# Patient Record
Sex: FEMALE | NOT HISPANIC OR LATINO | Employment: FULL TIME | URBAN - METROPOLITAN AREA
[De-identification: names, ages, dates, MRNs, and addresses within clinical notes are randomized per-mention and may not be internally consistent; named-entity substitution may affect disease eponyms.]

---

## 2017-02-13 ENCOUNTER — HOSPITAL ENCOUNTER (EMERGENCY)
Facility: CLINIC | Age: 27
Discharge: HOME | End: 2017-02-13
Attending: EMERGENCY MEDICINE | Admitting: EMERGENCY MEDICINE
Payer: COMMERCIAL

## 2017-02-13 VITALS
TEMPERATURE: 98.2 F | DIASTOLIC BLOOD PRESSURE: 73 MMHG | HEART RATE: 82 BPM | OXYGEN SATURATION: 99 % | RESPIRATION RATE: 18 BRPM | SYSTOLIC BLOOD PRESSURE: 108 MMHG

## 2017-02-13 DIAGNOSIS — R10.13 EPIGASTRIC PAIN: ICD-10-CM

## 2017-02-13 DIAGNOSIS — R11.2 NON-INTRACTABLE VOMITING WITH NAUSEA, UNSPECIFIED VOMITING TYPE: ICD-10-CM

## 2017-02-13 LAB
ALBUMIN SERPL-MCNC: 3.6 G/DL (ref 3.4–5)
ALBUMIN UR-MCNC: NEGATIVE MG/DL
ALP SERPL-CCNC: 52 U/L (ref 40–150)
ALT SERPL W P-5'-P-CCNC: 26 U/L (ref 0–50)
ANION GAP SERPL CALCULATED.3IONS-SCNC: 10 MMOL/L (ref 3–14)
APPEARANCE UR: CLEAR
AST SERPL W P-5'-P-CCNC: 19 U/L (ref 0–45)
BASOPHILS # BLD AUTO: 0 10E9/L (ref 0–0.2)
BASOPHILS NFR BLD AUTO: 0.3 %
BILIRUB SERPL-MCNC: 0.4 MG/DL (ref 0.2–1.3)
BILIRUB UR QL STRIP: NEGATIVE
BUN SERPL-MCNC: 7 MG/DL (ref 7–30)
CALCIUM SERPL-MCNC: 8.5 MG/DL (ref 8.5–10.1)
CHLORIDE SERPL-SCNC: 107 MMOL/L (ref 94–109)
CO2 SERPL-SCNC: 25 MMOL/L (ref 20–32)
COLOR UR AUTO: ABNORMAL
CREAT SERPL-MCNC: 0.73 MG/DL (ref 0.52–1.04)
DIFFERENTIAL METHOD BLD: ABNORMAL
EOSINOPHIL # BLD AUTO: 0.1 10E9/L (ref 0–0.7)
EOSINOPHIL NFR BLD AUTO: 1.9 %
ERYTHROCYTE [DISTWIDTH] IN BLOOD BY AUTOMATED COUNT: 15.5 % (ref 10–15)
GFR SERPL CREATININE-BSD FRML MDRD: ABNORMAL ML/MIN/1.7M2
GLUCOSE SERPL-MCNC: 101 MG/DL (ref 70–99)
GLUCOSE UR STRIP-MCNC: NEGATIVE MG/DL
HCG UR QL: NEGATIVE
HCT VFR BLD AUTO: 39.8 % (ref 35–47)
HGB BLD-MCNC: 12.8 G/DL (ref 11.7–15.7)
HGB UR QL STRIP: NEGATIVE
IMM GRANULOCYTES # BLD: 0 10E9/L (ref 0–0.4)
IMM GRANULOCYTES NFR BLD: 0.2 %
KETONES UR STRIP-MCNC: NEGATIVE MG/DL
LEUKOCYTE ESTERASE UR QL STRIP: NEGATIVE
LIPASE SERPL-CCNC: 92 U/L (ref 73–393)
LYMPHOCYTES # BLD AUTO: 2.1 10E9/L (ref 0.8–5.3)
LYMPHOCYTES NFR BLD AUTO: 36.2 %
MCH RBC QN AUTO: 26.9 PG (ref 26.5–33)
MCHC RBC AUTO-ENTMCNC: 32.2 G/DL (ref 31.5–36.5)
MCV RBC AUTO: 84 FL (ref 78–100)
MONOCYTES # BLD AUTO: 0.4 10E9/L (ref 0–1.3)
MONOCYTES NFR BLD AUTO: 6.8 %
NEUTROPHILS # BLD AUTO: 3.2 10E9/L (ref 1.6–8.3)
NEUTROPHILS NFR BLD AUTO: 54.6 %
NITRATE UR QL: NEGATIVE
NRBC # BLD AUTO: 0 10*3/UL
NRBC BLD AUTO-RTO: 0 /100
PH UR STRIP: 7 PH (ref 5–7)
PLATELET # BLD AUTO: 296 10E9/L (ref 150–450)
POTASSIUM SERPL-SCNC: 3.3 MMOL/L (ref 3.4–5.3)
PROT SERPL-MCNC: 7.8 G/DL (ref 6.8–8.8)
RBC # BLD AUTO: 4.75 10E12/L (ref 3.8–5.2)
RBC #/AREA URNS AUTO: <1 /HPF (ref 0–2)
SODIUM SERPL-SCNC: 142 MMOL/L (ref 133–144)
SP GR UR STRIP: 1.01 (ref 1–1.03)
SQUAMOUS #/AREA URNS AUTO: 3 /HPF (ref 0–1)
URN SPEC COLLECT METH UR: ABNORMAL
UROBILINOGEN UR STRIP-MCNC: NORMAL MG/DL (ref 0–2)
WBC # BLD AUTO: 5.9 10E9/L (ref 4–11)
WBC #/AREA URNS AUTO: 1 /HPF (ref 0–2)

## 2017-02-13 PROCEDURE — 85025 COMPLETE CBC W/AUTO DIFF WBC: CPT | Performed by: EMERGENCY MEDICINE

## 2017-02-13 PROCEDURE — 25000132 ZZH RX MED GY IP 250 OP 250 PS 637: Performed by: EMERGENCY MEDICINE

## 2017-02-13 PROCEDURE — 25000128 H RX IP 250 OP 636: Performed by: EMERGENCY MEDICINE

## 2017-02-13 PROCEDURE — 96361 HYDRATE IV INFUSION ADD-ON: CPT

## 2017-02-13 PROCEDURE — 80053 COMPREHEN METABOLIC PANEL: CPT | Performed by: EMERGENCY MEDICINE

## 2017-02-13 PROCEDURE — 83690 ASSAY OF LIPASE: CPT | Performed by: EMERGENCY MEDICINE

## 2017-02-13 PROCEDURE — 81001 URINALYSIS AUTO W/SCOPE: CPT | Performed by: EMERGENCY MEDICINE

## 2017-02-13 PROCEDURE — 96374 THER/PROPH/DIAG INJ IV PUSH: CPT

## 2017-02-13 PROCEDURE — 81025 URINE PREGNANCY TEST: CPT | Performed by: EMERGENCY MEDICINE

## 2017-02-13 PROCEDURE — 99284 EMERGENCY DEPT VISIT MOD MDM: CPT | Mod: 25

## 2017-02-13 PROCEDURE — 25000125 ZZHC RX 250: Performed by: EMERGENCY MEDICINE

## 2017-02-13 RX ORDER — ONDANSETRON 2 MG/ML
4 INJECTION INTRAMUSCULAR; INTRAVENOUS ONCE
Status: COMPLETED | OUTPATIENT
Start: 2017-02-13 | End: 2017-02-13

## 2017-02-13 RX ORDER — ONDANSETRON 4 MG/1
4-8 TABLET, ORALLY DISINTEGRATING ORAL EVERY 8 HOURS PRN
Qty: 12 TABLET | Refills: 0 | Status: SHIPPED | OUTPATIENT
Start: 2017-02-13 | End: 2017-02-16

## 2017-02-13 RX ADMIN — ONDANSETRON 4 MG: 2 INJECTION INTRAMUSCULAR; INTRAVENOUS at 21:10

## 2017-02-13 RX ADMIN — LIDOCAINE HYDROCHLORIDE 30 ML: 20 SOLUTION ORAL; TOPICAL at 21:42

## 2017-02-13 RX ADMIN — SODIUM CHLORIDE 1000 ML: 9 INJECTION, SOLUTION INTRAVENOUS at 21:10

## 2017-02-13 ASSESSMENT — ENCOUNTER SYMPTOMS
ABDOMINAL PAIN: 1
DIARRHEA: 0
FEVER: 0
ROS GI COMMENTS: NEGATIVE FOR HEMATEMESIS.
NAUSEA: 1
VOMITING: 1

## 2017-02-13 NOTE — ED AVS SNAPSHOT
[facility] Emergency Department    [address]    [facility] [address]    Phone:  [phone]    Fax:  [phone]                                       Juana Madrigal   MRN: [id number]    Department:  [facility] Emergency Department   Date of Visit:  2/13/2017           After Visit Summary Signature Page     I have received my discharge instructions, and my questions have been answered. I have discussed any challenges I see with this plan with the nurse or doctor.    ..........................................................................................................................................  Patient/Patient Representative Signature      ..........................................................................................................................................  Patient Representative Print Name and Relationship to Patient    ..................................................               ................................................  Date                                            Time    ..........................................................................................................................................  Reviewed by Signature/Title    ...................................................              ..............................................  Date                                                            Time

## 2017-02-13 NOTE — ED AVS SNAPSHOT
[facility] Emergency Department    [address] [facility] [address]    Phone:  [phone]    Fax:  [phone]                                       Juana Madrigal   MRN: [id number]    Department:  [facility] Emergency Department   Date of Visit:  2/13/2017           Patient Information     Date Of Birth          1990        Your diagnoses for this visit were:     Non-intractable vomiting with nausea, unspecified vomiting type     Epigastric pain        You were seen by Archana Layton MD.      Follow-up Information     Follow up with [facility] Emergency Department.    Specialty:  EMERGENCY MEDICINE    Why:  If symptoms worsen    Contact information:    [address] [facility] [address] [phone]        Follow up with [facility].    Specialty:  Family Medicine    Why:  For follow-up    Contact information:    [address] [facility] [address] [phone]        Discharge Instructions         Discharge Instructions  Vomiting    You have been seen today for vomiting. This is usually caused by a virus, but some bacteria, parasites, medicines or other medical conditions can cause similar symptoms. At this time your doctor does not find that your vomiting is a sign of anything dangerous or life-threatening. However, sometimes the signs of serious illness do not show up right away. If you have new or worse symptoms, you may need to be seen again in the emergency department or by your primary doctor. Remember that serious problems like appendicitis can start as vomiting.     Return to the Emergency Department if:    You keep throwing up and you are not able to keep liquids down.     You feel you are getting dehydrated, such as being very thirsty, not urinating at least every 8-12 hours, or feeling faint or lightheaded.     You develop a new fever, or your fever continues for more  than 2 days.     You have belly pain that seems worse than cramps, is in one spot, or is getting worse over time.     You have blood in your vomit or stools.     You feel very weak.    You are not starting to improve within 24 hours of your visit here.     What can I do to help myself?    The most important thing to do is to drink clear liquids. If you have been vomiting a lot, it is best to have only small, frequent sips of liquids. Drinking too much at once may cause more vomiting. If you are vomiting often, you must replace minerals, sodium and potassium lost with your illness. Pedialyte  and sports drinks can help you replace these minerals. You can also drink clear liquids such as water, weak tea, apple juice, and 7-Up . Avoid acid liquids (orange), caffeine (coffee) or alcohol. Do not drink milk until you no longer have diarrhea.     After liquids are staying down, you may start eating mild foods. Soda crackers, toast, plain noodles, gelatin, applesauce and bananas are good first choices. Avoid foods that have acid, are spicy, fatty or have a lot of fiber (such as meats, coarse grains, vegetables). You may start eating these foods again in about 3 days when you are better.     Sometimes treatment includes prescription medicine to prevent nausea and vomiting. If your doctor prescribes these for you, take them as directed.     Don t take ibuprofen, or other nonsteroidal anti-inflammatory medicines without checking with your healthcare provider.   If you were given a prescription for medicine here today, be sure to read all of the information (including the package insert) that comes with your prescription.  This will include important information about the medicine, its side effects, and any warnings that you need to know about.  The pharmacist who fills the prescription can provide more information and answer questions you may have about the medicine.  If you have questions or concerns that the pharmacist  cannot address, please call or return to the Emergency Department.       Remember that you can always come back to the Emergency Department if you are not able to see your regular doctor in the amount of time listed above, if you get any new symptoms, or if there is anything that worries you.          24 Hour Appointment Hotline       To make an appointment at any [facility], call [phone] ([phone]). If you don't have a family doctor or clinic, we will help you find one. [facility] are conveniently located to serve the needs of you and your family.             Review of your medicines      START taking        Dose / Directions Last dose taken    omeprazole 20 MG CR capsule   Commonly known as:  priLOSEC   Dose:  20 mg   Quantity:  30 capsule        Take 1 capsule (20 mg) by mouth daily   Refills:  0        ondansetron 4 MG ODT tab   Commonly known as:  ZOFRAN ODT   Dose:  4-8 mg   Quantity:  12 tablet        Take 1-2 tablets (4-8 mg) by mouth every 8 hours as needed for nausea   Refills:  0                Prescriptions were sent or printed at these locations (2 Prescriptions)                   Other Prescriptions                Printed at Department/Unit printer (2 of 2)         ondansetron (ZOFRAN ODT) 4 MG ODT tab               omeprazole (PRILOSEC) 20 MG CR capsule                Procedures and tests performed during your visit     CBC with platelets differential    Comprehensive metabolic panel    HCG qualitative urine    Lipase    UA with Microscopic      Orders Needing Specimen Collection     None      Pending Results     No orders found from 2/11/2017 to 2/14/2017.            Pending Culture Results     No orders found from 2/11/2017 to 2/14/2017.             Test Results from your hospital stay     2/13/2017  9:09 PM - Interface, [place] Results      Component Results     Component Value Ref Range & Units Status    WBC 5.9 4.0 - 11.0 10e9/L Final    RBC Count 4.75 3.8 - 5.2 10e12/L  Final    Hemoglobin 12.8 11.7 - 15.7 g/dL Final    Hematocrit 39.8 35.0 - 47.0 % Final    MCV 84 78 - 100 fl Final    MCH 26.9 26.5 - 33.0 pg Final    MCHC 32.2 31.5 - 36.5 g/dL Final    RDW 15.5 (H) 10.0 - 15.0 % Final    Platelet Count 296 150 - 450 10e9/L Final    Diff Method Automated Method  Final    % Neutrophils 54.6 % Final    % Lymphocytes 36.2 % Final    % Monocytes 6.8 % Final    % Eosinophils 1.9 % Final    % Basophils 0.3 % Final    % Immature Granulocytes 0.2 % Final    Nucleated RBCs 0 0 /100 Final    Absolute Neutrophil 3.2 1.6 - 8.3 10e9/L Final    Absolute Lymphocytes 2.1 0.8 - 5.3 10e9/L Final    Absolute Monocytes 0.4 0.0 - 1.3 10e9/L Final    Absolute Eosinophils 0.1 0.0 - 0.7 10e9/L Final    Absolute Basophils 0.0 0.0 - 0.2 10e9/L Final    Abs Immature Granulocytes 0.0 0 - 0.4 10e9/L Final    Absolute Nucleated RBC 0.0  Final         2/13/2017  9:24 PM - Interface, [facility] Results      Component Results     Component Value Ref Range & Units Status    Sodium 142 133 - 144 mmol/L Final    Potassium 3.3 (L) 3.4 - 5.3 mmol/L Final    Chloride 107 94 - 109 mmol/L Final    Carbon Dioxide 25 20 - 32 mmol/L Final    Anion Gap 10 3 - 14 mmol/L Final    Glucose 101 (H) 70 - 99 mg/dL Final    Urea Nitrogen 7 7 - 30 mg/dL Final    Creatinine 0.73 0.52 - 1.04 mg/dL Final    GFR Estimate >90  Non  GFR Calc   >60 mL/min/1.7m2 Final    GFR Estimate If Black >90   GFR Calc   >60 mL/min/1.7m2 Final    Calcium 8.5 8.5 - 10.1 mg/dL Final    Bilirubin Total 0.4 0.2 - 1.3 mg/dL Final    Albumin 3.6 3.4 - 5.0 g/dL Final    Protein Total 7.8 6.8 - 8.8 g/dL Final    Alkaline Phosphatase 52 40 - 150 U/L Final    ALT 26 0 - 50 U/L Final    AST 19 0 - 45 U/L Final         2/13/2017  9:24 PM - Interface, [facility] Results      Component Results     Component Value Ref Range & Units Status    Lipase 92 73 - 393 U/L Final         2/13/2017  9:29 PM - Interface, [facility] Results      Component  Results     Component Value Ref Range & Units Status    Color Urine Light Yellow  Final    Appearance Urine Clear  Final    Glucose Urine Negative NEG mg/dL Final    Bilirubin Urine Negative NEG Final    Ketones Urine Negative NEG mg/dL Final    Specific Gravity Urine 1.007 1.003 - 1.035 Final    Blood Urine Negative NEG Final    pH Urine 7.0 5.0 - 7.0 pH Final    Protein Albumin Urine Negative NEG mg/dL Final    Urobilinogen mg/dL Normal 0.0 - 2.0 mg/dL Final    Nitrite Urine Negative NEG Final    Leukocyte Esterase Urine Negative NEG Final    Source Midstream Urine  Final    WBC Urine 1 0 - 2 /HPF Final    RBC Urine <1 0 - 2 /HPF Final    Squamous Epithelial /HPF Urine 3 (H) 0 - 1 /HPF Final         2/13/2017  9:27 PM - Interface, [facility] Results      Component Results     Component Value Ref Range & Units Status    HCG Qual Urine Negative NEG Final                Clinical Quality Measure: Blood Pressure Screening     Your blood pressure was checked while you were in the emergency department today. The last reading we obtained was  BP: 108/73 . Please read the guidelines below about what these numbers mean and what you should do about them.  If your systolic blood pressure (the top number) is less than 120 and your diastolic blood pressure (the bottom number) is less than 80, then your blood pressure is normal. There is nothing more that you need to do about it.  If your systolic blood pressure (the top number) is 120-139 or your diastolic blood pressure (the bottom number) is 80-89, your blood pressure may be higher than it should be. You should have your blood pressure rechecked within a year by a primary care provider.  If your systolic blood pressure (the top number) is 140 or greater or your diastolic blood pressure (the bottom number) is 90 or greater, you may have high blood pressure. High blood pressure is treatable, but if left untreated over time it can put you at risk for heart attack, stroke, or  "kidney failure. You should have your blood pressure rechecked by a primary care provider within the next 4 weeks.  If your provider in the emergency department today gave you specific instructions to follow-up with your doctor or provider even sooner than that, you should follow that instruction and not wait for up to 4 weeks for your follow-up visit.        Thank you for choosing [facility]       Thank you for choosing [facility] for your care. Our goal is always to provide you with excellent care. Hearing back from our patients is one way we can continue to improve our services. Please take a few minutes to complete the written survey that you may receive in the mail after you visit with us. Thank you!        [facility] Information     [email or web address] lets you send messages to your doctor, view your test results, renew your prescriptions, schedule appointments and more. To sign up, go to [email or web address] . Click on \"Log in\" on the left side of the screen, which will take you to the Welcome page. Then click on \"Sign up Now\" on the right side of the page.     You will be asked to enter the access code listed below, as well as some personal information. Please follow the directions to create your username and password.     Your access code is: [email or web address]  Expires: 2017 10:21 PM     Your access code will  in 90 days. If you need help or a new code, please call your [facility] clinic or [phone].        Care EveryWhere ID     This is your Care EveryWhere ID. This could be used by other organizations to access your [facility] medical records  [id number]        After Visit Summary       This is your record. Keep this with you and show to your community pharmacist(s) and doctor(s) at your next visit.                  "

## 2017-02-14 NOTE — ED NOTES
Patient presents to ER for abdominal pain on left lower side with nausea and vomited x 1. ABC's intact.

## 2017-02-14 NOTE — ED PROVIDER NOTES
History     Chief Complaint:  Abdominal Pain    HPI   Juana Madrigal is a 26 year old female who presents with moderate cramping lower abdominal pain, left greater than right. This pain began this morning along with fatigue, nausea, and non-bloody emesis. She has not had any fevers or diarrhea. The patient notes that she took Plan B last night and wonders if this could be the cause of her symptoms. She additionally reports that she has had worsening epigastric pain recently secondary to not taking her Omeprazole for the last 5 days due to running out. The patient has not taken any medications for her symptoms today. She has not been exposed to any sick contacts. No prior abdominal surgeries.     Allergies:  The patient has no known drug allergies.    Medications:    Omeprazole    Past Medical History:    Reflux    Past Surgical History:    History reviewed.  No significant past surgical history.     Family History:    History reviewed.  No significant family history.     Social History:  Relationship status: Single  The patient presents with her boyfriend.     Review of Systems   Constitutional: Negative for fever.   Gastrointestinal: Positive for abdominal pain, nausea and vomiting. Negative for diarrhea.        Negative for hematemesis.    All other systems reviewed and are negative.    Physical Exam   First Vitals:  BP: 108/73  Temp: 98.2  F (36.8  C)  Temp src: Oral  Pulse: 82  Resp: 18  SpO2: 99 % (02/13 2037-02/13 2234)   Physical Exam  Eyes:  Sclera white; Pupils are equal and round  ENT:    External ears and nares normal  CV:  Regular rate and rhythm, No murmur   Resp:  Breath sounds clear and equal bilaterally    Non-labored, no retractions or accessory muscle use  GI:  Abdomen is soft, non-tender, non-distended    No rebound tenderness or peritoneal features  MS:  Moves all extremities  Skin:  Warm and dry  Neuro: Speech is normal and fluent. No apparent deficit.        Emergency Department  Course   Laboratory:  CBC: WNL (WBC 5.9, HGB 12.8, )  CMP: Potassium 3.3 (L), Glucose 101 (H), o/w WNL (Creatinine 0.73)  Lipase: 92  HCG Qualitative urine: Negative  UA: Clear, light yellow urine: 3 Squamous Epithelial (A), o/w WNL    Interventions:  2110: Normal Saline, 1000 mL, IV  2110: Zofran, 4 mg, IV injection  2142: GI cocktail, 30 mL, PO     Emergency Department Course:  Nursing notes and vitals reviewed.  I performed an exam of the patient as documented above.  The above workup was undertaken.  2210: I rechecked the patient and discussed results.   Findings and plan explained to the Patient. Patient discharged home, status improved, with instructions regarding supportive care, medications, and reasons to return as well as the importance of close follow-up was reviewed.    Impression & Plan    Medical Decision Making:  Juana Madrigal is a 26 year old female here for evaluation of nausea and upper abdominal pain after stopping her omeprazole. Differential includes morning sickness, gastritis, peptic ulcer disease, hepatitis, pancreatitis, and biliary colic. She does not have any tenderness to suggest appendicitis or diverticulitis. After medications here, she was feeling much better. She is not pregnant. I considered whether nausea could be secondary to her use of Plan B which was discussed with her as well. Whether it is this or early gastroenteritis, they should both be self-limited conditions. Symptomatic medications are being prescribed and they can follow up in clinic or return if worsening.    Diagnosis:    ICD-10-CM   1. Non-intractable vomiting with nausea, unspecified vomiting type R11.2   2. Epigastric pain R10.13     Disposition:  Discharge to home with primary care follow up.     Discharge Medications:   Details   ondansetron (ZOFRAN ODT) 4 MG ODT tab Take 1-2 tablets (4-8 mg) by mouth every 8 hours as needed for nausea, Disp-12 tablet, R-0, Local Print      omeprazole  (PRILOSEC) 20 MG CR capsule Take 1 capsule (20 mg) by mouth daily, Disp-30 capsule, R-0, Local Print          I, Liliana Cueva, am serving as a scribe on 2/13/2017 at 8:19 PM to personally document services performed by Archana Layton MD, based on my observations and the provider's statements to me.    [facility] EMERGENCY DEPARTMENT     Archana Layton MD  02/14/17 9044

## 2017-02-14 NOTE — DISCHARGE INSTRUCTIONS
Discharge Instructions  Vomiting    You have been seen today for vomiting. This is usually caused by a virus, but some bacteria, parasites, medicines or other medical conditions can cause similar symptoms. At this time your doctor does not find that your vomiting is a sign of anything dangerous or life-threatening. However, sometimes the signs of serious illness do not show up right away. If you have new or worse symptoms, you may need to be seen again in the emergency department or by your primary doctor. Remember that serious problems like appendicitis can start as vomiting.     Return to the Emergency Department if:    You keep throwing up and you are not able to keep liquids down.     You feel you are getting dehydrated, such as being very thirsty, not urinating at least every 8-12 hours, or feeling faint or lightheaded.     You develop a new fever, or your fever continues for more than 2 days.     You have belly pain that seems worse than cramps, is in one spot, or is getting worse over time.     You have blood in your vomit or stools.     You feel very weak.    You are not starting to improve within 24 hours of your visit here.     What can I do to help myself?    The most important thing to do is to drink clear liquids. If you have been vomiting a lot, it is best to have only small, frequent sips of liquids. Drinking too much at once may cause more vomiting. If you are vomiting often, you must replace minerals, sodium and potassium lost with your illness. Pedialyte  and sports drinks can help you replace these minerals. You can also drink clear liquids such as water, weak tea, apple juice, and 7-Up . Avoid acid liquids (orange), caffeine (coffee) or alcohol. Do not drink milk until you no longer have diarrhea.     After liquids are staying down, you may start eating mild foods. Soda crackers, toast, plain noodles, gelatin, applesauce and bananas are good first choices. Avoid foods that have acid, are spicy,  fatty or have a lot of fiber (such as meats, coarse grains, vegetables). You may start eating these foods again in about 3 days when you are better.     Sometimes treatment includes prescription medicine to prevent nausea and vomiting. If your doctor prescribes these for you, take them as directed.     Don t take ibuprofen, or other nonsteroidal anti-inflammatory medicines without checking with your healthcare provider.   If you were given a prescription for medicine here today, be sure to read all of the information (including the package insert) that comes with your prescription.  This will include important information about the medicine, its side effects, and any warnings that you need to know about.  The pharmacist who fills the prescription can provide more information and answer questions you may have about the medicine.  If you have questions or concerns that the pharmacist cannot address, please call or return to the Emergency Department.       Remember that you can always come back to the Emergency Department if you are not able to see your regular doctor in the amount of time listed above, if you get any new symptoms, or if there is anything that worries you.

## 2017-02-23 ENCOUNTER — OFFICE VISIT (OUTPATIENT)
Dept: URGENT CARE | Facility: URGENT CARE | Age: 27
End: 2017-02-23
Payer: COMMERCIAL

## 2017-02-23 VITALS
SYSTOLIC BLOOD PRESSURE: 94 MMHG | WEIGHT: 205 LBS | DIASTOLIC BLOOD PRESSURE: 64 MMHG | HEART RATE: 76 BPM | TEMPERATURE: 97.7 F

## 2017-02-23 DIAGNOSIS — R30.0 DYSURIA: Primary | ICD-10-CM

## 2017-02-23 LAB
ALBUMIN UR-MCNC: NEGATIVE MG/DL
APPEARANCE UR: CLEAR
BACTERIA #/AREA URNS HPF: ABNORMAL /HPF
BETA HCG QUAL IFA URINE: NEGATIVE
BILIRUB UR QL STRIP: ABNORMAL
COLOR UR AUTO: YELLOW
GLUCOSE UR STRIP-MCNC: NEGATIVE MG/DL
HGB UR QL STRIP: ABNORMAL
KETONES UR STRIP-MCNC: NEGATIVE MG/DL
LEUKOCYTE ESTERASE UR QL STRIP: NEGATIVE
NITRATE UR QL: NEGATIVE
NON-SQ EPI CELLS #/AREA URNS LPF: ABNORMAL /LPF
PH UR STRIP: 5.5 PH (ref 5–7)
RBC #/AREA URNS AUTO: ABNORMAL /HPF (ref 0–2)
SP GR UR STRIP: >1.03 (ref 1–1.03)
URN SPEC COLLECT METH UR: ABNORMAL
UROBILINOGEN UR STRIP-ACNC: 0.2 EU/DL (ref 0.2–1)
WBC #/AREA URNS AUTO: ABNORMAL /HPF (ref 0–2)

## 2017-02-23 PROCEDURE — 99213 OFFICE O/P EST LOW 20 MIN: CPT | Performed by: NURSE PRACTITIONER

## 2017-02-23 PROCEDURE — 81001 URINALYSIS AUTO W/SCOPE: CPT | Performed by: NURSE PRACTITIONER

## 2017-02-23 PROCEDURE — 84703 CHORIONIC GONADOTROPIN ASSAY: CPT | Performed by: NURSE PRACTITIONER

## 2017-02-23 RX ORDER — NITROFURANTOIN 25; 75 MG/1; MG/1
100 CAPSULE ORAL 2 TIMES DAILY
Qty: 14 CAPSULE | Refills: 0 | Status: SHIPPED | OUTPATIENT
Start: 2017-02-23 | End: 2017-03-03

## 2017-02-23 RX ORDER — PHENAZOPYRIDINE HYDROCHLORIDE 100 MG/1
100 TABLET, FILM COATED ORAL 3 TIMES DAILY PRN
Qty: 6 TABLET | Refills: 0 | Status: SHIPPED | OUTPATIENT
Start: 2017-02-23 | End: 2017-03-03

## 2017-02-23 NOTE — MR AVS SNAPSHOT
After Visit Summary   2/23/2017    Juana Madrigal    MRN: [id number]           Patient Information     Date Of Birth          1990        Visit Information        Provider Department      2/23/2017 6:00 PM John Barros APRN CNP [facility]        Today's Diagnoses     Dysuria    -  1      Care Instructions      Dysuria with Uncertain Cause (Adult)    The urethra is the tube that allows urine to pass out of the body. In a woman, the urethra is the opening above the vagina. In men, the urethra is the opening on the tip of the penis. Dysuria is the feeling of pain or burning in the urethra when passing urine.  Dysuria can be caused by anything that irritates or inflames the urethra. This can be caused by an infection or chemical irritation. The most common cause of dysuria in adults is a bladder infection. This is diagnosed with a urine test. It requires treatment with an antibiotic.  Soaps, lotions, colognes, feminine hygiene products, and birth control jellies, creams, and foams can cause chemical irritation and dysuria. It will go away in 1 to 3 days after the last time you use these irritants.  Sexually transmitted disease (STD) from chlamydia or gonorrhea can cause dysuria. If your doctor suspects this, he or she may take a culture specimen. It will take about 3 days to get the results. Antibiotic treatment may be started before the culture test returns.  In women who have gone through menopause, dysuria can be a result of dryness in the lining of the urethra. This can be treated with hormones. Dysuria becomes long-term (chronic) when it lasts for weeks or months. You may need to see a specialist (urologist) to diagnose and treat chronic dysuria.  Home care  The following home care measures may help:    Avoid any chemicals or products that you suspect may be causing your symptoms.    If you were given a prescription medicine, take as directed and take  the entire amount.    If a culture was taken, do not have sex until you have been told that it is negative (no infection). Then follow your healthcare provider's advice to treat your condition.  If a culture was done and it is positive:    Both you and your sexual partner need to be treated, even if your partner has no symptoms.    Contact your healthcare provider or go to an urgent care clinic or the public health department to be examined and treated.    Do not have sex until both you and your partner have completed all antibiotic medicine and are told that you are no longer contagious.    Learn about  safe sex  practices and use these in the future. The safest sex is with a partner who has tested negative and only has sex with you. Condoms offer protection from spreading some STDs, including gonorrhea, chlamydia and HIV, but are not a guarantee.  Follow-up care  Follow up with your healthcare provider as advised by our staff. If a culture was taken, call as directed the result. If diagnosed with an STD, follow up with your provider or the public health department for complete STD screening, including HIV testing. For more information, contact the National STD Hotline at 841-937-8372.  When to seek medical advice  Call your healthcare provider right away if any of these occur:    No improvement after three days of treatment    Fever of 100.4 F (38 C) or higher, or as directed    Increasing back or abdominal pain    Inability to urinate because of pain    A new discharge from the urethra, vagina or penis    Painful sores on the penis    Rash or joint pain    Enlarged painful lymph nodes (lumps) in the groin    Testicle pain or swelling of the scrotum    4975-8805 [employer]. [address] [place] [address], [place] [address]. All rights reserved. This information is not intended as a substitute for professional medical care. Always follow your healthcare professional's instructions.              Follow-ups  "after your visit        Who to contact     If you have questions or need follow up information about today's clinic visit or your schedule please contact [facility] directly at [phone].  Normal or non-critical lab and imaging results will be communicated to you by MyChart, letter or phone within 4 business days after the clinic has received the results. If you do not hear from us within 7 days, please contact the clinic through MyChart or phone. If you have a critical or abnormal lab result, we will notify you by phone as soon as possible.  Submit refill requests through [employer] or call your pharmacy and they will forward the refill request to us. Please allow 3 business days for your refill to be completed.          Additional Information About Your Visit        [employer] Information     [employer] lets you send messages to your doctor, view your test results, renew your prescriptions, schedule appointments and more. To sign up, go to [email or web address] . Click on \"Log in\" on the left side of the screen, which will take you to the Welcome page. Then click on \"Sign up Now\" on the right side of the page.     You will be asked to enter the access code listed below, as well as some personal information. Please follow the directions to create your username and password.     Your access code is: [email or web address]  Expires: 2017 10:21 PM     Your access code will  in 90 days. If you need help or a new code, please call your [place] clinic or [phone].        Care EveryWhere ID     This is your Care EveryWhere ID. This could be used by other organizations to access your [place] medical records  [id number]        Your Vitals Were     Pulse Temperature Last Period             76 97.7  F (36.5  C) (Oral) 01/15/2017          Blood Pressure from Last 3 Encounters:   17 94/64   17 108/73    Weight from Last 3 Encounters:   17 205 lb (93 kg)              We " Performed the Following     Beta HCG qual IFA urine     UA with Microscopic reflex to Culture          Today's Medication Changes          These changes are accurate as of: 2/23/17  8:57 PM.  If you have any questions, ask your nurse or doctor.               Start taking these medicines.        Dose/Directions    nitrofurantoin (macrocrystal-monohydrate) 100 MG capsule   Commonly known as:  MACROBID   Used for:  Dysuria   Started by:  John Barros APRN CNP        Dose:  100 mg   Take 1 capsule (100 mg) by mouth 2 times daily for 7 days   Quantity:  14 capsule   Refills:  0       phenazopyridine 100 MG tablet   Commonly known as:  PYRIDIUM   Used for:  Dysuria   Started by:  John Barros APRN CNP        Dose:  100 mg   Take 1 tablet (100 mg) by mouth 3 times daily as needed for urinary tract discomfort   Quantity:  6 tablet   Refills:  0            Where to get your medicines      These medications were sent to [facility] [address] [place] [address] [address] [facility]  [address] [facility] [address]    Hours:  24-hours Phone:  [phone]     nitrofurantoin (macrocrystal-monohydrate) 100 MG capsule    phenazopyridine 100 MG tablet                Primary Care Provider    Fvl None       No address on file        Thank you!     Thank you for choosing [facility]  for your care. Our goal is always to provide you with excellent care. Hearing back from our patients is one way we can continue to improve our services. Please take a few minutes to complete the written survey that you may receive in the mail after your visit with us. Thank you!             Your Updated Medication List - Protect others around you: Learn how to safely use, store and throw away your medicines at www.disposemymeds.org.          This list is accurate as of: 2/23/17  8:57 PM.  Always use your most recent med list.                   Brand Name Dispense Instructions  for use    nitrofurantoin (macrocrystal-monohydrate) 100 MG capsule    MACROBID    14 capsule    Take 1 capsule (100 mg) by mouth 2 times daily for 7 days       omeprazole 20 MG CR capsule    priLOSEC    30 capsule    Take 1 capsule (20 mg) by mouth daily       phenazopyridine 100 MG tablet    PYRIDIUM    6 tablet    Take 1 tablet (100 mg) by mouth 3 times daily as needed for urinary tract discomfort

## 2017-02-24 NOTE — PATIENT INSTRUCTIONS
Dysuria with Uncertain Cause (Adult)    The urethra is the tube that allows urine to pass out of the body. In a woman, the urethra is the opening above the vagina. In men, the urethra is the opening on the tip of the penis. Dysuria is the feeling of pain or burning in the urethra when passing urine.  Dysuria can be caused by anything that irritates or inflames the urethra. This can be caused by an infection or chemical irritation. The most common cause of dysuria in adults is a bladder infection. This is diagnosed with a urine test. It requires treatment with an antibiotic.  Soaps, lotions, colognes, feminine hygiene products, and birth control jellies, creams, and foams can cause chemical irritation and dysuria. It will go away in 1 to 3 days after the last time you use these irritants.  Sexually transmitted disease (STD) from chlamydia or gonorrhea can cause dysuria. If your doctor suspects this, he or she may take a culture specimen. It will take about 3 days to get the results. Antibiotic treatment may be started before the culture test returns.  In women who have gone through menopause, dysuria can be a result of dryness in the lining of the urethra. This can be treated with hormones. Dysuria becomes long-term (chronic) when it lasts for weeks or months. You may need to see a specialist (urologist) to diagnose and treat chronic dysuria.  Home care  The following home care measures may help:    Avoid any chemicals or products that you suspect may be causing your symptoms.    If you were given a prescription medicine, take as directed and take the entire amount.    If a culture was taken, do not have sex until you have been told that it is negative (no infection). Then follow your healthcare provider's advice to treat your condition.  If a culture was done and it is positive:    Both you and your sexual partner need to be treated, even if your partner has no symptoms.    Contact your healthcare provider or go  to an urgent care clinic or the public health department to be examined and treated.    Do not have sex until both you and your partner have completed all antibiotic medicine and are told that you are no longer contagious.    Learn about  safe sex  practices and use these in the future. The safest sex is with a partner who has tested negative and only has sex with you. Condoms offer protection from spreading some STDs, including gonorrhea, chlamydia and HIV, but are not a guarantee.  Follow-up care  Follow up with your healthcare provider as advised by our staff. If a culture was taken, call as directed the result. If diagnosed with an STD, follow up with your provider or the public health department for complete STD screening, including HIV testing. For more information, contact the National STD Hotline at 139-994-3960.  When to seek medical advice  Call your healthcare provider right away if any of these occur:    No improvement after three days of treatment    Fever of 100.4 F (38 C) or higher, or as directed    Increasing back or abdominal pain    Inability to urinate because of pain    A new discharge from the urethra, vagina or penis    Painful sores on the penis    Rash or joint pain    Enlarged painful lymph nodes (lumps) in the groin    Testicle pain or swelling of the scrotum    2120-0827 [employer]. [address] [place] [address], [place] [address]. All rights reserved. This information is not intended as a substitute for professional medical care. Always follow your healthcare professional's instructions.

## 2017-02-24 NOTE — NURSING NOTE
Chief Complaint   Patient presents with     Generalized Body Aches     back pain and bodyache for almost one week.      Amenorrhea     missed menses - request pregnancy test.        Initial BP 94/64 (BP Location: Left arm, Patient Position: Chair, Cuff Size: Adult Regular)  Pulse 76  Temp 97.7  F (36.5  C) (Oral)  Wt 205 lb (93 kg)  LMP 01/15/2017 There is no height or weight on file to calculate BMI.  Medication Reconciliation: complete

## 2017-02-24 NOTE — PROGRESS NOTES
SUBJECTIVE: Juana Madrigal is a 26 year old female here with her  for eval of urinary frequency, urgency and dysuria x 2 days, without flank pain, fever, chills, or abnormal vaginal discharge or bleeding. Also had a cough that started today.     OBJECTIVE: Appears well, in no apparent distress.  Vital signs are normal.BP 94/64 (BP Location: Left arm, Patient Position: Chair, Cuff Size: Adult Regular)  Pulse 76  Temp 97.7  F (36.5  C) (Oral)  Wt 205 lb (93 kg)  LMP 01/15/2017 Heart RRR no murmurs Lungs are clear  The abdomen is soft without tenderness, guarding, mass, rebound or organomegaly. No CVA tenderness or inguinal adenopathy noted.       Color Urine Yellow    Final 02/23/2017  7:01 PM 65   Appearance Urine Clear    Final 02/23/2017  7:01 PM 65   Glucose Urine Negative  NEG mg/dL Final 02/23/2017  7:01 PM 65   Bilirubin Urine  (A) NEG  Final 02/23/2017  7:01 PM 65   Small   This is an unconfirmed screening test result. A positive result may be false.      Ketones Urine Negative  NEG mg/dL Final 02/23/2017  7:01 PM 65   Specific Gravity Urine >1.030  1.003 - 1.035  Final 02/23/2017  7:01 PM 65   pH Urine 5.5  5.0 - 7.0 pH Final 02/23/2017  7:01 PM 65   Protein Albumin Urine Negative  NEG mg/dL Final 02/23/2017  7:01 PM 65   Urobilinogen Urine 0.2  0.2 - 1.0 EU/dL Final 02/23/2017  7:01 PM 65   Nitrite Urine Negative  NEG  Final 02/23/2017  7:01 PM 65   Blood Urine Moderate (A) NEG  Final 02/23/2017  7:01 PM 65   Leukocyte Esterase Urine Negative  NEG  Final 02/23/2017  7:01 PM 65   Source Midstream Urine    Final 02/23/2017  7:01 PM 65   WBC Urine O - 2  0 - 2 /HPF Final 02/23/2017  7:01 PM 65   RBC Urine 2-5 (A) 0 - 2 /HPF Final 02/23/2017  7:01 PM 65   Squamous Epithelial /LPF Urine Few  FEW /LPF Final 02/23/2017  7:01 PM 65   Bacteria Urine Few (A) NEG /HPF Final 02/23/2017       Pregnancy test is negative     ASSESSMENT: Cough and evolving UTI       PLAN: Rx Pyridium Macrobid fluids.   UA  discussed with patient VSS afebrile benign abdominal exam lungs are clear.  Return  if these symptoms worsen or fail to improve as anticipated.    FYI:  became loud screaming demanding to be seen because he had been waiting too long. Asked nursing staff to advised patient if they needed to leave to go home and we would call with results.    Nursing staff advised provider patient very angry about the wait. Asked available nursing to assist with this visit.  Patient continued with same verbal accusations of not being given the proper care and that  nursing told him to leave the clinic. Nursing staff and this provider used very non confrontational communication and tried to redirect but patient continued with behavior. Appeared angry and very intimidating to the staff.     He was not happy about seeing a NP asked if he was going to see a doctor. He continued with belligerent loud speech thru out the exam despite this provider asking that he lower his voice. Nursing has given patient a number to call for his complaints.

## 2017-03-03 ENCOUNTER — HOSPITAL ENCOUNTER (EMERGENCY)
Facility: CLINIC | Age: 27
Discharge: HOME | End: 2017-03-03
Attending: NURSE PRACTITIONER | Admitting: NURSE PRACTITIONER
Payer: COMMERCIAL

## 2017-03-03 VITALS
OXYGEN SATURATION: 99 % | TEMPERATURE: 97.5 F | RESPIRATION RATE: 12 BRPM | BODY MASS INDEX: 26.41 KG/M2 | HEIGHT: 72 IN | SYSTOLIC BLOOD PRESSURE: 102 MMHG | HEART RATE: 79 BPM | WEIGHT: 195 LBS | DIASTOLIC BLOOD PRESSURE: 70 MMHG

## 2017-03-03 DIAGNOSIS — R10.13 EPIGASTRIC PAIN: ICD-10-CM

## 2017-03-03 LAB
ALBUMIN SERPL-MCNC: 3.6 G/DL (ref 3.4–5)
ALBUMIN UR-MCNC: NEGATIVE MG/DL
ALP SERPL-CCNC: 60 U/L (ref 40–150)
ALT SERPL W P-5'-P-CCNC: 31 U/L (ref 0–50)
ANION GAP SERPL CALCULATED.3IONS-SCNC: 9 MMOL/L (ref 3–14)
APPEARANCE UR: CLEAR
AST SERPL W P-5'-P-CCNC: 25 U/L (ref 0–45)
BASOPHILS # BLD AUTO: 0 10E9/L (ref 0–0.2)
BASOPHILS NFR BLD AUTO: 0.2 %
BILIRUB SERPL-MCNC: 0.5 MG/DL (ref 0.2–1.3)
BILIRUB UR QL STRIP: NEGATIVE
BUN SERPL-MCNC: 8 MG/DL (ref 7–30)
CALCIUM SERPL-MCNC: 8.4 MG/DL (ref 8.5–10.1)
CHLORIDE SERPL-SCNC: 105 MMOL/L (ref 94–109)
CO2 SERPL-SCNC: 27 MMOL/L (ref 20–32)
COLOR UR AUTO: YELLOW
CREAT SERPL-MCNC: 0.75 MG/DL (ref 0.52–1.04)
DIFFERENTIAL METHOD BLD: NORMAL
EOSINOPHIL # BLD AUTO: 0 10E9/L (ref 0–0.7)
EOSINOPHIL NFR BLD AUTO: 0.4 %
ERYTHROCYTE [DISTWIDTH] IN BLOOD BY AUTOMATED COUNT: 14.8 % (ref 10–15)
GFR SERPL CREATININE-BSD FRML MDRD: ABNORMAL ML/MIN/1.7M2
GLUCOSE SERPL-MCNC: 87 MG/DL (ref 70–99)
GLUCOSE UR STRIP-MCNC: NEGATIVE MG/DL
HCG UR QL: NEGATIVE
HCT VFR BLD AUTO: 37.1 % (ref 35–47)
HGB BLD-MCNC: 11.8 G/DL (ref 11.7–15.7)
HGB UR QL STRIP: NEGATIVE
IMM GRANULOCYTES # BLD: 0 10E9/L (ref 0–0.4)
IMM GRANULOCYTES NFR BLD: 0.2 %
KETONES UR STRIP-MCNC: NEGATIVE MG/DL
LEUKOCYTE ESTERASE UR QL STRIP: NEGATIVE
LIPASE SERPL-CCNC: 74 U/L (ref 73–393)
LYMPHOCYTES # BLD AUTO: 1.9 10E9/L (ref 0.8–5.3)
LYMPHOCYTES NFR BLD AUTO: 21.2 %
MCH RBC QN AUTO: 26.5 PG (ref 26.5–33)
MCHC RBC AUTO-ENTMCNC: 31.8 G/DL (ref 31.5–36.5)
MCV RBC AUTO: 83 FL (ref 78–100)
MONOCYTES # BLD AUTO: 0.5 10E9/L (ref 0–1.3)
MONOCYTES NFR BLD AUTO: 5.9 %
NEUTROPHILS # BLD AUTO: 6.4 10E9/L (ref 1.6–8.3)
NEUTROPHILS NFR BLD AUTO: 72.1 %
NITRATE UR QL: NEGATIVE
NRBC # BLD AUTO: 0 10*3/UL
NRBC BLD AUTO-RTO: 0 /100
PH UR STRIP: 7 PH (ref 5–7)
PLATELET # BLD AUTO: 289 10E9/L (ref 150–450)
POTASSIUM SERPL-SCNC: 3.5 MMOL/L (ref 3.4–5.3)
PROT SERPL-MCNC: 7.3 G/DL (ref 6.8–8.8)
RBC # BLD AUTO: 4.46 10E12/L (ref 3.8–5.2)
RBC #/AREA URNS AUTO: <1 /HPF (ref 0–2)
SODIUM SERPL-SCNC: 141 MMOL/L (ref 133–144)
SP GR UR STRIP: 1.01 (ref 1–1.03)
URN SPEC COLLECT METH UR: NORMAL
UROBILINOGEN UR STRIP-MCNC: 0 MG/DL (ref 0–2)
WBC # BLD AUTO: 9 10E9/L (ref 4–11)
WBC #/AREA URNS AUTO: 1 /HPF (ref 0–2)

## 2017-03-03 PROCEDURE — 96374 THER/PROPH/DIAG INJ IV PUSH: CPT

## 2017-03-03 PROCEDURE — 25000128 H RX IP 250 OP 636: Performed by: NURSE PRACTITIONER

## 2017-03-03 PROCEDURE — 81001 URINALYSIS AUTO W/SCOPE: CPT | Performed by: NURSE PRACTITIONER

## 2017-03-03 PROCEDURE — 85025 COMPLETE CBC W/AUTO DIFF WBC: CPT | Performed by: NURSE PRACTITIONER

## 2017-03-03 PROCEDURE — 99284 EMERGENCY DEPT VISIT MOD MDM: CPT | Mod: 25

## 2017-03-03 PROCEDURE — 96361 HYDRATE IV INFUSION ADD-ON: CPT

## 2017-03-03 PROCEDURE — 25000125 ZZHC RX 250: Performed by: NURSE PRACTITIONER

## 2017-03-03 PROCEDURE — 25000132 ZZH RX MED GY IP 250 OP 250 PS 637: Performed by: NURSE PRACTITIONER

## 2017-03-03 PROCEDURE — 80053 COMPREHEN METABOLIC PANEL: CPT | Performed by: NURSE PRACTITIONER

## 2017-03-03 PROCEDURE — 81025 URINE PREGNANCY TEST: CPT | Performed by: NURSE PRACTITIONER

## 2017-03-03 PROCEDURE — 83690 ASSAY OF LIPASE: CPT | Performed by: NURSE PRACTITIONER

## 2017-03-03 RX ORDER — SUCRALFATE ORAL 1 G/10ML
1 SUSPENSION ORAL 4 TIMES DAILY
Qty: 200 ML | Refills: 0 | Status: SHIPPED | OUTPATIENT
Start: 2017-03-03 | End: 2017-03-08

## 2017-03-03 RX ORDER — METOCLOPRAMIDE HYDROCHLORIDE 5 MG/ML
10 INJECTION INTRAMUSCULAR; INTRAVENOUS ONCE
Status: COMPLETED | OUTPATIENT
Start: 2017-03-03 | End: 2017-03-03

## 2017-03-03 RX ADMIN — SODIUM CHLORIDE 1000 ML: 9 INJECTION, SOLUTION INTRAVENOUS at 18:20

## 2017-03-03 RX ADMIN — METOCLOPRAMIDE 10 MG: 5 INJECTION, SOLUTION INTRAMUSCULAR; INTRAVENOUS at 18:20

## 2017-03-03 RX ADMIN — LIDOCAINE HYDROCHLORIDE 30 ML: 20 SOLUTION ORAL; TOPICAL at 18:52

## 2017-03-03 ASSESSMENT — ENCOUNTER SYMPTOMS
VOMITING: 1
DYSURIA: 0
DIARRHEA: 0
ABDOMINAL PAIN: 1

## 2017-03-03 NOTE — ED AVS SNAPSHOT
[facility] Emergency Department    [address]    [facility] [address]    Phone:  [phone]    Fax:  [phone]                                       Juana Madrigal   MRN: [id number]    Department:  [facility] Emergency Department   Date of Visit:  3/3/2017           After Visit Summary Signature Page     I have received my discharge instructions, and my questions have been answered. I have discussed any challenges I see with this plan with the nurse or doctor.    ..........................................................................................................................................  Patient/Patient Representative Signature      ..........................................................................................................................................  Patient Representative Print Name and Relationship to Patient    ..................................................               ................................................  Date                                            Time    ..........................................................................................................................................  Reviewed by Signature/Title    ...................................................              ..............................................  Date                                                            Time

## 2017-03-03 NOTE — ED AVS SNAPSHOT
[facility]    [address]    [facility] [address]    Phone:  [phone]    Fax:  [phone]                                       Juana Madrigal   MRN: [id number]    Department:  [facility]   Date of Visit:  3/3/2017           Patient Information     Date Of Birth          1990        Your diagnoses for this visit were:     Epigastric pain        You were seen by Davin Waldron APRN CNP.      Follow-up Information     Call [facility].    Why:  to schedule appointment for follow up        Follow up with [facility].    Specialty:  EMERGENCY MEDICINE    Why:  If symptoms worsen    Contact information:    [address] [facility] [address] [phone]        Discharge Instructions       Tylenol for discomfort use as directed.        Discharge References/Attachments     GASTRITIS (ADULT) (ENGLISH)    EPIGASTRIC PAIN (UNCERTAIN CAUSE) (ENGLISH)      Future Appointments        Provider Department Dept Phone Center    3/9/2017 2:20 PM GABE Godinez CNP [facility] [phone] [place]      24 Hour Appointment Hotline       To make an appointment at any [facility], call [phone]). If you don't have a family doctor or clinic, we will help you find one. [facility] are conveniently located to serve the needs of you and your family.             Review of your medicines      START taking        Dose / Directions Last dose taken    sucralfate 1 GM/10ML suspension   Commonly known as:  CARAFATE   Dose:  1 g   Quantity:  200 mL        Take 10 mLs (1 g) by mouth 4 times daily for 5 days   Refills:  0          CONTINUE these medicines which may have CHANGED, or have new prescriptions. If we are uncertain of the size of tablets/capsules you have at home, strength may be listed as something that might  have changed.        Dose / Directions Last dose taken    * omeprazole 20 MG CR capsule   Commonly known as:  priLOSEC   Dose:  20 mg   What changed:  Another medication with the same name was added. Make sure you understand how and when to take each.   Quantity:  30 capsule        Take 1 capsule (20 mg) by mouth daily   Refills:  0        * omeprazole 20 MG CR capsule   Commonly known as:  priLOSEC   Dose:  20 mg   What changed:  You were already taking a medication with the same name, and this prescription was added. Make sure you understand how and when to take each.   Quantity:  30 capsule        Take 1 capsule (20 mg) by mouth daily   Refills:  0        * Notice:  This list has 2 medication(s) that are the same as other medications prescribed for you. Read the directions carefully, and ask your doctor or other care provider to review them with you.            Prescriptions were sent or printed at these locations (2 Prescriptions)                   Other Prescriptions                Printed at Department/Unit printer (2 of 2)         sucralfate (CARAFATE) 1 GM/10ML suspension               omeprazole (PRILOSEC) 20 MG CR capsule                Procedures and tests performed during your visit     CBC with platelets differential    Comprehensive metabolic panel    HCG qualitative urine    Lipase    UA reflex to Microscopic      Orders Needing Specimen Collection     None      Pending Results     No orders found from 3/1/2017 to 3/4/2017.            Pending Culture Results     No orders found from 3/1/2017 to 3/4/2017.             Test Results from your hospital stay     3/3/2017  6:53 PM - Interface, Infochimps Results      Component Results     Component Value Ref Range & Units Status    Sodium 141 133 - 144 mmol/L Final    Potassium 3.5 3.4 - 5.3 mmol/L Final    Chloride 105 94 - 109 mmol/L Final    Carbon Dioxide 27 20 - 32 mmol/L Final    Anion Gap 9 3 - 14 mmol/L Final    Glucose 87 70 - 99 mg/dL Final    Urea  Nitrogen 8 7 - 30 mg/dL Final    Creatinine 0.75 0.52 - 1.04 mg/dL Final    GFR Estimate >90  Non  GFR Calc   >60 mL/min/1.7m2 Final    GFR Estimate If Black >90   GFR Calc   >60 mL/min/1.7m2 Final    Calcium 8.4 (L) 8.5 - 10.1 mg/dL Final    Bilirubin Total 0.5 0.2 - 1.3 mg/dL Final    Albumin 3.6 3.4 - 5.0 g/dL Final    Protein Total 7.3 6.8 - 8.8 g/dL Final    Alkaline Phosphatase 60 40 - 150 U/L Final    ALT 31 0 - 50 U/L Final    AST 25 0 - 45 U/L Final         3/3/2017  6:53 PM - Interface, Flexilab Results      Component Results     Component Value Ref Range & Units Status    Lipase 74 73 - 393 U/L Final         3/3/2017  6:40 PM - Interface, Flexilab Results      Component Results     Component Value Ref Range & Units Status    HCG Qual Urine Negative NEG Final         3/3/2017  6:36 PM - Interface, Flexilab Results      Component Results     Component Value Ref Range & Units Status    WBC 9.0 4.0 - 11.0 10e9/L Final    RBC Count 4.46 3.8 - 5.2 10e12/L Final    Hemoglobin 11.8 11.7 - 15.7 g/dL Final    Hematocrit 37.1 35.0 - 47.0 % Final    MCV 83 78 - 100 fl Final    MCH 26.5 26.5 - 33.0 pg Final    MCHC 31.8 31.5 - 36.5 g/dL Final    RDW 14.8 10.0 - 15.0 % Final    Platelet Count 289 150 - 450 10e9/L Final    Diff Method Automated Method  Final    % Neutrophils 72.1 % Final    % Lymphocytes 21.2 % Final    % Monocytes 5.9 % Final    % Eosinophils 0.4 % Final    % Basophils 0.2 % Final    % Immature Granulocytes 0.2 % Final    Nucleated RBCs 0 0 /100 Final    Absolute Neutrophil 6.4 1.6 - 8.3 10e9/L Final    Absolute Lymphocytes 1.9 0.8 - 5.3 10e9/L Final    Absolute Monocytes 0.5 0.0 - 1.3 10e9/L Final    Absolute Eosinophils 0.0 0.0 - 0.7 10e9/L Final    Absolute Basophils 0.0 0.0 - 0.2 10e9/L Final    Abs Immature Granulocytes 0.0 0 - 0.4 10e9/L Final    Absolute Nucleated RBC 0.0  Final         3/3/2017  6:43 PM - Interface, Flexilab Results      Component Results      Component Value Ref Range & Units Status    Color Urine Yellow  Final    Appearance Urine Clear  Final    Glucose Urine Negative NEG mg/dL Final    Bilirubin Urine Negative NEG Final    Ketones Urine Negative NEG mg/dL Final    Specific Gravity Urine 1.009 1.003 - 1.035 Final    Blood Urine Negative NEG Final    pH Urine 7.0 5.0 - 7.0 pH Final    Protein Albumin Urine Negative NEG mg/dL Final    Urobilinogen mg/dL 0.0 0.0 - 2.0 mg/dL Final    Nitrite Urine Negative NEG Final    Leukocyte Esterase Urine Negative NEG Final    Source Midstream Urine  Final    RBC Urine <1 0 - 2 /HPF Final    WBC Urine 1 0 - 2 /HPF Final                Clinical Quality Measure: Blood Pressure Screening     Your blood pressure was checked while you were in the emergency department today. The last reading we obtained was  BP: (!) 145/102 . Please read the guidelines below about what these numbers mean and what you should do about them.  If your systolic blood pressure (the top number) is less than 120 and your diastolic blood pressure (the bottom number) is less than 80, then your blood pressure is normal. There is nothing more that you need to do about it.  If your systolic blood pressure (the top number) is 120-139 or your diastolic blood pressure (the bottom number) is 80-89, your blood pressure may be higher than it should be. You should have your blood pressure rechecked within a year by a primary care provider.  If your systolic blood pressure (the top number) is 140 or greater or your diastolic blood pressure (the bottom number) is 90 or greater, you may have high blood pressure. High blood pressure is treatable, but if left untreated over time it can put you at risk for heart attack, stroke, or kidney failure. You should have your blood pressure rechecked by a primary care provider within the next 4 weeks.  If your provider in the emergency department today gave you specific instructions to follow-up with your doctor or provider  "even sooner than that, you should follow that instruction and not wait for up to 4 weeks for your follow-up visit.        Thank you for choosing [facility]       Thank you for choosing [facility] for your care. Our goal is always to provide you with excellent care. Hearing back from our patients is one way we can continue to improve our services. Please take a few minutes to complete the written survey that you may receive in the mail after you visit with us. Thank you!        [employer] Information     [employer] lets you send messages to your doctor, view your test results, renew your prescriptions, schedule appointments and more. To sign up, go to [email or web address] . Click on \"Log in\" on the left side of the screen, which will take you to the Welcome page. Then click on \"Sign up Now\" on the right side of the page.     You will be asked to enter the access code listed below, as well as some personal information. Please follow the directions to create your username and password.     Your access code is: [email or web address]  Expires: 2017 10:21 PM     Your access code will  in 90 days. If you need help or a new code, please call your [facility] clinic or [phone].        Care EveryWhere ID     This is your Care EveryWhere ID. This could be used by other organizations to access your [facility] medical records  [id number]        After Visit Summary       This is your record. Keep this with you and show to your community pharmacist(s) and doctor(s) at your next visit.                  "

## 2017-03-04 NOTE — ED PROVIDER NOTES
History     Chief Complaint:  Vomiting and abdominal pain     HPI   Juana Madrigal is a 26 year old female who presents with abdominal pain and vomiting. The patient reports for the past week she has had ongoing generalized abdominal pain. The patient had new onset of vomiting this morning, which she describes as a yellow mucous, and has continued to have persistent pain. She was concerned for her symptoms which prompts her visit. The patient denies dysuria or other urinary symptoms. The patient denies diarrhea. The patient does have concerns she may be pregnant as one month ago she had taken Plan B and noticed her last menses was one day before resolving.    Allergies:  The patient has no known allergies to medications.      Medications:    Omeprazole    Past Medical History:    GERD    Past Surgical History:    The patient has no pertinent surgical history.     Family History:    The patient has no pertinent family history.     Social History:  Single.    Review of Systems   Gastrointestinal: Positive for abdominal pain and vomiting. Negative for diarrhea.   Genitourinary: Negative for dysuria.   All other systems reviewed and are negative.      Physical Exam   First Vitals:  BP: 117/71  Temp: 97.5  F (36.4  C)  Resp: 16  Height: 182.9 cm (6')  Weight: 88.5 kg (195 lb)    Physical Exam  General: Alert, No obvious discomfort, well kept  Eyes: PERRL, conjunctivae pink no scleral icterus or conjunctival injection  ENT:   Moist mucus membranes, posterior oropharynx clear without erythema or exudates, No lymphadenopathy, Normal voice  Resp:  Lungs clear to auscultation bilaterally, no crackles/rubs/wheezes. Good air movement  CV:  Normal rate and rhythm, no murmurs/rubs/gallops  GI:  Abdomen soft and non-distended.  Normoactive BS.  No tenderness, guarding or rebound, No masses  Skin:  Warm, dry.  No rashes or petechiae  Musculoskeletal: No peripheral edema or calf tenderness, Normal gross ROM   Neuro:  Alert and oriented to person/place/time, normal sensation  Psychiatric: Normal affect, cooperative, good eye contact    Emergency Department Course   Laboratory:  CMP: Calc 8.4 (L), WNL (Creat 0.75)   Lipase: 74  HCG qual urine: Negative   CBC: WNL (WBC 9.0, HGB 11.8, )   UA with Micro: WNL    Interventions:  0.9% sodium chloride infusion 1000 mL  Reglan 10 mg IV  GI cocktail 30 mL    Emergency Department Course:  Nursing notes and vitals reviewed.  I performed an exam of the patient as documented above.     Blood drawn and urine collected. This was sent to the lab for further testing, results above.     The patient received the intervention(s) above.       Impression & Plan    Medical Decision Making:  Juana Madrigal is a 26 year old female who presents with acute nausea and an episode of vomiting. She has a benign abdominal exam without focal RLQ or LLQ tenderness to suggest diverticulitis or appendicitis, respectively, or other acute abdominal process. I did discuss the sometimes vague initial constellation of symptoms early in the course of acute abdominal processes, and the need for immediate return should pain or other concerning symptoms develop.  Symptoms are improved after IV fluids and symptomatic treatment.  The patient is not pregnant.  There is no evidence of urinary tract infection. There has been no travel or antibiotic exposure to suggest more concerning cause of diarrhea, and there has been no blood in vomit or stool.  Vital signs have remained normal and stable throughout the ED course, and the abdominal re-exam remains benign. Tolerating PO intake.  I believe she is safe for discharge in improved condition at this time with strict return precautions for recurrent vomiting, pain, fever or any other concerning symptoms.      Diagnosis:    ICD-10-CM    1. Epigastric pain R10.13        Disposition:  Discharged.    Discharge Medications:  New Prescriptions    OMEPRAZOLE (PRILOSEC) 20  MG CR CAPSULE    Take 1 capsule (20 mg) by mouth daily    SUCRALFATE (CARAFATE) 1 GM/10ML SUSPENSION    Take 10 mLs (1 g) by mouth 4 times daily for 5 days         Dewey MCLEOD, am serving as a scribe at 7:58 PM on 3/3/2017 to document services personally performed by Davin Waldron N.P., based on my observations and the provider's statements to me.    [facility] EMERGENCY DEPARTMENT       Davin Waldron, GABE CNP  03/03/17 2609

## 2017-04-17 ENCOUNTER — HOSPITAL ENCOUNTER (EMERGENCY)
Facility: CLINIC | Age: 27
Discharge: HOME | End: 2017-04-17
Attending: EMERGENCY MEDICINE | Admitting: EMERGENCY MEDICINE
Payer: COMMERCIAL

## 2017-04-17 ENCOUNTER — APPOINTMENT (OUTPATIENT)
Dept: ULTRASOUND IMAGING | Facility: CLINIC | Age: 27
End: 2017-04-17
Attending: EMERGENCY MEDICINE
Payer: COMMERCIAL

## 2017-04-17 ENCOUNTER — APPOINTMENT (OUTPATIENT)
Dept: GENERAL RADIOLOGY | Facility: CLINIC | Age: 27
End: 2017-04-17
Attending: EMERGENCY MEDICINE
Payer: COMMERCIAL

## 2017-04-17 VITALS
TEMPERATURE: 97.9 F | DIASTOLIC BLOOD PRESSURE: 62 MMHG | RESPIRATION RATE: 16 BRPM | SYSTOLIC BLOOD PRESSURE: 105 MMHG | HEART RATE: 85 BPM | OXYGEN SATURATION: 100 %

## 2017-04-17 DIAGNOSIS — M25.562 ACUTE PAIN OF LEFT KNEE: ICD-10-CM

## 2017-04-17 DIAGNOSIS — R19.7 DIARRHEA, UNSPECIFIED TYPE: ICD-10-CM

## 2017-04-17 DIAGNOSIS — R10.9 ABDOMINAL PAIN, UNSPECIFIED LOCATION: ICD-10-CM

## 2017-04-17 LAB
ALBUMIN UR-MCNC: NEGATIVE MG/DL
APPEARANCE UR: ABNORMAL
BACTERIA #/AREA URNS HPF: ABNORMAL /HPF
BILIRUB UR QL STRIP: NEGATIVE
COLOR UR AUTO: YELLOW
D DIMER PPP FEU-MCNC: 0.6 UG/ML FEU (ref 0–0.5)
GLUCOSE UR STRIP-MCNC: NEGATIVE MG/DL
HCG SERPL QL: NEGATIVE
HGB UR QL STRIP: NEGATIVE
KETONES UR STRIP-MCNC: NEGATIVE MG/DL
LEUKOCYTE ESTERASE UR QL STRIP: ABNORMAL
MUCOUS THREADS #/AREA URNS LPF: PRESENT /LPF
NITRATE UR QL: NEGATIVE
PH UR STRIP: 6 PH (ref 5–7)
RBC #/AREA URNS AUTO: 4 /HPF (ref 0–2)
SP GR UR STRIP: 1.01 (ref 1–1.03)
SQUAMOUS #/AREA URNS AUTO: 13 /HPF (ref 0–1)
URN SPEC COLLECT METH UR: ABNORMAL
UROBILINOGEN UR STRIP-MCNC: 0 MG/DL (ref 0–2)
WBC #/AREA URNS AUTO: 3 /HPF (ref 0–2)

## 2017-04-17 PROCEDURE — 93971 EXTREMITY STUDY: CPT | Mod: LT

## 2017-04-17 PROCEDURE — 73560 X-RAY EXAM OF KNEE 1 OR 2: CPT | Mod: LT

## 2017-04-17 PROCEDURE — 85379 FIBRIN DEGRADATION QUANT: CPT | Performed by: EMERGENCY MEDICINE

## 2017-04-17 PROCEDURE — 84703 CHORIONIC GONADOTROPIN ASSAY: CPT | Performed by: EMERGENCY MEDICINE

## 2017-04-17 PROCEDURE — 99285 EMERGENCY DEPT VISIT HI MDM: CPT | Mod: 25

## 2017-04-17 PROCEDURE — 81001 URINALYSIS AUTO W/SCOPE: CPT | Performed by: EMERGENCY MEDICINE

## 2017-04-17 PROCEDURE — 25000132 ZZH RX MED GY IP 250 OP 250 PS 637: Performed by: EMERGENCY MEDICINE

## 2017-04-17 RX ORDER — IBUPROFEN 600 MG/1
TABLET, FILM COATED ORAL
Status: DISCONTINUED
Start: 2017-04-17 | End: 2017-04-18 | Stop reason: HOSPADM

## 2017-04-17 RX ORDER — ACETAMINOPHEN 500 MG
1000 TABLET ORAL ONCE
Status: COMPLETED | OUTPATIENT
Start: 2017-04-17 | End: 2017-04-17

## 2017-04-17 RX ADMIN — ACETAMINOPHEN 1000 MG: 500 TABLET, FILM COATED ORAL at 19:31

## 2017-04-17 ASSESSMENT — ENCOUNTER SYMPTOMS
ROS GI COMMENTS: NEGATIVE FOR HEMATEMESIS.
VOMITING: 1
ABDOMINAL PAIN: 1
NAUSEA: 1
DIARRHEA: 1
FEVER: 0

## 2017-04-18 NOTE — ED PROVIDER NOTES
History     Chief Complaint:  Knee Pain and Abdominal Pain    HPI   Juana Madrigal is a 26 year old female who presents with knee pain and abdominal pain. The patient states that she was walking last week when she had spontaneous onset of a left knee pain. There was no obvious mechanism causing her injury and the pain initially lasted two days. Her knee pain was predominately localized to the back of her knee at that time. Then last night Juana had recurrent left knee pain which was in the front of her knee as well as the back. Persistence of this knee pain ultimately prompted Juana to come to the ER for evaluation. Here in the ER the patient denies hip pain. Of note, she stands ten hours at a time for her job and she states that she needs to be able to stand and kneel to work.     Juana Madrigal is a 26 year old female who also presents to the ER complaining of abdominal pain. She reports frequency, diarrhea, nausea and vomiting; she has had two non bloody emetic episodes today. The patient denies fevers. Of note, Juana's boyfriend says that the patient may be pregnant.     Allergies:  The patient has no known drug allergies.    Medications:    The patient is currently on no regular medications.       Past Medical History:    History reviewed.  No significant past medical history.     Past Surgical History:    History reviewed.  No significant past surgical history.     Family History:    History reviewed.  No significant family history.     Social History:  Relationship status: single  Tobacco use: neg  Alcohol use: neg  The patient presents with her boyfriend.     Review of Systems   Constitutional: Negative for fever.   Gastrointestinal: Positive for abdominal pain, diarrhea, nausea and vomiting.        Negative for hematemesis.    Musculoskeletal:        Positive for knee pain.   Negative for hip pain.    All other systems reviewed and are negative.    Physical Exam   First Vitals:  BP:  105/62  Pulse: 85  Temp: 97.9  F (36.6  C)  Resp: 16  SpO2: 100 %      Physical Exam  General: Patient is alert and interactive when I enter the room   Head:  The scalp, face, and head appear normal  Eyes:  The pupils are equal, round, and reactive to light    Conjunctivae and sclerae are normal  ENT:    External acoustic canals are normal    The oropharynx is normal without erythema.     Uvula is in the midline  Neck:  Normal range of motion  CV:  Regular rate. S1/S2. No murmurs.   Resp:  Lungs are clear without wheezes or rales. No distress  GI:  Abdomen is soft, no rigidity, guarding, or rebound    No distension. No tenderness to palpation in any quadrant.     MS:  Normal tone. Joints grossly normal without effusions.     No asymmetric leg swelling, calf or thigh tenderness.  Tender left patella.     Normal motor assessment of all extremities.  Skin:  No rash or lesions noted. Normal capillary refill noted. Knee left is not swollen or red. Calf and thigh not tender to palpation bilaterally.   Neuro: Speech is normal and fluent. Face is symmetric.     Moving all extremities well.   Psych:  Awake. Alert.  Normal affect.  Appropriate interactions.  Lymph: No anterior cervical lymphadenopathy noted    Emergency Department Course     Imaging:  Radiographic findings were communicated with the patient who voiced understanding of the findings.  Left Knee XR, per radiology:   Negative exam.    Left Lower Extremity US, Venous Duplex, per radiology:   No evidence of deep venous thrombosis.  No Baker's cyst.    Laboratory:  UA: Slightly cloudy, yellow urine: leukocyte esterase small (A), bacteria few (A), squamous epithelial 13 (H), WBC 3 (H), RBC 4 (H), mucous present (A), o/w WNL  Urine Culture: Pending  D dimer: 0.6 (H)  HCG Qualitative 2014: Negative    Interventions:  1931: Tylenol, 1000 mg, PO    Emergency Department Course:  Nursing notes and vitals reviewed.  I performed an exam of the patient as documented  above.  The above workup was undertaken.  : I rechecked the patient and discussed results.  Findings and plan explained to the Patient and other:boyfriend. Patient discharged home with instructions regarding supportive care, medications, and reasons to return. The importance of close follow-up was reviewed. The patient was prescribed antiinflammatories.     Impression & Plan      Medical Decision Making:  Juana Madrigal is a 26 year old female who presents for evaluation of left knee pain.  She has a history of this in the past.  The evaluation shows normal knee ligament testing, slightly limited by patients pain. Palpation of the medial and lateral compartments and rotation of the knee does elicit tenderness.  Pain is reproduced with palpation of the patella, especially with lateral glide motions.  This coupled with history makes patellofemoral syndrome the most likely etiology. I doubt septic arthritis, cellulitis, acute ligamentous rupture, crystal joint disease, or other worrisome etiology.  She is stable for outpatient workup, scheduled ibuprofen, ice, and orthopedics clinic consultation. She has abd pain, some diarrhea. Not pregnant.  Soft benign abd.  Ultrasouind to be repeated in 1 week as positive d-dimer      Diagnosis:  Patellofemoral syndrome L  Abdominal pain  Diarrhea    Disposition:  Discharge to home with primary care follow up and orthopedics f/u.    Discharge Medications:  New Prescriptions    No medications on file     I, Quan Galarza, am serving as a scribe on 4/17/2017 at 7:27 PM to personally document services performed by Willy Witt MD, based on my observations and the provider's statements to me.    [facility] EMERGENCY DEPARTMENT       Willy Witt MD  04/18/17 [id number]

## 2017-04-18 NOTE — ED NOTES
Patient presents with left knee pain that started last week that went away then came back again last night, denies injury.  Patient also complains of abdominal pain with diarrhea this morning.  ABC's intact.

## 2017-06-05 ENCOUNTER — HOSPITAL ENCOUNTER (EMERGENCY)
Facility: CLINIC | Age: 27
Discharge: HOME | End: 2017-06-05
Attending: EMERGENCY MEDICINE | Admitting: EMERGENCY MEDICINE
Payer: COMMERCIAL

## 2017-06-05 VITALS
DIASTOLIC BLOOD PRESSURE: 73 MMHG | RESPIRATION RATE: 18 BRPM | OXYGEN SATURATION: 100 % | HEART RATE: 68 BPM | SYSTOLIC BLOOD PRESSURE: 103 MMHG | TEMPERATURE: 96.9 F

## 2017-06-05 DIAGNOSIS — L08.9 SKIN PUSTULE: ICD-10-CM

## 2017-06-05 DIAGNOSIS — R10.9 ABDOMINAL CRAMPING: ICD-10-CM

## 2017-06-05 LAB
ALBUMIN UR-MCNC: NEGATIVE MG/DL
APPEARANCE UR: ABNORMAL
BACTERIA #/AREA URNS HPF: ABNORMAL /HPF
BILIRUB UR QL STRIP: NEGATIVE
COLOR UR AUTO: YELLOW
GLUCOSE UR STRIP-MCNC: NEGATIVE MG/DL
HCG UR QL: NEGATIVE
HGB UR QL STRIP: NEGATIVE
KETONES UR STRIP-MCNC: NEGATIVE MG/DL
LEUKOCYTE ESTERASE UR QL STRIP: ABNORMAL
MUCOUS THREADS #/AREA URNS LPF: PRESENT /LPF
NITRATE UR QL: NEGATIVE
PH UR STRIP: 7 PH (ref 5–7)
RBC #/AREA URNS AUTO: 1 /HPF (ref 0–2)
SP GR UR STRIP: 1.02 (ref 1–1.03)
SQUAMOUS #/AREA URNS AUTO: 4 /HPF (ref 0–1)
URN SPEC COLLECT METH UR: ABNORMAL
UROBILINOGEN UR STRIP-MCNC: 0 MG/DL (ref 0–2)
WBC #/AREA URNS AUTO: 2 /HPF (ref 0–2)

## 2017-06-05 PROCEDURE — 81001 URINALYSIS AUTO W/SCOPE: CPT | Performed by: EMERGENCY MEDICINE

## 2017-06-05 PROCEDURE — 99283 EMERGENCY DEPT VISIT LOW MDM: CPT

## 2017-06-05 PROCEDURE — 81025 URINE PREGNANCY TEST: CPT | Performed by: EMERGENCY MEDICINE

## 2017-06-05 PROCEDURE — 25000132 ZZH RX MED GY IP 250 OP 250 PS 637: Performed by: EMERGENCY MEDICINE

## 2017-06-05 RX ORDER — OXYCODONE HYDROCHLORIDE 5 MG/1
10 TABLET ORAL ONCE
Status: COMPLETED | OUTPATIENT
Start: 2017-06-05 | End: 2017-06-05

## 2017-06-05 RX ORDER — OXYCODONE HYDROCHLORIDE 5 MG/1
5-10 TABLET ORAL EVERY 6 HOURS PRN
Qty: 10 TABLET | Refills: 0 | Status: SHIPPED | OUTPATIENT
Start: 2017-06-05 | End: 2017-06-30

## 2017-06-05 RX ADMIN — OXYCODONE HYDROCHLORIDE 10 MG: 5 TABLET ORAL at 05:07

## 2017-06-05 ASSESSMENT — ENCOUNTER SYMPTOMS
ABDOMINAL PAIN: 1
FEVER: 0
VOMITING: 0

## 2017-06-05 NOTE — ED AVS SNAPSHOT
[facility] Emergency Department    [address]    [facility] [address]    Phone:  [phone]    Fax:  [phone]                                       Juana Madrigal   MRN: [id number]    Department:  [facility] Emergency Department   Date of Visit:  6/5/2017           After Visit Summary Signature Page     I have received my discharge instructions, and my questions have been answered. I have discussed any challenges I see with this plan with the nurse or doctor.    ..........................................................................................................................................  Patient/Patient Representative Signature      ..........................................................................................................................................  Patient Representative Print Name and Relationship to Patient    ..................................................               ................................................  Date                                            Time    ..........................................................................................................................................  Reviewed by Signature/Title    ...................................................              ..............................................  Date                                                            Time

## 2017-06-05 NOTE — ED PROVIDER NOTES
History     Chief Complaint:  Abdominal pain    HPI   Juana Madrigal is a 27 year old female with a history of rheumatoid arthritis who presents to the emergency department today for evaluation of abdominal pain. The patient states that she took a plan B without a prescription from [employer] yesterday, with cramping abdominal pain throughout her whole abdomen. The first day of her last period was about two weeks ago, and she has been sexually active in the past month. She denies any fever or vomiting. She also reports a smal sore spot on her chest.    Allergies:  No Known Drug Allergies    Medications:    Methotrexate    Past Medical History:    Arthritis, rheumatoid  GERD    Past Surgical History:    History reviewed. No pertinent past surgical history.    Family History:    History reviewed. No pertinent family history.     Social History:  The patient was accompanied to the ED by her .  Smoking Status: Never Smoker  Smokeless Tobacco: Never used  Alcohol Use: No    Review of Systems   Constitutional: Negative for fever.   Gastrointestinal: Positive for abdominal pain. Negative for vomiting.   All other systems reviewed and are negative.    Physical Exam   Vitals:  Patient Vitals for the past 24 hrs:   BP Temp Temp src Pulse Resp SpO2   06/05/17 0545 103/73 - - 68 18 100 %   06/05/17 0530 108/78 - - - - -   06/05/17 0518 - - - - - 100 %   06/05/17 0517 - - - - - 100 %   06/05/17 0516 - - - - - 100 %   06/05/17 0515 99/73 - - - - -   06/05/17 0443 113/73 96.9  F (36.1  C) Temporal 66 16 100 %      Physical Exam  Nursing note and vitals reviewed.  Constitutional: Cooperative.   HENT:   Mouth/Throat: Mucous membranes are normal.   Cardiovascular: Normal rate, regular rhythm and normal heart sounds.  No murmur.  Pulmonary/Chest: Effort normal and breath sounds normal. No respiratory distress. No wheezes. No rales.   Abdominal: Soft. Normal appearance and bowel sounds are normal. No distension. There  is no tenderness to palpation. There is no rigidity and no guarding.   Neurological: Alert.   Skin: Skin is warm and dry. No rash noted.  Blackhead overlying a small pustular lesion over her sternum.  Psychiatric: Normal mood and affect.     Emergency Department Course     Laboratory:  Laboratory findings were communicated with the patient who voiced understanding of the findings.    HCG Qualitative Urine: Negative    UA with micro: Leukocyte Esterase Urine Trace (A), Bacteria Few (A), Squamous Epithelial/HPF Urine 4 (H), Mucous Urine Present (A) o/w negative    Interventions:  0507 oxycodone 10 mg oral     Emergency Department Course:  Nursing notes and vitals reviewed.  I performed an exam of the patient as documented above.   The patient provided a urine sample here in the emergency department. This was sent for laboratory testing, findings above.  At 0545 the patient was rechecked and was updated on the results of her laboratory studies.   I discussed the treatment plan with the patient. They expressed understanding of this plan and consented to discharge. They will be discharged home with instructions for care and follow up. In addition, the patient will return to the emergency department if their symptoms persist, worsen, if new symptoms arise or if there is any concern.  All questions were answered.  I personally reviewed the laboratory results with the Patient and answered all related questions prior to discharge.    Impression & Plan      Medical Decision Making:  Rohan is a 27 year old female who presents with two concerns. In regard to the lesion on her chest, this is a simple pustule with an overlying blackhead. No signs of cellulitis or other concerns. Indication will be watchful waiting. In regards to her abdominal cramping, this is likely due to her recent administration of plan B. I did perform a perform a pregnancy which was negative to confirm this was not an ectopic or other pregnancy related  complication. Urine analysis was otherwise unremarkable. She does have non focal abdominal exam with no guarding or reproducible tenderness. No indication for other laboratory evaluation or labs at this time. Plan of care will be supportive with pain medication as needed as I believe she's have simple cramping due to the administratio of Plan B. If this is worsening or changing in any way, she should return for repeat evaluation.    Diagnosis:    ICD-10-CM    1. Abdominal cramping R10.9     s/p Plan B administration   2. Skin pustule L08.9     chest     Disposition:   Home    Discharge Medications:  Discharge Medication List as of 6/5/2017  5:44 AM      START taking these medications    Details   oxyCODONE (ROXICODONE) 5 MG IR tablet Take 1-2 tablets (5-10 mg) by mouth every 6 hours as needed for pain, Disp-10 tablet, R-0, Local Print           Scribe Disclosure:  Norma MCLEOD, am serving as a scribe at 5:02 AM on 6/5/2017 to document services personally performed by Shashi Hogan MD, based on my observations and the provider's statements to me.    6/5/2017   [facility] EMERGENCY DEPARTMENT       Shashi Hogan MD  06/05/17 0905

## 2017-06-05 NOTE — ED AVS SNAPSHOT
[facility] Emergency Department    [address] [place] [address]    Phone:  [phone]    Fax:  [phone]                                       Juana Madrigal   MRN: [id number]    Department:  [facility] Emergency Department   Date of Visit:  6/5/2017           Patient Information     Date Of Birth          1990        Your diagnoses for this visit were:     Abdominal cramping s/p Plan B administration    Skin pustule chest       You were seen by Shashi Hogan MD.      Follow-up Information     Follow up with PCP as needed.      24 Hour Appointment Hotline       To make an appointment at any [facility] clinic, call [phone] ([phone]). If you don't have a family doctor or clinic, we will help you find one. [facility] clinics are conveniently located to serve the needs of you and your family.             Review of your medicines      START taking        Dose / Directions Last dose taken    oxyCODONE 5 MG IR tablet   Commonly known as:  ROXICODONE   Dose:  5-10 mg   Quantity:  10 tablet        Take 1-2 tablets (5-10 mg) by mouth every 6 hours as needed for pain   Refills:  0          Our records show that you are taking the medicines listed below. If these are incorrect, please call your family doctor or clinic.        Dose / Directions Last dose taken    METHOTREXATE PO        Refills:  0                Prescriptions were sent or printed at these locations (1 Prescription)                   Other Prescriptions                Printed at Department/Unit printer (1 of 1)         oxyCODONE (ROXICODONE) 5 MG IR tablet                Procedures and tests performed during your visit     HCG qualitative urine    UA with Microscopic      Orders Needing Specimen Collection     None      Pending Results     No orders found from 6/3/2017 to 6/6/2017.            Pending Culture Results     No orders found from 6/3/2017 to 6/6/2017.            Pending  Results Instructions     If you had any lab results that were not finalized at the time of your Discharge, you can call the ED Lab Result RN at [phone]. You will be contacted by this team for any positive Lab results or changes in treatment. The nurses are available 7 days a week from 10A to 6:30P.  You can leave a message 24 hours per day and they will return your call.        Test Results From Your Hospital Stay        6/5/2017  5:27 AM      Component Results     Component Value Ref Range & Units Status    HCG Qual Urine Negative NEG Final         6/5/2017  5:37 AM      Component Results     Component Value Ref Range & Units Status    Color Urine Yellow  Final    Appearance Urine Slightly Cloudy  Final    Glucose Urine Negative NEG mg/dL Final    Bilirubin Urine Negative NEG Final    Ketones Urine Negative NEG mg/dL Final    Specific Gravity Urine 1.018 1.003 - 1.035 Final    Blood Urine Negative NEG Final    pH Urine 7.0 5.0 - 7.0 pH Final    Protein Albumin Urine Negative NEG mg/dL Final    Urobilinogen mg/dL 0.0 0.0 - 2.0 mg/dL Final    Nitrite Urine Negative NEG Final    Leukocyte Esterase Urine Trace (A) NEG Final    Source Midstream Urine  Final    WBC Urine 2 0 - 2 /HPF Final    RBC Urine 1 0 - 2 /HPF Final    Bacteria Urine Few (A) NEG /HPF Final    Squamous Epithelial /HPF Urine 4 (H) 0 - 1 /HPF Final    Mucous Urine Present (A) NEG /LPF Final                Clinical Quality Measure: Blood Pressure Screening     Your blood pressure was checked while you were in the emergency department today. The last reading we obtained was  BP: 99/73 . Please read the guidelines below about what these numbers mean and what you should do about them.  If your systolic blood pressure (the top number) is less than 120 and your diastolic blood pressure (the bottom number) is less than 80, then your blood pressure is normal. There is nothing more that you need to do about it.  If your systolic blood pressure (the top  "number) is 120-139 or your diastolic blood pressure (the bottom number) is 80-89, your blood pressure may be higher than it should be. You should have your blood pressure rechecked within a year by a primary care provider.  If your systolic blood pressure (the top number) is 140 or greater or your diastolic blood pressure (the bottom number) is 90 or greater, you may have high blood pressure. High blood pressure is treatable, but if left untreated over time it can put you at risk for heart attack, stroke, or kidney failure. You should have your blood pressure rechecked by a primary care provider within the next 4 weeks.  If your provider in the emergency department today gave you specific instructions to follow-up with your doctor or provider even sooner than that, you should follow that instruction and not wait for up to 4 weeks for your follow-up visit.        Thank you for choosing [facility]       Thank you for choosing [facility] for your care. Our goal is always to provide you with excellent care. Hearing back from our patients is one way we can continue to improve our services. Please take a few minutes to complete the written survey that you may receive in the mail after you visit with us. Thank you!        [facility] Information     [employer] lets you send messages to your doctor, view your test results, renew your prescriptions, schedule appointments and more. To sign up, go to [email or web address] . Click on \"Log in\" on the left side of the screen, which will take you to the Welcome page. Then click on \"Sign up Now\" on the right side of the page.     You will be asked to enter the access code listed below, as well as some personal information. Please follow the directions to create your username and password.     Your access code is: [email or web address]  Expires: 9/3/2017  5:44 AM     Your access code will  in 90 days. If you need help or a new code, please call your [facility] clinic or [phone].        Care " EveryWhere ID     This is your Care EveryWhere ID. This could be used by other organizations to access your [facility] medical records  [id number]        After Visit Summary       This is your record. Keep this with you and show to your community pharmacist(s) and doctor(s) at your next visit.

## 2017-06-30 ENCOUNTER — APPOINTMENT (OUTPATIENT)
Dept: GENERAL RADIOLOGY | Facility: CLINIC | Age: 27
End: 2017-06-30
Attending: EMERGENCY MEDICINE
Payer: OTHER MISCELLANEOUS

## 2017-06-30 ENCOUNTER — HOSPITAL ENCOUNTER (EMERGENCY)
Facility: CLINIC | Age: 27
Discharge: HOME | End: 2017-06-30
Attending: EMERGENCY MEDICINE | Admitting: EMERGENCY MEDICINE
Payer: OTHER MISCELLANEOUS

## 2017-06-30 VITALS
HEART RATE: 63 BPM | DIASTOLIC BLOOD PRESSURE: 64 MMHG | RESPIRATION RATE: 18 BRPM | OXYGEN SATURATION: 99 % | SYSTOLIC BLOOD PRESSURE: 94 MMHG | TEMPERATURE: 98.4 F

## 2017-06-30 DIAGNOSIS — M25.562 ACUTE PAIN OF LEFT KNEE: ICD-10-CM

## 2017-06-30 DIAGNOSIS — M25.531 PAIN IN BOTH WRISTS: ICD-10-CM

## 2017-06-30 DIAGNOSIS — M25.551 HIP PAIN, RIGHT: ICD-10-CM

## 2017-06-30 DIAGNOSIS — M25.532 PAIN IN BOTH WRISTS: ICD-10-CM

## 2017-06-30 LAB
BASOPHILS # BLD AUTO: 0 10E9/L (ref 0–0.2)
BASOPHILS NFR BLD AUTO: 0.3 %
CRP SERPL-MCNC: 6.7 MG/L (ref 0–8)
DIFFERENTIAL METHOD BLD: ABNORMAL
EOSINOPHIL # BLD AUTO: 0 10E9/L (ref 0–0.7)
EOSINOPHIL NFR BLD AUTO: 0.5 %
ERYTHROCYTE [DISTWIDTH] IN BLOOD BY AUTOMATED COUNT: 15 % (ref 10–15)
ERYTHROCYTE [SEDIMENTATION RATE] IN BLOOD BY WESTERGREN METHOD: 30 MM/H (ref 0–20)
HCT VFR BLD AUTO: 35.1 % (ref 35–47)
HGB BLD-MCNC: 11.3 G/DL (ref 11.7–15.7)
IMM GRANULOCYTES # BLD: 0 10E9/L (ref 0–0.4)
IMM GRANULOCYTES NFR BLD: 0.3 %
LYMPHOCYTES # BLD AUTO: 1.1 10E9/L (ref 0.8–5.3)
LYMPHOCYTES NFR BLD AUTO: 17.1 %
MCH RBC QN AUTO: 26.5 PG (ref 26.5–33)
MCHC RBC AUTO-ENTMCNC: 32.2 G/DL (ref 31.5–36.5)
MCV RBC AUTO: 82 FL (ref 78–100)
MONOCYTES # BLD AUTO: 0.2 10E9/L (ref 0–1.3)
MONOCYTES NFR BLD AUTO: 3.7 %
NEUTROPHILS # BLD AUTO: 5.1 10E9/L (ref 1.6–8.3)
NEUTROPHILS NFR BLD AUTO: 78.1 %
NRBC # BLD AUTO: 0 10*3/UL
NRBC BLD AUTO-RTO: 0 /100
PLATELET # BLD AUTO: 290 10E9/L (ref 150–450)
RBC # BLD AUTO: 4.27 10E12/L (ref 3.8–5.2)
WBC # BLD AUTO: 6.5 10E9/L (ref 4–11)

## 2017-06-30 PROCEDURE — 36415 COLL VENOUS BLD VENIPUNCTURE: CPT | Performed by: EMERGENCY MEDICINE

## 2017-06-30 PROCEDURE — 99285 EMERGENCY DEPT VISIT HI MDM: CPT

## 2017-06-30 PROCEDURE — 86140 C-REACTIVE PROTEIN: CPT | Performed by: EMERGENCY MEDICINE

## 2017-06-30 PROCEDURE — 73562 X-RAY EXAM OF KNEE 3: CPT | Mod: LT

## 2017-06-30 PROCEDURE — 85025 COMPLETE CBC W/AUTO DIFF WBC: CPT | Performed by: EMERGENCY MEDICINE

## 2017-06-30 PROCEDURE — 85652 RBC SED RATE AUTOMATED: CPT | Performed by: EMERGENCY MEDICINE

## 2017-06-30 PROCEDURE — 73502 X-RAY EXAM HIP UNI 2-3 VIEWS: CPT

## 2017-06-30 PROCEDURE — 25000132 ZZH RX MED GY IP 250 OP 250 PS 637: Performed by: EMERGENCY MEDICINE

## 2017-06-30 RX ORDER — ACETAMINOPHEN 325 MG/1
650 TABLET ORAL ONCE
Status: COMPLETED | OUTPATIENT
Start: 2017-06-30 | End: 2017-06-30

## 2017-06-30 RX ORDER — IBUPROFEN 600 MG/1
600 TABLET, FILM COATED ORAL ONCE
Status: COMPLETED | OUTPATIENT
Start: 2017-06-30 | End: 2017-06-30

## 2017-06-30 RX ADMIN — ACETAMINOPHEN 650 MG: 325 TABLET, FILM COATED ORAL at 18:44

## 2017-06-30 RX ADMIN — IBUPROFEN 600 MG: 600 TABLET ORAL at 18:44

## 2017-06-30 ASSESSMENT — ENCOUNTER SYMPTOMS: ARTHRALGIAS: 1

## 2017-06-30 NOTE — ED AVS SNAPSHOT
[facility] Emergency Department    [address] [facility] [address]    Phone:  [phone]    Fax:  [phone]                                       Juana Madrigal   MRN: [id number]    Department:  [facility] Emergency Department   Date of Visit:  6/30/2017           Patient Information     Date Of Birth          1990        Your diagnoses for this visit were:     Hip pain, right     Acute pain of left knee     Pain in both wrists        You were seen by Fly Neff MD.      Follow-up Information     Follow up with [facility] Occupational Therapy.    Specialty:  Occupational Therapy    Contact information:    [address] [place] [address] [phone]    Additional information:    [facility] is located at [address] [place]        Discharge Instructions       Please make an appointment to follow up with [facility] [phone] OR Occupational Medicine in 2-3 days .          Hip Contusion    A contusion is another word for a bruise. It happens when small blood vessels break open and leak blood into the nearby area. A hip contusion can result from a bump, hit, or fall. Symptoms of a contusion often include changes in skin color (bruising), swelling, and pain. It may take several hours for a deep bruise to show up. If the injury is severe, you may need an x-ray to check for broken bones. Swelling should decrease in a few days. Bruising and pain may take several weeks to go away.  Home care    Unless another medicine was prescribed, you may take acetaminophen, ibuprofen, or naproxen to help relieve pain and swelling. If needed, stronger pain medicines may be prescribed. Take all medicines exactly as directed.    Ice the bruised area to help reduce pain and swelling. Wrap a cold source (ice pack or ice cubes in a plastic bag) in a thin towel. Apply the cold source to the  bruised area for 20 minutes every 1 to 2 hours the first day. Continue this 3 to 4 times a day until the pain and swelling goes away.    If walking causes pain, use crutches or a walker until you can walk without pain. These items can be rented at most pharmacies and orthopedic supply stores.    If your injury is keeping you from moving around or caring for yourself properly, you may qualify for services such as home health care. Check with your insurance company to see if this type of care is covered.  Follow-up  Follow up with your healthcare provider, or as advised.  When to seek medical advice   Call your healthcare provider right away if any of these occur:    Increased pain, bruising, or swelling near the injured area    Decreased ability to bear weight on the injured side    Pain or swelling develops below the knee    Chest pain or shortness of breath  Date Last Reviewed: 5/7/2015 2000-2017 [employer]. [address] [place] [address]. All rights reserved. This information is not intended as a substitute for professional medical care. Always follow your healthcare professional's instructions.          Arthralgia    Arthralgia is the term for pain in or around the joint. It is a symptom, not a disease. This pain may involve one or more joints. In some cases, the pain moves from joint to joint.  There are many causes for joint pain. These include:    Injury    Osteoarthritis (wearing out of the joint surface)    Gout (inflammation of the joint due to crystals in the joint fluid)    Infection inside the joint      Bursitis (inflammation of the fluid-filled sacs around the joint)    Autoimmune disorders such as rheumatoid arthritis or lupus    Tendonitis (inflamation of chords that attach muscle to bone)  Home care    Rest the involved joint(s) until your symptoms improve.     You may be prescribed pain medication. If none is prescribed, you may use acetaminophen or ibuprofen to control  pain and inflammation.  Follow up  Follow up with your healthcare provider or our staff as advised.  When to seek medical care  Contact your healthcare provider right away if any of the following occurs:    Pain, swelling, or redness of joint increases    Pain worsens or recurs after a period of improvement    Pain moves to other joints    You cannot bear weight on the affected joint     You cannot move the affected joint    Joint appears deformed    New rash appears    Fever of 101 F (38.8 C) or higher, or as directed by your healthcare provider  Date Last Reviewed: 4/26/2015 2000-2017 [employer]. [address] [place] [address]. All rights reserved. This information is not intended as a substitute for professional medical care. Always follow your healthcare professional's instructions.          24 Hour Appointment Hotline       To make an appointment at any [facility], call [phone]). If you don't have a family doctor or clinic, we will help you find one. [facility] clinics are conveniently located to serve the needs of you and your family.             Review of your medicines      Our records show that you are taking the medicines listed below. If these are incorrect, please call your family doctor or clinic.        Dose / Directions Last dose taken    METHOTREXATE PO        Take by mouth once a week   Refills:  0                Procedures and tests performed during your visit     CBC with platelets differential    CRP inflammation    Erythrocyte sedimentation rate auto    Knee XR, 3 views, left    XR Pelvis and Hip Right 2 Views      Orders Needing Specimen Collection     None      Pending Results     Date and Time Order Name Status Description    6/30/2017 1836 Knee XR, 3 views, left Preliminary     6/30/2017 1836 XR Pelvis and Hip Right 2 Views Preliminary             Pending Culture Results     No orders found from 6/28/2017 to 7/1/2017.            Pending  Results Instructions     If you had any lab results that were not finalized at the time of your Discharge, you can call the ED Lab Result RN at [phone]. You will be contacted by this team for any positive Lab results or changes in treatment. The nurses are available 7 days a week from 10A to 6:30P.  You can leave a message 24 hours per day and they will return your call.        Test Results From Your Hospital Stay        6/30/2017  7:36 PM      Component Results     Component Value Ref Range & Units Status    CRP Inflammation 6.7 0.0 - 8.0 mg/L Final         6/30/2017  7:33 PM      Component Results     Component Value Ref Range & Units Status    Sed Rate 30 (H) 0 - 20 mm/h Final         6/30/2017  7:19 PM      Component Results     Component Value Ref Range & Units Status    WBC 6.5 4.0 - 11.0 10e9/L Final    RBC Count 4.27 3.8 - 5.2 10e12/L Final    Hemoglobin 11.3 (L) 11.7 - 15.7 g/dL Final    Hematocrit 35.1 35.0 - 47.0 % Final    MCV 82 78 - 100 fl Final    MCH 26.5 26.5 - 33.0 pg Final    MCHC 32.2 31.5 - 36.5 g/dL Final    RDW 15.0 10.0 - 15.0 % Final    Platelet Count 290 150 - 450 10e9/L Final    Diff Method Automated Method  Final    % Neutrophils 78.1 % Final    % Lymphocytes 17.1 % Final    % Monocytes 3.7 % Final    % Eosinophils 0.5 % Final    % Basophils 0.3 % Final    % Immature Granulocytes 0.3 % Final    Nucleated RBCs 0 0 /100 Final    Absolute Neutrophil 5.1 1.6 - 8.3 10e9/L Final    Absolute Lymphocytes 1.1 0.8 - 5.3 10e9/L Final    Absolute Monocytes 0.2 0.0 - 1.3 10e9/L Final    Absolute Eosinophils 0.0 0.0 - 0.7 10e9/L Final    Absolute Basophils 0.0 0.0 - 0.2 10e9/L Final    Abs Immature Granulocytes 0.0 0 - 0.4 10e9/L Final    Absolute Nucleated RBC 0.0  Final         6/30/2017  7:22 PM      Narrative     XR PELVIS AND HIP RIGHT 2 VIEWS    6/30/2017 7:17 PM      HISTORY: pain    COMPARISON: None.    FINDINGS:  There is normal osseous alignment.  No fractures are  identified.  No  significant degenerative changes seen in either hip  joint. Oval density projected over the mid pelvis. This probably  represents an enteric tablets.        Impression     IMPRESSION: Osseous structures appear intact. No fractures are  identified.         6/30/2017  7:21 PM      Narrative     XR KNEE LT 3 VW    6/30/2017 7:17 PM      HISTORY: pain    COMPARISON: Film dated 4/17/2017    FINDINGS:  There is normal osseous alignment.  No fractures are  identified.        Impression     IMPRESSION: Osseous structures appear intact. No change.                Clinical Quality Measure: Blood Pressure Screening     Your blood pressure was checked while you were in the emergency department today. The last reading we obtained was  BP: 94/64 . Please read the guidelines below about what these numbers mean and what you should do about them.  If your systolic blood pressure (the top number) is less than 120 and your diastolic blood pressure (the bottom number) is less than 80, then your blood pressure is normal. There is nothing more that you need to do about it.  If your systolic blood pressure (the top number) is 120-139 or your diastolic blood pressure (the bottom number) is 80-89, your blood pressure may be higher than it should be. You should have your blood pressure rechecked within a year by a primary care provider.  If your systolic blood pressure (the top number) is 140 or greater or your diastolic blood pressure (the bottom number) is 90 or greater, you may have high blood pressure. High blood pressure is treatable, but if left untreated over time it can put you at risk for heart attack, stroke, or kidney failure. You should have your blood pressure rechecked by a primary care provider within the next 4 weeks.  If your provider in the emergency department today gave you specific instructions to follow-up with your doctor or provider even sooner than that, you should follow that instruction and not wait for up to 4  "weeks for your follow-up visit.        Thank you for choosing [facility]       Thank you for choosing [facility] for your care. Our goal is always to provide you with excellent care. Hearing back from our patients is one way we can continue to improve our services. Please take a few minutes to complete the written survey that you may receive in the mail after you visit with us. Thank you!        [email or web address] Information     [facility] lets you send messages to your doctor, view your test results, renew your prescriptions, schedule appointments and more. To sign up, go to [email or web address] . Click on \"Log in\" on the left side of the screen, which will take you to the Welcome page. Then click on \"Sign up Now\" on the right side of the page.     You will be asked to enter the access code listed below, as well as some personal information. Please follow the directions to create your username and password.     Your access code is: [email or web address]  Expires: 9/3/2017  5:44 AM     Your access code will  in 90 days. If you need help or a new code, please call your [facility] clinic or [phone].        Care EveryWhere ID     This is your Care EveryWhere ID. This could be used by other organizations to access your [facility] medical records  [id number]        Equal Access to Services     ENDY MARCIAL : Vern kyle Somorgan, waaxda luqadaha, qaybta kaalmada adeegyada, homero parra. So [facility] [phone].    ATENCIÓN: Si habla español, tiene a lawrence disposición servicios gratuitos de asistencia lingüística. Llame al [phone].    We comply with applicable federal civil rights laws and [place] laws. We do not discriminate on the basis of race, color, national origin, age, disability sex, sexual orientation or gender identity.            After Visit Summary       This is your record. Keep this with you and show to your community pharmacist(s) and doctor(s) at your next visit.                  "

## 2017-06-30 NOTE — ED NOTES
Right hip pain started in the middle of the week while bending over at work. No other known injury. Also c.o left ankle pain and left wrist pain that started this morning when pt woke up.

## 2017-06-30 NOTE — ED AVS SNAPSHOT
[facility]    [address]    [facility] [address]    Phone:  [phone]    Fax:  [phone]                                       Juana Madrigal   MRN: [id number]    Department:  [facility]   Date of Visit:  6/30/2017           After Visit Summary Signature Page     I have received my discharge instructions, and my questions have been answered. I have discussed any challenges I see with this plan with the nurse or doctor.    ..........................................................................................................................................  Patient/Patient Representative Signature      ..........................................................................................................................................  Patient Representative Print Name and Relationship to Patient    ..................................................               ................................................  Date                                            Time    ..........................................................................................................................................  Reviewed by Signature/Title    ...................................................              ..............................................  Date                                                            Time

## 2017-06-30 NOTE — ED PROVIDER NOTES
History     Chief Complaint:  Hip and Wrist Pain    HPI   Juana Madrigal is a 27 year old female who presents with right hip and wrist pain. The patient states that she had reached down to grab something approximately a week ago and felt a sharp pain in her right hip joint and has felt continuous pain along with intermittent right wrist pain and left knee pain, which prompted her ED visit. She states she has had no previous joint problems and states that her other joints have no pain. She states she had fever this morning, but has since resolved.     Allergies:  NKDA     Medications:    Methotrexate    Past Medical History:    GERD  rheumatoid arthritis     Past Surgical History:    The patient does not have any pertinent past surgical history.     Family History:    No past pertinent family history.     Social History:  Negative for alcohol use.  Negative for tobacco use.   Marital Status:  Single [1]     Review of Systems   Musculoskeletal: Positive for arthralgias.   All other systems reviewed and are negative.      Physical Exam   First Vitals:  BP: 105/71  Heart Rate: 72  Temp: 98.4  F (36.9  C)  SpO2: 99 %    Physical Exam   HENT:   Right Ear: External ear normal.   Left Ear: External ear normal.   Nose: Nose normal.   Eyes: Conjunctivae and lids are normal.   Neck: Neck supple. No tracheal deviation present.   Cardiovascular: Regular rhythm and intact distal pulses.    Pulmonary/Chest: No respiratory distress.   Abdominal: Soft. There is no tenderness. There is no rebound and no guarding.   Musculoskeletal:   + ttp bilateral wrist joints, no effusion, ROM intact, no palpable deformity, remainder BUE normal, distal cms intact    + pain R hip with int/ext rotation, distally cms intact and no ttp    + ttp anterior L knee and medial joint line, no edema, no effusion, hip and ankle/foot are normal       Neurological:   MAEE, no gross focal motor or sensory deficit   Skin: Skin is warm and dry. She is  not diaphoretic.   Psychiatric: She has a normal mood and affect.   Nursing note and vitals reviewed.        Emergency Department Course     Imaging:  Radiographic findings were communicated with the patient who voiced understanding of the findings.  XR Pelvis and Hip, Right 2 views:   Osseous structures appear intact. No fractures are  identified. As per radiology.     XR Knee, Left:   Osseous structures appear intact. No change. As per radiology.     Laboratory:  CBC: WBC: 6.5, HGB: 11.3(L), PLT: 290    Erythrocyte Sedimentation:  30(H)  CRP inflammation: 6.7    Interventions:   1844 Advil/Motrin, 600 mg, PO  1844 Tylenol, 650 mg, PO    Emergency Department Course:  Nursing notes and vitals reviewed. I performed an exam of the patient as documented above.     Medicine administered as documented above. Blood drawn. This was sent to the lab for further testing, results above.    The patient was sent for a Knee, Pelvis, and Hip XR while in the emergency department, findings above.     1937 I rechecked the patient and discussed the results of their workup thus far.     Findings and plan explained to the Patient. Patient discharged home with instructions regarding supportive care, medications, and reasons to return. The importance of close follow-up was reviewed.     I personally reviewed the laboratory results with the Patient and answered all related questions prior to discharge.       Impression & Plan      Medical Decision Making:  Juana Madrigal is a 27 year old female here with complaints of right hip pain, left knee pain, bilateral wrist pain. The hip is her primary complaint which she injured a week ago when bending over. She has pain with internal and external rotation on exam here and adduction. XR was negative with a low a suspicion for an alternative etiology other than a musculoskeletal etiology. The XR for left knee is also unremarkable. Basic blood test are unremarkable other than a mildly  elevated sed rate. She can follow up with a primary care physician, if she continues to have these polyarthralgias. The wrist pain is due to carpal tunnel syndrome and give her removable splints to wear at home during the night time.       Diagnosis:    ICD-10-CM    1. Hip pain, right M25.551 Erythrocyte sedimentation rate auto   2. Acute pain of left knee M25.562    3. Pain in both wrists M25.531     M25.532        Disposition:  discharged to home    IJovanna, am serving as a scribe on 6/30/2017 at 6:30 PM to personally document services performed by Fly Neff MD based on my observations and the provider's statements to me.      Jovanna Ponce  6/30/2017   [facility] EMERGENCY DEPARTMENT       Fly Neff MD  07/01/17 0118

## 2017-06-30 NOTE — LETTER
[facility] EMERGENCY DEPARTMENT  [address] [place] [address]  [phone]    2017    Juana Madrigal  [address]  [facility] [address] [phone] (home) NONE (work)    : 1990      To Whom it may concern:    Juana Madrigal was seen in our Emergency Department today, 2017.  I expect her condition to improve over the next 3 days.  She may return to work/school when improved.    Sincerely,        Fly Neff

## 2017-07-01 NOTE — DISCHARGE INSTRUCTIONS
Please make an appointment to follow up with [facility] [phone] OR Occupational Medicine in 2-3 days .          Hip Contusion    A contusion is another word for a bruise. It happens when small blood vessels break open and leak blood into the nearby area. A hip contusion can result from a bump, hit, or fall. Symptoms of a contusion often include changes in skin color (bruising), swelling, and pain. It may take several hours for a deep bruise to show up. If the injury is severe, you may need an x-ray to check for broken bones. Swelling should decrease in a few days. Bruising and pain may take several weeks to go away.  Home care    Unless another medicine was prescribed, you may take acetaminophen, ibuprofen, or naproxen to help relieve pain and swelling. If needed, stronger pain medicines may be prescribed. Take all medicines exactly as directed.    Ice the bruised area to help reduce pain and swelling. Wrap a cold source (ice pack or ice cubes in a plastic bag) in a thin towel. Apply the cold source to the bruised area for 20 minutes every 1 to 2 hours the first day. Continue this 3 to 4 times a day until the pain and swelling goes away.    If walking causes pain, use crutches or a walker until you can walk without pain. These items can be rented at most pharmacies and orthopedic supply stores.    If your injury is keeping you from moving around or caring for yourself properly, you may qualify for services such as home health care. Check with your insurance company to see if this type of care is covered.  Follow-up  Follow up with your healthcare provider, or as advised.  When to seek medical advice   Call your healthcare provider right away if any of these occur:    Increased pain, bruising, or swelling near the injured area    Decreased ability to bear weight on the injured side    Pain or swelling develops below the knee    Chest pain or shortness of breath  Date Last Reviewed: 5/7/2015     3490-0064 [employer]. [address] [place] [address]. All rights reserved. This information is not intended as a substitute for professional medical care. Always follow your healthcare professional's instructions.          Arthralgia    Arthralgia is the term for pain in or around the joint. It is a symptom, not a disease. This pain may involve one or more joints. In some cases, the pain moves from joint to joint.  There are many causes for joint pain. These include:    Injury    Osteoarthritis (wearing out of the joint surface)    Gout (inflammation of the joint due to crystals in the joint fluid)    Infection inside the joint      Bursitis (inflammation of the fluid-filled sacs around the joint)    Autoimmune disorders such as rheumatoid arthritis or lupus    Tendonitis (inflamation of chords that attach muscle to bone)  Home care    Rest the involved joint(s) until your symptoms improve.     You may be prescribed pain medication. If none is prescribed, you may use acetaminophen or ibuprofen to control pain and inflammation.  Follow up  Follow up with your healthcare provider or our staff as advised.  When to seek medical care  Contact your healthcare provider right away if any of the following occurs:    Pain, swelling, or redness of joint increases    Pain worsens or recurs after a period of improvement    Pain moves to other joints    You cannot bear weight on the affected joint     You cannot move the affected joint    Joint appears deformed    New rash appears    Fever of 101 F (38.8 C) or higher, or as directed by your healthcare provider  Date Last Reviewed: 4/26/2015 2000-2017 [employer]. [address] [place] [address]. All rights reserved. This information is not intended as a substitute for professional medical care. Always follow your healthcare professional's instructions.

## 2017-07-16 ENCOUNTER — HOSPITAL ENCOUNTER (EMERGENCY)
Facility: CLINIC | Age: 27
Discharge: HOME | End: 2017-07-16
Attending: EMERGENCY MEDICINE | Admitting: EMERGENCY MEDICINE
Payer: COMMERCIAL

## 2017-07-16 VITALS
SYSTOLIC BLOOD PRESSURE: 104 MMHG | HEART RATE: 78 BPM | TEMPERATURE: 99.6 F | RESPIRATION RATE: 18 BRPM | OXYGEN SATURATION: 99 % | DIASTOLIC BLOOD PRESSURE: 63 MMHG

## 2017-07-16 DIAGNOSIS — J20.8 VIRAL BRONCHITIS: ICD-10-CM

## 2017-07-16 PROCEDURE — 99283 EMERGENCY DEPT VISIT LOW MDM: CPT

## 2017-07-16 RX ORDER — BENZONATATE 200 MG/1
200 CAPSULE ORAL 3 TIMES DAILY PRN
Qty: 21 CAPSULE | Refills: 0 | Status: SHIPPED | OUTPATIENT
Start: 2017-07-16 | End: 2017-09-19

## 2017-07-16 RX ORDER — ALBUTEROL SULFATE 90 UG/1
2 AEROSOL, METERED RESPIRATORY (INHALATION) EVERY 4 HOURS PRN
Qty: 1 INHALER | Refills: 1 | Status: SHIPPED | OUTPATIENT
Start: 2017-07-16

## 2017-07-16 ASSESSMENT — ENCOUNTER SYMPTOMS
SORE THROAT: 0
SLEEP DISTURBANCE: 1
RHINORRHEA: 0
FEVER: 0
COUGH: 1

## 2017-07-16 NOTE — ED AVS SNAPSHOT
[facility] Emergency Department    [address]    [facility] [address]    Phone:  [phone]    Fax:  [phone]                                       Juana Madrigal   MRN: [id number]    Department:  [facility] Emergency Department   Date of Visit:  7/16/2017           After Visit Summary Signature Page     I have received my discharge instructions, and my questions have been answered. I have discussed any challenges I see with this plan with the nurse or doctor.    ..........................................................................................................................................  Patient/Patient Representative Signature      ..........................................................................................................................................  Patient Representative Print Name and Relationship to Patient    ..................................................               ................................................  Date                                            Time    ..........................................................................................................................................  Reviewed by Signature/Title    ...................................................              ..............................................  Date                                                            Time

## 2017-07-16 NOTE — ED PROVIDER NOTES
History     Chief Complaint:  Cough    HPI   Juana Chago Madrigal is a 27 year old female, with a history of asthma, who presents to the emergency department today for evaluation of a cough. She reports she began to have difficulty breathing and a dry cough yesterday. She has had difficulty sleeping secondary to the cough. She has not taken anything for her symptoms.  There have been no aggravating factors. She denies any associated chest pain. Due to persistent symptoms, she presents to the ED for further evaluation. She denies having a fever, runny nose, and sore throat.      Allergies:  No Known Drug Allergies     Medications:    Methotrexate    Past Medical History:    GERD  Rheumatoid Arthritis    Past Surgical History:    History reviewed. No pertinent past surgical history.    Family History:      History reviewed. No pertinent family history.     Social History:  The patient was accompanied to the ED by family.  Smoking Status: never  Smokeless Tobacco: never  Alcohol Use: no  Marital Status:  Single      Review of Systems   Constitutional: Negative for fever.   HENT: Negative for rhinorrhea and sore throat.    Respiratory: Positive for cough.         Difficulty breathing   Psychiatric/Behavioral: Positive for sleep disturbance.   All other systems reviewed and are negative.      Physical Exam   First Vitals:  /63  Pulse 78  Temp 99.6  F (37.6  C) (Temporal)  Resp 18  LMP 06/19/2017  SpO2 99%       Physical Exam    GEN:    Pleasant, age appropriate.     Resting comfortably in the bed.  HEENT:    Tympanic membranes are clear bilateral.      Oropharynx is moist.       No tonsillar erythema, exudate or asymmetric edema.     No deviation of the uvula.     No pooling of secretions, trismus or sublingual edema.  Eyes:    Conjunctiva normal, PERRL  Neck:    Supple, no meningismus.       No pain with manipulation of the hyoid.   CV:     Regular rate and rhythm.     No murmurs, rubs or gallops.       No lower extremity edema.   PULM:    Clear to auscultation bilateral.       Intermittent non-productive cough     No respiratory distress.       No stridor, rales or wheezing.  ABD:    Soft, non-tender, non-distended.      No rebound or guarding.     No splenomegaly.  MSK:     No gross deformity to all four extremities.   LYMPH:   No cervical lymphadenopathy.  NEURO:   Alert.  Normal muscular tone, no atrophy.  Skin:    Warm, dry and intact.    PSYCH:    Mood is good and affect is appropriate.      Emergency Department Course     Emergency Department Course:  Nursing notes and vitals reviewed.  0445: I performed an exam of the patient as documented above.   I discussed the treatment plan with the patient. They expressed understanding of this plan and consented to discharge. They will be discharged home with instructions for care and follow up. In addition, the patient will return to the emergency department if their symptoms persist, worsen, if new symptoms arise or if there is any concern.  All questions were answered.    Impression & Plan      Medical Decision Making:  Juana Madrigal is a 27 year old female with a history of asthma, who presents to the emergency department with cough and mild associated difficulty breathing. Upon examination, she appears well. She has no evidence of acute bronchospasm. No focal findings drawing concern for pneumonia. Findings are consistent with a viral bronchitis. Use albuterol inhaler as needed and tessalon pearles to assist with the cough. Follow up with PCP in 5 days if symptoms are not improved.       Diagnosis:    ICD-10-CM    1. Viral bronchitis J20.8      Disposition:   Discharged to home with the below prescription     Discharge Medications:  New Prescriptions    ALBUTEROL (PROAIR HFA/PROVENTIL HFA/VENTOLIN HFA) 108 (90 BASE) MCG/ACT INHALER    Inhale 2 puffs into the lungs every 4 hours as needed for shortness of breath / dyspnea or wheezing     BENZONATATE (TESSALON) 200 MG CAPSULE    Take 1 capsule (200 mg) by mouth 3 times daily as needed for cough     Scribe Disclosure:  I, Zohra Fall, am serving as a scribe at 4:37 AM on 7/16/2017 to document services personally performed by Romulo Augustine MD, based on my observations and the provider's statements to me.  7/16/2017   [facility] EMERGENCY DEPARTMENT       Romulo Augustine MD  07/17/17 0042

## 2017-07-16 NOTE — ED NOTES
Pt here for non-productive cough and sob started since yesterday. ABCs intact, respiration even and unlabored, AA&Ox4, GCS 15.

## 2017-07-16 NOTE — ED AVS SNAPSHOT
[facility] Emergency Department    [address] [place] [address]    Phone:  [phone]    Fax:  [phone]                                       Juana Madrigal   MRN: [id number]    Department:  [facility] Emergency Department   Date of Visit:  7/16/2017           Patient Information     Date Of Birth          1990        Your diagnoses for this visit were:     Viral bronchitis        You were seen by Romulo Augustine MD.        Discharge Instructions       Please make an appointment to follow up with [facility] [phone] in 5-7 days if not improving.      Discharge Instructions  Bronchitis, Pneumonia, Bronchospasm    You were seen today for a chest infection or inflammation. If your doctor decided this was due to a bacterial infection, you may need an antibiotic. Sometimes these are caused by a virus, and then an antibiotic will not help.     Return to the Emergency Department if:    Your breathing gets much worse.    You are very weak, or feel much more ill.    You develop new symptoms, such as chest pain.    You cough up blood.    You are vomiting enough that you can t keep fluids or your medicine down.    What can I do to help myself?    Fill any prescriptions the doctor gave you and take them right away--especially antibiotics. Be sure to finish the whole antibiotic prescription.    You may be given a prescription for an inhaler, which can help loosen tight air passages.  Use this as needed, but not more often than directed. Inhalers work much better when used with a spacer.     You may be given a prescription for a steroid to reduce inflammation. Used long-term, these can have many serious side effects, but for short courses these do not happen. You may notice restlessness or increased appetite.        You may use non-prescription cough or cold medicines. Cough medicines may help, but don t make the cough go away  "completely.     Avoid smoke, because this can make your symptoms worse. If you smoke, this may be a good time to quit! Consider using nicotine lozenges, gum, or patches to reduce cravings.     If you have a fever, Tylenol  (acetaminophen), Motrin  (ibuprofen), or Advil  (ibuprofen) may help bring fever down and may help you feel more comfortable. Be sure to read and follow the package directions, and ask your doctor if you have questions.    Be sure to get your flu shot each year.  The pneumonia shot can help prevent pneumonia.  Probiotics: If you have been given an antibiotic, you may want to also take a probiotic pill or eat yogurt with live cultures. Probiotics have \"good bacteria\" to help your intestines stay healthy. Studies have shown that probiotics help prevent diarrhea and other intestine problems (including C. diff infection) when you take antibiotics. You can buy these without a prescription in the pharmacy section of the store.     If your doctor has told you to follow-up at your clinic, be sure to call right away and go to your appointment.  If there is any problem with keeping your appointment, call your doctor or return to the Emergency Department.    If you were given a prescription for medicine here today, be sure to read all of the information (including the package insert) that comes with your prescription.  This will include important information about the medicine, its side effects, and any warnings that you need to know about.  The pharmacist who fills the prescription can provide more information and answer questions you may have about the medicine.  If you have questions or concerns that the pharmacist cannot address, please call or return to the Emergency Department.         Future Appointments        Provider Department Dept Phone Center    7/17/2017 3:40 PM Alexandria Galarza NP [facility] [phone] [place]      24 Hour Appointment Hotline       To make an appointment at " any [facility] clinic, call [phone] ([phone]). If you don't have a family doctor or clinic, we will help you find one. [facility] are conveniently located to serve the needs of you and your family.             Review of your medicines      START taking        Dose / Directions Last dose taken    albuterol 108 (90 BASE) MCG/ACT Inhaler   Commonly known as:  PROAIR HFA/PROVENTIL HFA/VENTOLIN HFA   Dose:  2 puff   Quantity:  1 Inhaler        Inhale 2 puffs into the lungs every 4 hours as needed for shortness of breath / dyspnea or wheezing   Refills:  1        benzonatate 200 MG capsule   Commonly known as:  TESSALON   Dose:  200 mg   Quantity:  21 capsule        Take 1 capsule (200 mg) by mouth 3 times daily as needed for cough   Refills:  0          Our records show that you are taking the medicines listed below. If these are incorrect, please call your family doctor or clinic.        Dose / Directions Last dose taken    METHOTREXATE PO        Take by mouth once a week   Refills:  0                Prescriptions were sent or printed at these locations (2 Prescriptions)                   Other Prescriptions                Printed at Department/Unit printer (2 of 2)         albuterol (PROAIR HFA/PROVENTIL HFA/VENTOLIN HFA) 108 (90 BASE) MCG/ACT Inhaler               benzonatate (TESSALON) 200 MG capsule                Orders Needing Specimen Collection     None      Pending Results     No orders found from 7/14/2017 to 7/17/2017.            Pending Culture Results     No orders found from 7/14/2017 to 7/17/2017.            Pending Results Instructions     If you had any lab results that were not finalized at the time of your Discharge, you can call the ED Lab Result RN at [phone]. You will be contacted by this team for any positive Lab results or changes in treatment. The nurses are available 7 days a week from 10A to 6:30P.  You can leave a message 24 hours per day and they will return your  call.        Test Results From Your Hospital Stay               Clinical Quality Measure: Blood Pressure Screening     Your blood pressure was checked while you were in the emergency department today. The last reading we obtained was  BP: 103/74 . Please read the guidelines below about what these numbers mean and what you should do about them.  If your systolic blood pressure (the top number) is less than 120 and your diastolic blood pressure (the bottom number) is less than 80, then your blood pressure is normal. There is nothing more that you need to do about it.  If your systolic blood pressure (the top number) is 120-139 or your diastolic blood pressure (the bottom number) is 80-89, your blood pressure may be higher than it should be. You should have your blood pressure rechecked within a year by a primary care provider.  If your systolic blood pressure (the top number) is 140 or greater or your diastolic blood pressure (the bottom number) is 90 or greater, you may have high blood pressure. High blood pressure is treatable, but if left untreated over time it can put you at risk for heart attack, stroke, or kidney failure. You should have your blood pressure rechecked by a primary care provider within the next 4 weeks.  If your provider in the emergency department today gave you specific instructions to follow-up with your doctor or provider even sooner than that, you should follow that instruction and not wait for up to 4 weeks for your follow-up visit.        Thank you for choosing [facility]       Thank you for choosing [facility] for your care. Our goal is always to provide you with excellent care. Hearing back from our patients is one way we can continue to improve our services. Please take a few minutes to complete the written survey that you may receive in the mail after you visit with us. Thank you!        [employer] Information     [employer] lets you send messages to your doctor, view your test results, renew  "your prescriptions, schedule appointments and more. To sign up, go to [email or web address] . Click on \"Log in\" on the left side of the screen, which will take you to the Welcome page. Then click on \"Sign up Now\" on the right side of the page.     You will be asked to enter the access code listed below, as well as some personal information. Please follow the directions to create your username and password.     Your access code is: [email or web address]  Expires: 9/3/2017  5:44 AM     Your access code will  in 90 days. If you need help or a new code, please call your [facility] clinic or [phone].        Care EveryWhere ID     This is your Care EveryWhere ID. This could be used by other organizations to access your [facility] medical records  [id number]        Equal Access to Services     ENDY MARCIAL : Vern Casillas, mikayla kirkpatrick, aric vergara, homero cevallos . So [facility] [phone].    ATENCIÓN: Si habla español, tiene a lawrence disposición servicios gratuitos de asistencia lingüística. Llame al [phone].    We comply with applicable federal civil rights laws and [place] laws. We do not discriminate on the basis of race, color, national origin, age, disability sex, sexual orientation or gender identity.            After Visit Summary       This is your record. Keep this with you and show to your community pharmacist(s) and doctor(s) at your next visit.                  "

## 2017-07-16 NOTE — DISCHARGE INSTRUCTIONS
Please make an appointment to follow up with [facility] [phone] in 5-7 days if not improving.      Discharge Instructions  Bronchitis, Pneumonia, Bronchospasm    You were seen today for a chest infection or inflammation. If your doctor decided this was due to a bacterial infection, you may need an antibiotic. Sometimes these are caused by a virus, and then an antibiotic will not help.     Return to the Emergency Department if:    Your breathing gets much worse.    You are very weak, or feel much more ill.    You develop new symptoms, such as chest pain.    You cough up blood.    You are vomiting enough that you can t keep fluids or your medicine down.    What can I do to help myself?    Fill any prescriptions the doctor gave you and take them right away--especially antibiotics. Be sure to finish the whole antibiotic prescription.    You may be given a prescription for an inhaler, which can help loosen tight air passages.  Use this as needed, but not more often than directed. Inhalers work much better when used with a spacer.     You may be given a prescription for a steroid to reduce inflammation. Used long-term, these can have many serious side effects, but for short courses these do not happen. You may notice restlessness or increased appetite.        You may use non-prescription cough or cold medicines. Cough medicines may help, but don t make the cough go away completely.     Avoid smoke, because this can make your symptoms worse. If you smoke, this may be a good time to quit! Consider using nicotine lozenges, gum, or patches to reduce cravings.     If you have a fever, Tylenol  (acetaminophen), Motrin  (ibuprofen), or Advil  (ibuprofen) may help bring fever down and may help you feel more comfortable. Be sure to read and follow the package directions, and ask your doctor if you have questions.    Be sure to get your flu shot each year.  The pneumonia shot can help prevent  "pneumonia.  Probiotics: If you have been given an antibiotic, you may want to also take a probiotic pill or eat yogurt with live cultures. Probiotics have \"good bacteria\" to help your intestines stay healthy. Studies have shown that probiotics help prevent diarrhea and other intestine problems (including C. diff infection) when you take antibiotics. You can buy these without a prescription in the pharmacy section of the store.     If your doctor has told you to follow-up at your clinic, be sure to call right away and go to your appointment.  If there is any problem with keeping your appointment, call your doctor or return to the Emergency Department.    If you were given a prescription for medicine here today, be sure to read all of the information (including the package insert) that comes with your prescription.  This will include important information about the medicine, its side effects, and any warnings that you need to know about.  The pharmacist who fills the prescription can provide more information and answer questions you may have about the medicine.  If you have questions or concerns that the pharmacist cannot address, please call or return to the Emergency Department.       "

## 2017-07-27 ENCOUNTER — HOSPITAL ENCOUNTER (EMERGENCY)
Facility: CLINIC | Age: 27
Discharge: HOME | End: 2017-07-27
Attending: EMERGENCY MEDICINE | Admitting: EMERGENCY MEDICINE
Payer: COMMERCIAL

## 2017-07-27 VITALS
DIASTOLIC BLOOD PRESSURE: 72 MMHG | WEIGHT: 185 LBS | RESPIRATION RATE: 18 BRPM | BODY MASS INDEX: 25.09 KG/M2 | HEART RATE: 87 BPM | TEMPERATURE: 98.2 F | OXYGEN SATURATION: 98 % | SYSTOLIC BLOOD PRESSURE: 115 MMHG

## 2017-07-27 DIAGNOSIS — J45.909 UNCOMPLICATED ASTHMA, UNSPECIFIED ASTHMA SEVERITY: ICD-10-CM

## 2017-07-27 PROCEDURE — 99282 EMERGENCY DEPT VISIT SF MDM: CPT

## 2017-07-27 RX ORDER — FLUTICASONE PROPIONATE 110 UG/1
2 AEROSOL, METERED RESPIRATORY (INHALATION) 2 TIMES DAILY
Qty: 1 INHALER | Refills: 0 | Status: SHIPPED | OUTPATIENT
Start: 2017-07-27

## 2017-07-27 RX ORDER — ALBUTEROL SULFATE 90 UG/1
2 AEROSOL, METERED RESPIRATORY (INHALATION) EVERY 4 HOURS PRN
Qty: 1 INHALER | Refills: 0 | Status: SHIPPED | OUTPATIENT
Start: 2017-07-27

## 2017-07-27 RX ORDER — PREDNISONE 20 MG/1
TABLET ORAL
Qty: 10 TABLET | Refills: 0 | Status: SHIPPED | OUTPATIENT
Start: 2017-07-27 | End: 2017-09-19

## 2017-07-27 ASSESSMENT — ENCOUNTER SYMPTOMS
COUGH: 1
SHORTNESS OF BREATH: 1

## 2017-07-27 NOTE — ED AVS SNAPSHOT
[facility] Emergency Department    [address]    [facility] [address]    Phone:  [phone]    Fax:  [phone]                                       Juana Madrigal   MRN: [id number]    Department:  [facility] Emergency Department   Date of Visit:  7/27/2017           Patient Information     Date Of Birth          1990        Your diagnoses for this visit were:     Uncomplicated asthma, unspecified asthma severity        You were seen by Winnie Landa MD and Melissa Rivera MD.      Follow-up Information     Follow up with Clinic, [facility] In 3 days.    Specialty:  Internal Medicine    Contact information:    [address]  [facility] [address]  [phone]        24 Hour Appointment Hotline       To make an appointment at any [facility], call [phone] ([phone]). If you don't have a family doctor or clinic, we will help you find one. [facility] clinics are conveniently located to serve the needs of you and your family.             Review of your medicines      START taking        Dose / Directions Last dose taken    fluticasone 110 MCG/ACT Inhaler   Commonly known as:  FLOVENT HFA   Dose:  2 puff   Quantity:  1 Inhaler        Inhale 2 puffs into the lungs 2 times daily   Refills:  0        Phenylephrine-APAP-Guaifenesin -400 MG/20ML Liqd   Commonly known as:  MUCINEX FAST-MAX COLD & SINUS   Dose:  5 mL   Quantity:  177 mL        Take 5 mLs by mouth 2 times daily as needed Use according to package directions   Refills:  0        predniSONE 20 MG tablet   Commonly known as:  DELTASONE   Quantity:  10 tablet        Take two tablets (= 40mg) each day for 5 (five) days   Refills:  0          Our records show that you are taking the medicines listed below. If these are incorrect, please call your family doctor or clinic.        Dose / Directions Last dose taken    benzonatate 200 MG capsule   Commonly known as:   TESSALON   Dose:  200 mg   Quantity:  21 capsule        Take 1 capsule (200 mg) by mouth 3 times daily as needed for cough   Refills:  0        METHOTREXATE PO        Take by mouth once a week   Refills:  0          ASK your doctor about these medications        Dose / Directions Last dose taken    * albuterol 108 (90 BASE) MCG/ACT Inhaler   Commonly known as:  PROAIR HFA/PROVENTIL HFA/VENTOLIN HFA   Dose:  2 puff   What changed:  Another medication with the same name was added. Make sure you understand how and when to take each.   Quantity:  1 Inhaler   Ask about: Which instructions should I use?        Inhale 2 puffs into the lungs every 4 hours as needed for shortness of breath / dyspnea or wheezing   Refills:  1        * albuterol 108 (90 BASE) MCG/ACT Inhaler   Commonly known as:  albuterol   Dose:  2 puff   What changed:  You were already taking a medication with the same name, and this prescription was added. Make sure you understand how and when to take each.   Quantity:  1 Inhaler   Ask about: Which instructions should I use?        Inhale 2 puffs into the lungs every 4 hours as needed for shortness of breath / dyspnea   Refills:  0        * Notice:  This list has 2 medication(s) that are the same as other medications prescribed for you. Read the directions carefully, and ask your doctor or other care provider to review them with you.            Prescriptions were sent or printed at these locations (4 Prescriptions)                   Other Prescriptions                Printed at Department/Unit printer (4 of 4)         predniSONE (DELTASONE) 20 MG tablet               fluticasone (FLOVENT HFA) 110 MCG/ACT Inhaler               albuterol (ALBUTEROL) 108 (90 BASE) MCG/ACT Inhaler               Phenylephrine-APAP-Guaifenesin (MUCINEX FAST-MAX COLD & SINUS) -400 MG/20ML LIQD                Orders Needing Specimen Collection     None      Pending Results     No orders found from 7/25/2017 to 7/28/2017.             Pending Culture Results     No orders found from 7/25/2017 to 7/28/2017.            Pending Results Instructions     If you had any lab results that were not finalized at the time of your Discharge, you can call the ED Lab Result RN at [phone]. You will be contacted by this team for any positive Lab results or changes in treatment. The nurses are available 7 days a week from 10A to 6:30P.  You can leave a message 24 hours per day and they will return your call.        Test Results From Your Hospital Stay               Clinical Quality Measure: Blood Pressure Screening     Your blood pressure was checked while you were in the emergency department today. The last reading we obtained was  BP: 115/72 . Please read the guidelines below about what these numbers mean and what you should do about them.  If your systolic blood pressure (the top number) is less than 120 and your diastolic blood pressure (the bottom number) is less than 80, then your blood pressure is normal. There is nothing more that you need to do about it.  If your systolic blood pressure (the top number) is 120-139 or your diastolic blood pressure (the bottom number) is 80-89, your blood pressure may be higher than it should be. You should have your blood pressure rechecked within a year by a primary care provider.  If your systolic blood pressure (the top number) is 140 or greater or your diastolic blood pressure (the bottom number) is 90 or greater, you may have high blood pressure. High blood pressure is treatable, but if left untreated over time it can put you at risk for heart attack, stroke, or kidney failure. You should have your blood pressure rechecked by a primary care provider within the next 4 weeks.  If your provider in the emergency department today gave you specific instructions to follow-up with your doctor or provider even sooner than that, you should follow that instruction and not wait for up to 4 weeks for your follow-up  "visit.        Thank you for choosing [facility]       Thank you for choosing [facility] for your care. Our goal is always to provide you with excellent care. Hearing back from our patients is one way we can continue to improve our services. Please take a few minutes to complete the written survey that you may receive in the mail after you visit with us. Thank you!        [employer] Information     [employer] lets you send messages to your doctor, view your test results, renew your prescriptions, schedule appointments and more. To sign up, go to [email or web address] . Click on \"Log in\" on the left side of the screen, which will take you to the Welcome page. Then click on \"Sign up Now\" on the right side of the page.     You will be asked to enter the access code listed below, as well as some personal information. Please follow the directions to create your username and password.     Your access code is: [email or web address]  Expires: 9/3/2017  5:44 AM     Your access code will  in 90 days. If you need help or a new code, please call your [facility] clinic or [phone].        Care EveryWhere ID     This is your Care EveryWhere ID. This could be used by other organizations to access your [facility] medical records  [id number]        Equal Access to Services     ENDY MARCIAL : Vern bradfordo [place], waaxda luqadaha, qaybta kaalmada adeegyada, homero parra. So [facility] [phone].    ATENCIÓN: Si habla español, tiene a lawrence disposición servicios gratuitos de asistencia lingüística. Llame al [phone].    We comply with applicable federal civil rights laws and [place] laws. We do not discriminate on the basis of race, color, national origin, age, disability sex, sexual orientation or gender identity.            After Visit Summary       This is your record. Keep this with you and show to your community pharmacist(s) and doctor(s) at your next visit.                  "

## 2017-07-27 NOTE — ED AVS SNAPSHOT
[facility] Emergency Department    [address]    [facility] [address]    Phone:  [phone]    Fax:  [phone]                                       Juana Madrigal   MRN: [id number]    Department:  [facility] Emergency Department   Date of Visit:  7/27/2017           After Visit Summary Signature Page     I have received my discharge instructions, and my questions have been answered. I have discussed any challenges I see with this plan with the nurse or doctor.    ..........................................................................................................................................  Patient/Patient Representative Signature      ..........................................................................................................................................  Patient Representative Print Name and Relationship to Patient    ..................................................               ................................................  Date                                            Time    ..........................................................................................................................................  Reviewed by Signature/Title    ...................................................              ..............................................  Date                                                            Time

## 2017-07-28 NOTE — ED PROVIDER NOTES
History     Chief Complaint:  Cough      HPI   Juana Chago Madrigal is a 27 year old female who presents for evaluation of a cough. On 7/16/2017, the patient was seen in the ED by Dr. Augustine for a single day history of a cough and she was diagnosed with a viral bronchitis and prescribed an Albuterol inhaler and Tessalon Perles for symptom management. Today she presents for recurrent asthma coupled with a cough and chest congestion. She reports that she usually uses her inhaler when she feels difficulty breathing, but since she does not have an inhaler anymore she has no way to relieve her symptoms. She states the congestion and cough only come about when she finds her asthma spiking. She has no other reported symptoms.     Allergies:  No known drug allergies.    Medications:    albuterol (PROAIR HFA/PROVENTIL HFA/VENTOLIN HFA) 108 (90 BASE) MCG/ACT Inhaler  benzonatate (TESSALON) 200 MG capsule  METHOTREXATE PO    Past Medical History:    GERD  Rheumatoid arthritis     Past Surgical History:    History reviewed. No pertinent past surgical history.     Family History:    History reviewed. No pertinent family history.     Social History:  Tobacco use:    Never smoker  Alcohol use:    Negative  Marital status:    Single   Accompanied to ED by:  Boy friend     Review of Systems   HENT: Positive for congestion.    Respiratory: Positive for cough and shortness of breath.    All other systems reviewed and are negative.      Physical Exam     Patient Vitals for the past 24 hrs:   BP Temp Temp src Pulse Resp SpO2 Weight   07/27/17 2044 115/72 98.2  F (36.8  C) Oral 87 18 98 % 83.9 kg (185 lb)         Physical Exam   Constitutional: She is cooperative.   HENT:   Mouth/Throat: Oropharynx is clear and moist and mucous membranes are normal. No trismus in the jaw. No uvula swelling. No tonsillar abscesses.   Neck: Phonation normal.   Cardiovascular: Normal heart sounds.    Pulmonary/Chest: Breath sounds normal. No  stridor. She has no wheezes. She has no rhonchi.   Lymphadenopathy:     She has no cervical adenopathy.   Neurological: She is alert.       Emergency Department Course     Emergency Department Course:  Nursing notes and vitals reviewed.  (2059) I performed an exam of the patient as documented above.    Findings and plan explained to the patient. Patient discharged home with instructions regarding supportive care, medications, and reasons to return. The importance of close follow-up was reviewed. The patient was prescribed Prednisone, Fluticasone, albuterol, and Mucinex.      Impression & Plan      Medical Decision Making:  Juana Madrigal is a 27 year old female who presents to the ED with on going cough. She reported that after her recent visit she did get significant relief from her med dose inhaler but now has lost it. She still complains of congestion in the chest. She reported that she was using her inhaler multiple times per day. This suggests that she requires a controller medication. I think given her more severe symptoms I should start with a  Course of oral prednisone while initiating Flovent. I will refill her albuterol. She requested a liquid to help with mucus. I prescribed a Mucinex product. Close follow up with primary care. I have given her information for the [place] [facility] on call. Return if problems.    Diagnosis:    ICD-10-CM    1. Uncomplicated asthma, unspecified asthma severity J45.909           Disposition:  Patient is discharged to home.      Discharge Medications:  New Prescriptions    ALBUTEROL (ALBUTEROL) 108 (90 BASE) MCG/ACT INHALER    Inhale 2 puffs into the lungs every 4 hours as needed for shortness of breath / dyspnea    FLUTICASONE (FLOVENT HFA) 110 MCG/ACT INHALER    Inhale 2 puffs into the lungs 2 times daily    PHENYLEPHRINE-APAP-GUAIFENESIN (MUCINEX FAST-MAX COLD & SINUS) -400 MG/20ML LIQD    Take 5 mLs by mouth 2 times daily as needed Use according to package  directions    PREDNISONE (DELTASONE) 20 MG TABLET    Take two tablets (= 40mg) each day for 5 (five) days         Saud Godoy  7/27/2017   [facility] EMERGENCY DEPARTMENT    I, Jignesh Barrientos, am serving as a scribe on 7/27/2017 at 8:59 PM to personally document services performed by Dr. Rivera based on my observations and the provider's statements to me.          Melissa Rivera MD  07/27/17 2814

## 2017-07-28 NOTE — ED NOTES
27-year-old female presents to the ER with complaints of cough. States she was recently seen and was given an inhaler which she has lost and she would like a prescription for another one. No resp distress noted on arrival to the ER.

## 2017-09-19 ENCOUNTER — HOSPITAL ENCOUNTER (EMERGENCY)
Facility: CLINIC | Age: 27
Discharge: HOME | End: 2017-09-19
Attending: EMERGENCY MEDICINE | Admitting: EMERGENCY MEDICINE
Payer: COMMERCIAL

## 2017-09-19 VITALS
DIASTOLIC BLOOD PRESSURE: 79 MMHG | SYSTOLIC BLOOD PRESSURE: 115 MMHG | RESPIRATION RATE: 16 BRPM | WEIGHT: 180 LBS | TEMPERATURE: 98.2 F | BODY MASS INDEX: 24.41 KG/M2 | OXYGEN SATURATION: 98 %

## 2017-09-19 DIAGNOSIS — J02.9 ACUTE VIRAL PHARYNGITIS: ICD-10-CM

## 2017-09-19 LAB
DEPRECATED S PYO AG THROAT QL EIA: NORMAL
SPECIMEN SOURCE: NORMAL

## 2017-09-19 PROCEDURE — 25000132 ZZH RX MED GY IP 250 OP 250 PS 637: Performed by: EMERGENCY MEDICINE

## 2017-09-19 PROCEDURE — 99283 EMERGENCY DEPT VISIT LOW MDM: CPT

## 2017-09-19 PROCEDURE — 87081 CULTURE SCREEN ONLY: CPT | Performed by: EMERGENCY MEDICINE

## 2017-09-19 PROCEDURE — 87880 STREP A ASSAY W/OPTIC: CPT | Performed by: EMERGENCY MEDICINE

## 2017-09-19 RX ORDER — ACETAMINOPHEN 500 MG
1000 TABLET ORAL EVERY 8 HOURS PRN
Qty: 30 TABLET | Refills: 0 | Status: SHIPPED | OUTPATIENT
Start: 2017-09-19

## 2017-09-19 RX ORDER — ACETAMINOPHEN 500 MG
1000 TABLET ORAL ONCE
Status: COMPLETED | OUTPATIENT
Start: 2017-09-19 | End: 2017-09-19

## 2017-09-19 RX ADMIN — ACETAMINOPHEN 1000 MG: 500 TABLET, FILM COATED ORAL at 20:26

## 2017-09-19 ASSESSMENT — ENCOUNTER SYMPTOMS
ABDOMINAL PAIN: 1
FREQUENCY: 0
HEADACHES: 1
COUGH: 1
NAUSEA: 1
SORE THROAT: 1
SHORTNESS OF BREATH: 1
RHINORRHEA: 0
DYSURIA: 0
CHILLS: 1
FEVER: 0
VOMITING: 0
APPETITE CHANGE: 1

## 2017-09-19 NOTE — ED AVS SNAPSHOT
[facility] Emergency Department    [address]    [facility] [address]    Phone:  [phone]    Fax:  [phone]                                       Juana Madrigal   MRN: [id number]    Department:  [facility] Emergency Department   Date of Visit:  9/19/2017           After Visit Summary Signature Page     I have received my discharge instructions, and my questions have been answered. I have discussed any challenges I see with this plan with the nurse or doctor.    ..........................................................................................................................................  Patient/Patient Representative Signature      ..........................................................................................................................................  Patient Representative Print Name and Relationship to Patient    ..................................................               ................................................  Date                                            Time    ..........................................................................................................................................  Reviewed by Signature/Title    ...................................................              ..............................................  Date                                                            Time

## 2017-09-19 NOTE — ED AVS SNAPSHOT
[facility] Emergency Department    [address]    [facility] [address]    Phone:  [phone]    Fax:  [phone]                                       Juana Madrigal   MRN: [id number]    Department:  [facility] Emergency Department   Date of Visit:  9/19/2017           Patient Information     Date Of Birth          1990        Your diagnoses for this visit were:     Acute viral pharyngitis        You were seen by Alin Gordillo MD.      Follow-up Information     Follow up with [facility]. Schedule an appointment as soon as possible for a visit in 3 days.    Specialty:  Family Medicine    Why:  For close follow up    Contact information:    [address] [facility] [address] [phone]        Discharge Instructions       Discharge Instructions  Sore Throat  You were seen today for a sore throat.  Most (>80%) sore throats are caused by a virus. Antibiotics do not help with viral infections, but you can fight off the virus on your own.  In this case, your sore throat would be treated with medications for your pain and fever.    Strep throat is a kind of sore throat caused by Group A streptococcus bacteria.  This type of sore throat is treated with antibiotics.  If you had a rapid test done today for strep throat and it did not show infection, we may do a culture. If the culture shows you have strep throat, we will call you and get you a prescription for antibiotics.  Generally, every Emergency Department visit should have a follow-up clinic visit with either a primary or a specialty clinic/provider. Please follow-up as instructed by your emergency provider today.  Return to the Emergency Department if:    If you have difficulty breathing.    If you are drooling because you are unable to swallow.    You become dehydrated due to difficulty drinking. Signs of dehydration include weakness, dry mouth, and urinating  less than 3 times per day.    If you develop swelling of the neck or tongue.    If you develop a high fever with either severe or unusual headache or stiff neck.    Treatment:      Pain relief -- Non-prescription pain medications, such as Tylenol  (acetaminophen) or Motrin , Advil  (ibuprofen) are usually recommended for pain.  Do not use a medicine that you are allergic to, or if your provider has told you not to use it.    Soft or liquid diet. Concentrate on liquids to keep yourself hydrated. Cold liquids (popsicles, ice cream, etc.) may feel good on your throat.  If you were given a prescription for medicine here today, be sure to read all of the information (including the package insert) that comes with your prescription.  This will include important information about the medicine, its side effects, and any warnings that you need to know about.  The pharmacist who fills the prescription can provide more information and answer questions you may have about the medicine.  If you have questions or concerns that the pharmacist cannot address, please call or return to the Emergency Department.   Remember that you can always come back to the Emergency Department if you are not able to see your regular provider in the amount of time listed above, if you get any new symptoms, or if there is anything that worries you.    24 Hour Appointment Hotline       To make an appointment at any [facility], call [phone] ([phone]). If you don't have a family doctor or clinic, we will help you find one. [facility] clinics are conveniently located to serve the needs of you and your family.             Review of your medicines      START taking        Dose / Directions Last dose taken    acetaminophen 500 MG tablet   Commonly known as:  ACETAMINOPHEN EXTRA STRENGTH   Dose:  1000 mg   Quantity:  30 tablet        Take 2 tablets (1,000 mg) by mouth every 8 hours as needed for fever or pain   Refills:  0          Our records  show that you are taking the medicines listed below. If these are incorrect, please call your family doctor or clinic.        Dose / Directions Last dose taken    * albuterol 108 (90 BASE) MCG/ACT Inhaler   Commonly known as:  PROAIR HFA/PROVENTIL HFA/VENTOLIN HFA   Dose:  2 puff   Quantity:  1 Inhaler        Inhale 2 puffs into the lungs every 4 hours as needed for shortness of breath / dyspnea or wheezing   Refills:  1        * albuterol 108 (90 BASE) MCG/ACT Inhaler   Commonly known as:  PROAIR HFA   Dose:  2 puff   Quantity:  1 Inhaler        Inhale 2 puffs into the lungs every 4 hours as needed for shortness of breath / dyspnea   Refills:  0        fluticasone 110 MCG/ACT Inhaler   Commonly known as:  FLOVENT HFA   Dose:  2 puff   Quantity:  1 Inhaler        Inhale 2 puffs into the lungs 2 times daily   Refills:  0        METHOTREXATE PO        Take by mouth once a week   Refills:  0        * Notice:  This list has 2 medication(s) that are the same as other medications prescribed for you. Read the directions carefully, and ask your doctor or other care provider to review them with you.            Prescriptions were sent or printed at these locations (1 Prescription)                   Other Prescriptions                Printed at Department/Unit printer (1 of 1)         acetaminophen (ACETAMINOPHEN EXTRA STRENGTH) 500 MG tablet                Procedures and tests performed during your visit     Beta strep group A culture    Rapid strep screen      Orders Needing Specimen Collection     None      Pending Results     Date and Time Order Name Status Description    9/19/2017 2025 Beta strep group A culture In process             Pending Culture Results     Date and Time Order Name Status Description    9/19/2017 2025 Beta strep group A culture In process             Pending Results Instructions     If you had any lab results that were not finalized at the time of your Discharge, you can call the ED Lab Result RN at  [phone]. You will be contacted by this team for any positive Lab results or changes in treatment. The nurses are available 7 days a week from 10A to 6:30P.  You can leave a message 24 hours per day and they will return your call.        Test Results From Your Hospital Stay        9/19/2017  8:58 PM      Component Results     Component    Specimen Description    Throat    Rapid Strep A Screen    NEGATIVE: No Group A streptococcal antigen detected by immunoassay, await culture report.         9/19/2017  8:59 PM                Clinical Quality Measure: Blood Pressure Screening     Your blood pressure was checked while you were in the emergency department today. The last reading we obtained was  BP: 115/79 . Please read the guidelines below about what these numbers mean and what you should do about them.  If your systolic blood pressure (the top number) is less than 120 and your diastolic blood pressure (the bottom number) is less than 80, then your blood pressure is normal. There is nothing more that you need to do about it.  If your systolic blood pressure (the top number) is 120-139 or your diastolic blood pressure (the bottom number) is 80-89, your blood pressure may be higher than it should be. You should have your blood pressure rechecked within a year by a primary care provider.  If your systolic blood pressure (the top number) is 140 or greater or your diastolic blood pressure (the bottom number) is 90 or greater, you may have high blood pressure. High blood pressure is treatable, but if left untreated over time it can put you at risk for heart attack, stroke, or kidney failure. You should have your blood pressure rechecked by a primary care provider within the next 4 weeks.  If your provider in the emergency department today gave you specific instructions to follow-up with your doctor or provider even sooner than that, you should follow that instruction and not wait for up to 4 weeks for your follow-up  "visit.        Thank you for choosing [facility]       Thank you for choosing [facility] for your care. Our goal is always to provide you with excellent care. Hearing back from our patients is one way we can continue to improve our services. Please take a few minutes to complete the written survey that you may receive in the mail after you visit with us. Thank you!        [employer] Information     [facility] lets you send messages to your doctor, view your test results, renew your prescriptions, schedule appointments and more. To sign up, go to [email or web address] . Click on \"Log in\" on the left side of the screen, which will take you to the Welcome page. Then click on \"Sign up Now\" on the right side of the page.     You will be asked to enter the access code listed below, as well as some personal information. Please follow the directions to create your username and password.     Your access code is: [email or web address]  Expires: 2017  9:05 PM     Your access code will  in 90 days. If you need help or a new code, please call your [facility] clinic or [phone].        Care EveryWhere ID     This is your Care EveryWhere ID. This could be used by other organizations to access your [facility] medical records  [id number]        Equal Access to Services     ENDY MARCIAL : Vern bradfordo [place], waaxda luqadaha, qaybta kaalmada adeegyada, homero parra. So [facility] [phone].    ATENCIÓN: Si habla español, tiene a lawrence disposición servicios gratuitos de asistencia lingüística. Llame al [phone].    We comply with applicable federal civil rights laws and [place] laws. We do not discriminate on the basis of race, color, national origin, age, disability sex, sexual orientation or gender identity.            After Visit Summary       This is your record. Keep this with you and show to your community pharmacist(s) and doctor(s) at your next visit.                  "

## 2017-09-20 NOTE — DISCHARGE INSTRUCTIONS
Discharge Instructions  Sore Throat  You were seen today for a sore throat.  Most (>80%) sore throats are caused by a virus. Antibiotics do not help with viral infections, but you can fight off the virus on your own.  In this case, your sore throat would be treated with medications for your pain and fever.    Strep throat is a kind of sore throat caused by Group A streptococcus bacteria.  This type of sore throat is treated with antibiotics.  If you had a rapid test done today for strep throat and it did not show infection, we may do a culture. If the culture shows you have strep throat, we will call you and get you a prescription for antibiotics.  Generally, every Emergency Department visit should have a follow-up clinic visit with either a primary or a specialty clinic/provider. Please follow-up as instructed by your emergency provider today.  Return to the Emergency Department if:    If you have difficulty breathing.    If you are drooling because you are unable to swallow.    You become dehydrated due to difficulty drinking. Signs of dehydration include weakness, dry mouth, and urinating less than 3 times per day.    If you develop swelling of the neck or tongue.    If you develop a high fever with either severe or unusual headache or stiff neck.    Treatment:      Pain relief -- Non-prescription pain medications, such as Tylenol  (acetaminophen) or Motrin , Advil  (ibuprofen) are usually recommended for pain.  Do not use a medicine that you are allergic to, or if your provider has told you not to use it.    Soft or liquid diet. Concentrate on liquids to keep yourself hydrated. Cold liquids (popsicles, ice cream, etc.) may feel good on your throat.  If you were given a prescription for medicine here today, be sure to read all of the information (including the package insert) that comes with your prescription.  This will include important information about the medicine, its side effects, and any warnings that you  need to know about.  The pharmacist who fills the prescription can provide more information and answer questions you may have about the medicine.  If you have questions or concerns that the pharmacist cannot address, please call or return to the Emergency Department.   Remember that you can always come back to the Emergency Department if you are not able to see your regular provider in the amount of time listed above, if you get any new symptoms, or if there is anything that worries you.

## 2017-09-20 NOTE — ED NOTES
Patient presents to ED due to multiple c/o. States sore, mid epigastric, abd pain .  States symptoms developed last night.     Took tylenol, ibuprofen, neb   2 hrs PTA without relief.     Hx GERD, ASTHMA

## 2017-09-20 NOTE — ED PROVIDER NOTES
"  History     Chief Complaint:  Many complaints    HPI   Juana Madrigal is a 27 year old female who presents with many complaints. This started with a sore throat and cough that started two nights. She also endorses vague chest pain which she is unable to further describe but seems to occur with coughing, shortness of breath after coughing, chills, headache, abdominal pain, vomiting and decreased appetite. She reports she has RA and takes prednisone regularly. Patient has no sick contacts. She states this feels similar to when she had bronchitis previously. Patient denies fever, rhinorrhea, rash, and urinary symptoms. She reports only one episode of emesis which was NBNB. At the time of the interview she reports her breathing is \"fine\". She is mostly concerned because she has not been able to sleep well due to these symptoms and is asking for \"medication to make me sleep.\"     Allergies:  No known drug allergies.    Medications:    Albuterol  Fluticasone  Methotrexate  Prednisone 10 mg    Past Medical History:    GERD  RA  Asthma    Past Surgical History:    History reviewed. No pertinent surgical history.    Family History:    History reviewed. No pertinent family history.    Social History:  Marital Status: Single  Presents to the ED alone.   Tobacco Use: Never  Alcohol Use: No  PCP: Physician No Ref-Primary     Review of Systems   Constitutional: Positive for appetite change and chills. Negative for fever.   HENT: Positive for sore throat. Negative for rhinorrhea.    Respiratory: Positive for cough and shortness of breath.    Cardiovascular: Positive for chest pain.   Gastrointestinal: Positive for abdominal pain and nausea. Negative for vomiting.   Genitourinary: Negative for dysuria, frequency and urgency.   Skin: Negative for rash.   Neurological: Positive for headaches.   All other systems reviewed and are negative.      Physical Exam   First Vitals:  BP: 108/74  Heart Rate: 82  Temp: 98.2  F (36.8 "  C)  Resp: 16  Weight: 81.6 kg (180 lb)  SpO2: 100 %    Physical Exam  General: Well appearing, nontoxic Resting comfortably  Head:  Scalp, face, and head appear normal  Eyes:  Pupils are equal, round, and reactive to light    No nystagmus    Conjunctivae non-injected and sclerae white  ENT:    The external nose is normal    Pinnae are normal    Bilateral TMs clear without erythema, bulging, or effusion. Auditory canals normal.    The oropharynx is normal, mucous membranes moist    Posterior pharynx without swelling, exudates. Mild erythema over the pharyngeal pillars and posterior pharynx. No other oral lesions.    Uvula is in the midline  Neck:  Normal range of motion    No significant cervical lymphadenopathy    There is no rigidity noted    Trachea is in the midline  CV:  Regular rate and rhythm     Normal S1/S2, no S3/S4    No murmur or rub  Resp:  Lungs are clear and equal bilaterally    There is no tachypnea    No increased work of breathing    No rales, wheezing, or rhonchi  GI:  Abdomen is soft, no rigidity or guarding    No distension, or mass    No rebound tenderness   MS:  Normal muscular tone    Symmetric motor strength    No lower extremity edema  Skin:  No rash or acute skin lesions noted  Neuro:  Awake and alert    Speech is normal and fluent    Moves all extremities spontaneously  Psych: Normal affect.  Appropriate interactions.      Emergency Department Course     Laboratory:  RSS: Negative    Interventions:  2026: Tylenol, 1000 mg, oral     Emergency Department Course:  Nursing notes and vitals reviewed.  I performed an exam of the patient as documented above.  The above workup was undertaken.  2115: I rechecked the patient and discussed results. Patient notes improvement in her symptoms.   Findings and plan explained to the Patient. Patient discharged home, status improved, with instructions regarding supportive care, medications, and reasons to return as well as the importance of close follow-up  was reviewed. Patient was prescribed acetaminophen extra strength.     Impression & Plan      Medical Decision Making:  Juana Madrigal is a 27 year old female who presents with sore throat and clinical evidence of pharyngitis.  The rapid strep test is negative, and formal culture has been set up in the lab. There is no clinical evidence of peritonsillar abscess, retropharyngeal abscess, Lemierre's Syndrome, epiglottis, or Ronnell's angina. The etiology is most likely viral.   I have recommended treatment with analgesics, and we will await formal culture results.  If the culture is positive, an ED physician will call the patient to initiate anti-microbial therapy. Return if increasing pain, change in voice, neck pain, vomiting, fever, or shortness of breath. Follow-up with primary physician if not improving in 3-5 days. Given her well appearance, I would not test further for other etiologies of serious bacterial infections.     Patient has a concurrent URI, making viral etiologies more likely.  If sore throat persists, mono testing indicated. Pt instructed to use over the counter analgesics, cough/cold medications, and throat lozenges, salt water gargles and other supportive care measures. Return precautions were discussed with patient. The patient's questions were answered and the patient was agreeable with discharge.      Diagnosis:    ICD-10-CM    1. Acute viral pharyngitis J02.8     B97.89        Disposition:  Discharged to home.     Discharge Medications:  New Prescriptions    ACETAMINOPHEN (ACETAMINOPHEN EXTRA STRENGTH) 500 MG TABLET    Take 2 tablets (1,000 mg) by mouth every 8 hours as needed for fever or pain         Maria Elena MCLEOD, am serving as a scribe on 9/19/2017 at 7:30 PM to personally document services performed by Dr. Gordillo based on my observations and the provider's statements to me.   [facility] EMERGENCY DEPARTMENT       Alin Gordillo MD  09/20/17 8042

## 2017-09-22 LAB
BACTERIA SPEC CULT: NORMAL
SPECIMEN SOURCE: NORMAL

## 2017-10-17 ENCOUNTER — HOSPITAL ENCOUNTER (EMERGENCY)
Facility: CLINIC | Age: 27
Discharge: HOME | End: 2017-10-17
Attending: EMERGENCY MEDICINE | Admitting: EMERGENCY MEDICINE
Payer: COMMERCIAL

## 2017-10-17 ENCOUNTER — APPOINTMENT (OUTPATIENT)
Dept: GENERAL RADIOLOGY | Facility: CLINIC | Age: 27
End: 2017-10-17
Attending: EMERGENCY MEDICINE
Payer: COMMERCIAL

## 2017-10-17 VITALS
DIASTOLIC BLOOD PRESSURE: 71 MMHG | SYSTOLIC BLOOD PRESSURE: 105 MMHG | OXYGEN SATURATION: 99 % | TEMPERATURE: 98.1 F | RESPIRATION RATE: 18 BRPM | HEART RATE: 75 BPM

## 2017-10-17 DIAGNOSIS — M25.462 EFFUSION OF LEFT KNEE: Primary | ICD-10-CM

## 2017-10-17 DIAGNOSIS — M06.9 FLARE OF RHEUMATOID ARTHRITIS (H): ICD-10-CM

## 2017-10-17 LAB
ANION GAP SERPL CALCULATED.3IONS-SCNC: 7 MMOL/L (ref 3–14)
APPEARANCE FLD: NORMAL
BACTERIA SPEC CULT: NORMAL
BUN SERPL-MCNC: 6 MG/DL (ref 7–30)
CALCIUM SERPL-MCNC: 8.1 MG/DL (ref 8.5–10.1)
CHLORIDE SERPL-SCNC: 105 MMOL/L (ref 94–109)
CO2 SERPL-SCNC: 28 MMOL/L (ref 20–32)
COLOR FLD: YELLOW
CREAT SERPL-MCNC: 0.74 MG/DL (ref 0.52–1.04)
CRYSTALS SNV MICRO: NORMAL
ERYTHROCYTE [DISTWIDTH] IN BLOOD BY AUTOMATED COUNT: 15.3 % (ref 10–15)
GFR SERPL CREATININE-BSD FRML MDRD: >90 ML/MIN/1.7M2
GLUCOSE FLD-MCNC: 2 MG/DL
GLUCOSE SERPL-MCNC: 90 MG/DL (ref 70–99)
GRAM STN SPEC: NORMAL
HCT VFR BLD AUTO: 33.5 % (ref 35–47)
HGB BLD-MCNC: 10.3 G/DL (ref 11.7–15.7)
LYMPHOCYTES NFR FLD MANUAL: 37 %
MCH RBC QN AUTO: 25.8 PG (ref 26.5–33)
MCHC RBC AUTO-ENTMCNC: 30.7 G/DL (ref 31.5–36.5)
MCV RBC AUTO: 84 FL (ref 78–100)
MONOS+MACROS NFR FLD MANUAL: 11 %
NEUTS BAND NFR FLD MANUAL: 52 %
PLATELET # BLD AUTO: 265 10E9/L (ref 150–450)
POTASSIUM SERPL-SCNC: 3.3 MMOL/L (ref 3.4–5.3)
PROT FLD-MCNC: 3.2 G/DL
RBC # BLD AUTO: 3.99 10E12/L (ref 3.8–5.2)
SODIUM SERPL-SCNC: 140 MMOL/L (ref 133–144)
SPECIMEN SOURCE FLD: NORMAL
SPECIMEN SOURCE: NORMAL
SPECIMEN SOURCE: NORMAL
WBC # BLD AUTO: 5.1 10E9/L (ref 4–11)
WBC # FLD AUTO: 9686 /UL

## 2017-10-17 PROCEDURE — 36415 COLL VENOUS BLD VENIPUNCTURE: CPT | Performed by: EMERGENCY MEDICINE

## 2017-10-17 PROCEDURE — 87015 SPECIMEN INFECT AGNT CONCNTJ: CPT | Performed by: EMERGENCY MEDICINE

## 2017-10-17 PROCEDURE — 25000132 ZZH RX MED GY IP 250 OP 250 PS 637: Performed by: EMERGENCY MEDICINE

## 2017-10-17 PROCEDURE — 82945 GLUCOSE OTHER FLUID: CPT | Performed by: EMERGENCY MEDICINE

## 2017-10-17 PROCEDURE — 89060 EXAM SYNOVIAL FLUID CRYSTALS: CPT | Performed by: EMERGENCY MEDICINE

## 2017-10-17 PROCEDURE — 99285 EMERGENCY DEPT VISIT HI MDM: CPT | Mod: 25

## 2017-10-17 PROCEDURE — 87205 SMEAR GRAM STAIN: CPT | Performed by: EMERGENCY MEDICINE

## 2017-10-17 PROCEDURE — 87070 CULTURE OTHR SPECIMN AEROBIC: CPT | Performed by: EMERGENCY MEDICINE

## 2017-10-17 PROCEDURE — 84157 ASSAY OF PROTEIN OTHER: CPT | Performed by: EMERGENCY MEDICINE

## 2017-10-17 PROCEDURE — 20610 DRAIN/INJ JOINT/BURSA W/O US: CPT | Mod: LT

## 2017-10-17 PROCEDURE — 73562 X-RAY EXAM OF KNEE 3: CPT | Mod: LT

## 2017-10-17 PROCEDURE — 80048 BASIC METABOLIC PNL TOTAL CA: CPT | Performed by: EMERGENCY MEDICINE

## 2017-10-17 PROCEDURE — 89051 BODY FLUID CELL COUNT: CPT | Performed by: EMERGENCY MEDICINE

## 2017-10-17 PROCEDURE — 85027 COMPLETE CBC AUTOMATED: CPT | Performed by: EMERGENCY MEDICINE

## 2017-10-17 RX ORDER — HYDROCODONE BITARTRATE AND ACETAMINOPHEN 5; 325 MG/1; MG/1
1-2 TABLET ORAL EVERY 4 HOURS PRN
Qty: 8 TABLET | Refills: 0 | Status: SHIPPED | OUTPATIENT
Start: 2017-10-17 | End: 2024-08-13

## 2017-10-17 RX ORDER — LIDOCAINE HYDROCHLORIDE AND EPINEPHRINE 10; 10 MG/ML; UG/ML
INJECTION, SOLUTION INFILTRATION; PERINEURAL
Status: DISCONTINUED
Start: 2017-10-17 | End: 2017-10-17 | Stop reason: HOSPADM

## 2017-10-17 RX ORDER — HYDROCODONE BITARTRATE AND ACETAMINOPHEN 5; 325 MG/1; MG/1
2 TABLET ORAL ONCE
Status: COMPLETED | OUTPATIENT
Start: 2017-10-17 | End: 2017-10-17

## 2017-10-17 RX ORDER — ACETAMINOPHEN 325 MG/1
650 TABLET ORAL ONCE
Status: COMPLETED | OUTPATIENT
Start: 2017-10-17 | End: 2017-10-17

## 2017-10-17 RX ADMIN — HYDROCODONE BITARTRATE AND ACETAMINOPHEN 2 TABLET: 5; 325 TABLET ORAL at 18:04

## 2017-10-17 RX ADMIN — ACETAMINOPHEN 650 MG: 325 TABLET, FILM COATED ORAL at 16:24

## 2017-10-17 ASSESSMENT — ENCOUNTER SYMPTOMS: ARTHRALGIAS: 1

## 2017-10-17 NOTE — ED AVS SNAPSHOT
[facility] Emergency Department    [address] [place] [address]    Phone:  [phone]    Fax:  [phone]                                       Juana Madrigal   MRN: [id number]    Department:  [facility] Emergency Department   Date of Visit:  10/17/2017           Patient Information     Date Of Birth          1990        Your diagnoses for this visit were:     Flare of rheumatoid arthritis (H)     Effusion of left knee        You were seen by Archana Mendez MD and Willy Saravia MD.      Follow-up Information     Follow up with No Ref-Primary, Physician In 1 day.    Why:  as scheduled    Contact information:    NO REF-PRIMARY PHYSICIAN          Follow up with Maryann Mccormack.    Specialty:  Rheumatology    Contact information:    [facility]  [address]  [place]  [facility] [id number] [phone]          Discharge Instructions         What is Arthritis?  Arthritis is a disease that affects the joints (the parts where bones meet and move). It can affect any joint in your body. There are many types of arthritis, including osteoarthritis and rheumatoid arthrtitis. If your symptoms are mild, medications may be enough to reduce pain and swelling. For more severe arthritis, surgery may be needed to improve the condition of the joint or replace the joint entirely.                  What causes arthritis?  Cartilage is a smooth substance that protects the ends of your bones and provides cushioning. When you have arthritis, this cartilage breaks down and can no longer protect your bones. The bones rub against each other, causing pain and swelling. Over time, bone spurs (small pieces of rough or splintered bone) may develop, and the joint's range of motion can become limited.  Symptoms  Some of the more common symptoms of arthritis include:    Joint pain and stiffness. Pain and stiffness get worse with long periods of  rest or using a joint too long or too hard.    Joints that have lost normal shape and motion.    Tender, inflamed joints. They may look red and feel warm.    Grinding or popping noise with joint movement.     Feeling tired all the time.  Reducing symptoms  Following a healthy lifestyle by losing weight and exercising can help reduce symptoms of osteoarthritis. Medicines can be very helpful for arthritis.     Date Last Reviewed: 9/10/2015    [id number] [email or web address]. [address] [place] [address], [place] [address]. All rights reserved. This information is not intended as a substitute for professional medical care. Always follow your healthcare professional's instructions.          24 Hour Appointment Hotline       To make an appointment at any [facility], call [phone] ([phone]). If you don't have a family doctor or clinic, we will help you find one. [place] clinics are conveniently located to serve the needs of you and your family.             Review of your medicines      START taking        Dose / Directions Last dose taken    HYDROcodone-acetaminophen 5-325 MG per tablet   Commonly known as:  NORCO   Dose:  1-2 tablet   Quantity:  8 tablet        Take 1-2 tablets by mouth every 4 hours as needed for moderate to severe pain   Refills:  0          Our records show that you are taking the medicines listed below. If these are incorrect, please call your family doctor or clinic.        Dose / Directions Last dose taken    acetaminophen 500 MG tablet   Commonly known as:  ACETAMINOPHEN EXTRA STRENGTH   Dose:  1000 mg   Quantity:  30 tablet        Take 2 tablets (1,000 mg) by mouth every 8 hours as needed for fever or pain   Refills:  0        * albuterol 108 (90 BASE) MCG/ACT Inhaler   Commonly known as:  PROAIR HFA/PROVENTIL HFA/VENTOLIN HFA   Dose:  2 puff   Quantity:  1 Inhaler        Inhale 2 puffs into the lungs every 4 hours as needed for shortness of breath / dyspnea or wheezing    Refills:  1        * albuterol 108 (90 BASE) MCG/ACT Inhaler   Commonly known as:  PROAIR HFA   Dose:  2 puff   Quantity:  1 Inhaler        Inhale 2 puffs into the lungs every 4 hours as needed for shortness of breath / dyspnea   Refills:  0        fluticasone 110 MCG/ACT Inhaler   Commonly known as:  FLOVENT HFA   Dose:  2 puff   Quantity:  1 Inhaler        Inhale 2 puffs into the lungs 2 times daily   Refills:  0        METHOTREXATE PO        Take by mouth once a week   Refills:  0        * Notice:  This list has 2 medication(s) that are the same as other medications prescribed for you. Read the directions carefully, and ask your doctor or other care provider to review them with you.            Prescriptions were sent or printed at these locations (1 Prescription)                   Other Prescriptions                Printed at Department/Unit printer (1 of 1)         HYDROcodone-acetaminophen (NORCO) 5-325 MG per tablet                Procedures and tests performed during your visit     Procedure/Test Number of Times Performed    Basic metabolic panel (BMP) 1    CBC (platelets, no diff) 1    Cell count with differential fluid 1    Crystal ID joint fluid 1    Fluid Culture Aerobic Bacterial 2    Glucose fluid 1    Gram stain 2    Knee XR, 3 views, left 1    Protein fluid 1      Orders Needing Specimen Collection     None      Pending Results     Date and Time Order Name Status Description    10/17/2017 1841 Fluid Culture Aerobic Bacterial In process     10/17/2017 1841 Gram stain In process     10/17/2017 1841 Crystal ID joint fluid In process             Pending Culture Results     Date and Time Order Name Status Description    10/17/2017 1841 Fluid Culture Aerobic Bacterial In process     10/17/2017 1841 Gram stain In process     10/17/2017 1841 Crystal ID joint fluid In process             Pending Results Instructions     If you had any lab results that were not finalized at the time of your Discharge, you can  call the ED Lab Result RN at [phone]. You will be contacted by this team for any positive Lab results or changes in treatment. The nurses are available 7 days a week from 10A to 6:30P.  You can leave a message 24 hours per day and they will return your call.        Test Results From Your Hospital Stay        10/17/2017  7:00 PM      Narrative     KNEE LEFT THREE VIEWS  10/17/2017 4:45 PM     HISTORY: Pain        Impression     IMPRESSION: Left knee joint effusion. Patellar lateral subluxation  which could be related to the joint effusion. Medial/lateral  compartment joint space widths appear within normal limits.    PREETI HO MD         10/17/2017  7:11 PM         10/17/2017  7:53 PM      Component Results     Component Value Ref Range & Units Status    Body Fluid Analysis Source Synovial fluid  Final    Left Knee    % Neutrophils Fluid 52 % Final    % Lymphocytes Fluid 37 % Final    % Mono/Macro Fluid 11 % Final    Color Fluid Yellow  Final    Appearance Fluid Cloudy  Final    WBC Fluid 9686 /uL Final    No reference ranges have been established.  This result should be interpreted   in the context of the patient's clinical condition and compared to   simultaneous measurement in the patient's blood.  Refer to Lab Guide for   specific interpretive guidelines.           10/17/2017  7:51 PM      Component Results     Component Value Ref Range & Units Status    Glucose Fluid Source Synovial fluid  Final    Left Knee    Glucose Fluid 2 mg/dL Final    No reference ranges have been established.  This result should be interpreted   in the context of the patient's clinical condition and compared to   simultaneous measurement in the patient's blood.  Refer to Lab Guide for   specific interpretive guidelines.           10/17/2017  7:51 PM      Component Results     Component Value Ref Range & Units Status    Protein Total Fluid Source Synovial fluid  Final    Left Knee    Protein Total Fluid 3.2 g/dL Final    No  reference ranges have been established.  This result should be interpreted   in the context of the patient's clinical condition and compared to   simultaneous measurement in the patient's blood.  Refer to Lab Guide for   specific interpretive guidelines.           10/17/2017  7:40 PM         10/17/2017  7:40 PM         10/17/2017  7:48 PM      Component Results     Component Value Ref Range & Units Status    Sodium 140 133 - 144 mmol/L Final    Potassium 3.3 (L) 3.4 - 5.3 mmol/L Final    Chloride 105 94 - 109 mmol/L Final    Carbon Dioxide 28 20 - 32 mmol/L Final    Anion Gap 7 3 - 14 mmol/L Final    Glucose 90 70 - 99 mg/dL Final    Urea Nitrogen 6 (L) 7 - 30 mg/dL Final    Creatinine 0.74 0.52 - 1.04 mg/dL Final    GFR Estimate >90 >60 mL/min/1.7m2 Final    Non  GFR Calc    GFR Estimate If Black >90 >60 mL/min/1.7m2 Final    African American GFR Calc    Calcium 8.1 (L) 8.5 - 10.1 mg/dL Final         10/17/2017  7:28 PM      Component Results     Component Value Ref Range & Units Status    WBC 5.1 4.0 - 11.0 10e9/L Final    RBC Count 3.99 3.8 - 5.2 10e12/L Final    Hemoglobin 10.3 (L) 11.7 - 15.7 g/dL Final    Hematocrit 33.5 (L) 35.0 - 47.0 % Final    MCV 84 78 - 100 fl Final    MCH 25.8 (L) 26.5 - 33.0 pg Final    MCHC 30.7 (L) 31.5 - 36.5 g/dL Final    RDW 15.3 (H) 10.0 - 15.0 % Final    Platelet Count 265 150 - 450 10e9/L Final         10/17/2017  7:42 PM      Component Results     Component    Specimen Description    Synovial fluid Left Knee    Gram Stain    Canceled, Test credited  Duplicate request                  Clinical Quality Measure: Blood Pressure Screening     Your blood pressure was checked while you were in the emergency department today. The last reading we obtained was  BP: 105/71 . Please read the guidelines below about what these numbers mean and what you should do about them.  If your systolic blood pressure (the top number) is less than 120 and your diastolic blood pressure  "(the bottom number) is less than 80, then your blood pressure is normal. There is nothing more that you need to do about it.  If your systolic blood pressure (the top number) is 120-139 or your diastolic blood pressure (the bottom number) is 80-89, your blood pressure may be higher than it should be. You should have your blood pressure rechecked within a year by a primary care provider.  If your systolic blood pressure (the top number) is 140 or greater or your diastolic blood pressure (the bottom number) is 90 or greater, you may have high blood pressure. High blood pressure is treatable, but if left untreated over time it can put you at risk for heart attack, stroke, or kidney failure. You should have your blood pressure rechecked by a primary care provider within the next 4 weeks.  If your provider in the emergency department today gave you specific instructions to follow-up with your doctor or provider even sooner than that, you should follow that instruction and not wait for up to 4 weeks for your follow-up visit.        Thank you for choosing [facility]       Thank you for choosing [facility] for your care. Our goal is always to provide you with excellent care. Hearing back from our patients is one way we can continue to improve our services. Please take a few minutes to complete the written survey that you may receive in the mail after you visit with us. Thank you!        [employer] Information     [employer] lets you send messages to your doctor, view your test results, renew your prescriptions, schedule appointments and more. To sign up, go to [email or web address] . Click on \"Log in\" on the left side of the screen, which will take you to the Welcome page. Then click on \"Sign up Now\" on the right side of the page.     You will be asked to enter the access code listed below, as well as some personal information. Please follow the directions to create your username and password.     Your access code is: " [email or web address]  Expires: 2017  9:05 PM     Your access code will  in 90 days. If you need help or a new code, please call your [place] clinic or [phone].        Care EveryWhere ID     This is your Care EveryWhere ID. This could be used by other organizations to access your [facility] medical records  [id number]        Equal Access to Services     [facility] Hospital[facility]EL[facility] : Hadii elsie bradfordo [place], waaxda luqadaha, qaybta kaalmada adenewtonyada, homero cevallos . So [facility] [phone].    ATENCIÓN: Si habla español, tiene a lawrence disposición servicios gratuitos de asistencia lingüística. Llame al [phone].    We comply with applicable federal civil rights laws and [place] laws. We do not discriminate on the basis of race, color, national origin, age, disability, sex, sexual orientation, or gender identity.            After Visit Summary       This is your record. Keep this with you and show to your community pharmacist(s) and doctor(s) at your next visit.

## 2017-10-17 NOTE — ED AVS SNAPSHOT
[facility] Emergency Department    [address]    [facility] [address]    Phone:  [phone]    Fax:  [phone]                                       Juana Madrigal   MRN: [id number]    Department:  [facility] Emergency Department   Date of Visit:  10/17/2017           After Visit Summary Signature Page     I have received my discharge instructions, and my questions have been answered. I have discussed any challenges I see with this plan with the nurse or doctor.    ..........................................................................................................................................  Patient/Patient Representative Signature      ..........................................................................................................................................  Patient Representative Print Name and Relationship to Patient    ..................................................               ................................................  Date                                            Time    ..........................................................................................................................................  Reviewed by Signature/Title    ...................................................              ..............................................  Date                                                            Time

## 2017-10-17 NOTE — ED PROVIDER NOTES
History     Chief Complaint:  Knee pain    HPI   Juana Chago Madrigal is a 27 year old female with a medical history of rheumatoid arthritis who presents with knee pain. The patient reports that she was given prednisone for her arthritis, and then she stopped taking it and since then, she has been experiencing some left knee pain for 5 days. She is also experiencing some pain down her leg to her ankle. She notes that she has had similar knee pain, but it has usually gone away.She states that the pain is more severe than usual.  She feels more pain on exertion and when she's sleeping at night. She notes taking 3 tablets of ibuprofen 3 times a day, but she still feels pain. She states that hydrocodone has previously helped alleviate her pain. Patient denies change in activity, trauma or injury to the knee. Her primary care physician is from [facility]. She also states going to a rheumatologist for her rheumatoid arthritis, and sees Dr. Geronimo.     Allergies:  No known drug allergies    Medications:    Flovent HFA  Albuterol  Proair/HFA/Proventil HFA/Ventolin HFA  Methotrexate    Past Medical History:    GERD  Rheumatoid arthritis  Uncomplicated asthma    Past Surgical History:    The patient does not have any past pertinent medical history.    Family History:    History reviewed. No pertinent family history.     Social History:  Smoking status: None  Alcohol use: None  Marital Status:  Single [1]     Review of Systems   Musculoskeletal: Positive for arthralgias.     Physical Exam   Patient Vitals for the past 24 hrs:   BP Temp Temp src Pulse Resp SpO2   10/17/17 1830 105/71 - - 75 18 100 %   10/17/17 1619 111/75 98.1  F (36.7  C) Temporal 84 18 100 %     Physical Exam    Nursing note and vitals reviewed.    Constitutional: Pleasant and well groomed.          HENT:    Mouth/Throat: Oropharynx is without swelling or erythema. Oral mucosa moist.    Eyes: Conjunctivae are normal. No scleral icterus.     Neck: Neck supple.   Cardiovascular: Normal rate, regular rhythm and intact distal pulses.    Pulmonary/Chest: Effort normal and breath sounds normal.   Abdominal: Soft.  No distension. There is no tenderness.   Musculoskeletal: Left Knee. No erythema. There is effusion. Mild warmth. Severe pain with attempts at range of motion.   Neurological:Alert. Coordination normal.   Skin: Skin is warm and dry. NO rash. See MSK.   Psychiatric: Normal mood and affect.     Emergency Department Course   Imaging:  Radiographic findings were communicated with the patient who voiced understanding of the findings.  Knee XR, 3 views, left  Left knee joint effusion. Patellar lateral subluxation  which could be related to the joint effusion. Medial/lateral  compartment joint space widths appear within normal limits.  As read by Radiology.    Laboratory:  BMP: Pending.   CBC: HGB 10. (L) WNL (WBC 5.1, )   Fluid culture: Pending.  Gram stain: Pending.  Protein fluid: Pending.  Glucose fluid: Pending.  Cell count with differential fluid: Pending.  Crystal ID joint fluid: Pending.     Procedures:  PROCEDURE NOTE:    PROCEDURE:  Aspiration of the left knee joint.    INDICATION:  Swelling and pain of the knee joint.    PHYSICIAN:  Vanessa Lindquist MD  DESCRIPTION OF PROCEDURE:    Informed consent. Site was correctly identified. Anesthesia was obtained with 2cc of 1% lidocaine with epinephrine, local infiltration.  Using a 16 gauge needle in standard fashion the Left knee joint was entered and straw colored drainage noted in syringe.  Drainage was  sent to lab for culture, cell count, and crystals.  Patient tolerated the procedure well, no complications.      Interventions:  1624: Tylenol 650 mg oral  1804: Norco 2 tablets oral    Emergency Department Course:  Past medical records, nursing notes, and vitals reviewed.  1725: I performed an exam of the patient and obtained history, as documented above.   The patient was sent for a  knee x-ray while in the emergency department, findings above.  1859: I performed a joint aspiration per the above procedure note.     Impression & Plan    CMS Diagnoses: None    Medical Decision Making:  Juana Madrigal is a 27 year old female who presents for evaluation of left knee swelling and pain in the setting of being off medications for her Rheumatoid Arthritis.   There is no history of trauma. The differential diagnosis included but was not limited to inflammatory arthritis, crystal arthropathy, septic arthritis, occult trauma.     As she had only one involved joint, that she reports pain more severe than usual and larger effusion than usual, an arthrocentesis was performed given risks/benefits.  The results are pending.  Xrays showed effusion and subluxation of patella which is mobile on exam and likely related to effusion and possible muscle imbalance.    She has an appointment tomorrow to establish PMD. She has not had a PMD and has been seen in multiple locations and received narcotics from 7 different doctors this year. We discussed my concerns about this issue and the importance of treating her arthritis and having consistent care from one provider. She also has a Rheumatologist (Dr. Mccormack )appointment in 1 week.     My partner has graciously agreed to follow up on synovial fluid gram stain and cell count which was inadvertently sent to the [facility] causing delay. Her Rheumatologist will be consulted as if results are equivocal. Please refer to addendum for final diagnosis and disposition.     Preliminary Diagnosis:     ICD-10-CM    1. Flare of rheumatoid arthritis (H) M06.9 Crystal ID joint fluid     Cell count with differential fluid     Glucose fluid     Protein fluid     Basic metabolic panel (BMP)     CBC (platelets, no diff)   2. Effusion of left knee M25.462        Plan:  Refer to Addendum for final diagnosis and disposition.     Preliminary Diagnosis:    ICD-10-CM   1. Flare of  rheumatoid arthritis (H) M06.9   2. Effusion of left knee M25.462     Disposition:  Refer to addendum.     Discharge Medications:  New Prescriptions    HYDROCODONE-ACETAMINOPHEN (NORCO) 5-325 MG PER TABLET    Take 1-2 tablets by mouth every 4 hours as needed for moderate to severe pain     Karen Alvarado  10/17/2017   [facility] EMERGENCY DEPARTMENT  I, Karen Alvarado, am serving as a scribe at 5:25 PM on 10/17/2017 to document services personally performed by Archana Mendez MD based on my observations and the provider's statements to me.          Archana Mendez MD  11/03/17 4996

## 2017-10-18 LAB
GRAM STN SPEC: NORMAL
SPECIMEN SOURCE: NORMAL

## 2017-10-18 NOTE — ED PROVIDER NOTES
Sign Out Note    Pt accepted in sign out from: Dr. Mendez    Briefly pt presented to the ED for: Left knee effusion.  Please see Dr. Mendez's note regarding tonight's ED visit and initial HPI.    Plan at time of sign out: f/u fluid studies from left knee and final disposition, likely discharge  From Dr. Mendez's Note:    Juana Madrigal is a 27 year old female who presents for evaluation of left knee swelling and pain in the setting of being off medications for her Rheumatoid Arthritis.   There is no history of trauma. The differential diagnosis included but was not limited to inflammatory arthritis, crystal arthropathy, septic arthritis, occult trauma.      As she had only one involved joint, that she reports pain more severe than usual and larger effusion than usual, an arthrocentesis was performed given risks/benefits.  The results are pending.  Xrays showed effusion and subluxation of patella which is mobile on exam and likely related to effusion and possible muscle imbalance.    She has an appointment tomorrow to establish PMD. She has not had a PMD and has been seen in multiple locations and received narcotics from 7 different doctors this year. We discussed my concerns about this issue and the importance of treating her arthritis and having consistent care from one provider. She also has a Rheumatologist (Dr. Mccormack )appointment in 1 week.     Care of patient during my shift: No acute changes to care while under my care.        Labs Ordered and Resulted from Time of ED Arrival Up to the Time of Departure from the ED   BASIC METABOLIC PANEL - Abnormal; Notable for the following:        Result Value    Potassium 3.3 (*)     Urea Nitrogen 6 (*)     Calcium 8.1 (*)     All other components within normal limits   CBC WITH PLATELETS - Abnormal; Notable for the following:     Hemoglobin 10.3 (*)     Hematocrit 33.5 (*)     MCH 25.8 (*)     MCHC 30.7 (*)     RDW 15.3 (*)     All other components within  normal limits   CRYSTAL ID SYNOVIAL FLUID   CELL COUNT WITH DIFFERENTIAL FLUID   GLUCOSE FLUID   PROTEIN FLUID   GRAM STAIN     Plan for patient at this time:     Juana Madrigal is a 27 year old female with the history of knee effusion in the setting of known rheumatoid arthritis. The patient has been unreliable to follow up and has self-discontinued methotrexate in the past. She has been taking ibuprofen for pain control. Improved with Norco today under Dr. Mendez's care. The patient has a close outpatient follow up tomorrow with her primary care physician and agreeable to continued ibuprofen and Norco for the pain. The patient's effusion and synovial fluid analysis came back consistent with an inflammatory process with WBC approximately 9686. The patient had a preliminary gram stain which was negative. The patient has no other concerning findings for possible septic arthritis at this time. Dr. Mendez had a low concern for infectious etiologies of the patient's knee effusion. This is most likely consistent with an inflammatory process such as a probable rheumatoid arthritis flare. The patient has follow up with rheumatology within one week and should continue to keep that appointment for discussion of whether to continue methotrexate and/or additional therapies at that time. She will establish care with her primary care physician tomorrow, placed in a knee immobilizer and able to ambulate well with crutches here. The patient was discharged home after all return precautions and follow up instructions were understood.      Willy Saravia MD  10/18/17 0131

## 2017-10-18 NOTE — DISCHARGE INSTRUCTIONS
What is Arthritis?  Arthritis is a disease that affects the joints (the parts where bones meet and move). It can affect any joint in your body. There are many types of arthritis, including osteoarthritis and rheumatoid arthrtitis. If your symptoms are mild, medications may be enough to reduce pain and swelling. For more severe arthritis, surgery may be needed to improve the condition of the joint or replace the joint entirely.                  What causes arthritis?  Cartilage is a smooth substance that protects the ends of your bones and provides cushioning. When you have arthritis, this cartilage breaks down and can no longer protect your bones. The bones rub against each other, causing pain and swelling. Over time, bone spurs (small pieces of rough or splintered bone) may develop, and the joint's range of motion can become limited.  Symptoms  Some of the more common symptoms of arthritis include:    Joint pain and stiffness. Pain and stiffness get worse with long periods of rest or using a joint too long or too hard.    Joints that have lost normal shape and motion.    Tender, inflamed joints. They may look red and feel warm.    Grinding or popping noise with joint movement.     Feeling tired all the time.  Reducing symptoms  Following a healthy lifestyle by losing weight and exercising can help reduce symptoms of osteoarthritis. Medicines can be very helpful for arthritis.     Date Last Reviewed: 9/10/2015    9800-7574 [employer]. [address] [place] [address], [place] [address]. All rights reserved. This information is not intended as a substitute for professional medical care. Always follow your healthcare professional's instructions.

## 2017-10-22 LAB
BACTERIA SPEC CULT: NO GROWTH
SPECIMEN SOURCE: NORMAL

## 2023-05-15 ENCOUNTER — TELEPHONE (OUTPATIENT)
Dept: BEHAVIORAL HEALTH | Facility: CLINIC | Age: 33
End: 2023-05-15

## 2023-05-15 NOTE — TELEPHONE ENCOUNTER
Pt is a(n) adult (18+ out of HS) Seeking as eval for Adult CLINT Assessment for evaluation and recommendations. and is interested in Adult CLINT/Co-Occurring Program (Either In-Person or Virtual).  Appointment scheduled by:  Patient.  (If patient is self-pay, we much complete a Cost Estimate and save it to the pt chart)  Caller name:  Juana Madrigal    Caller phone #: [phone]  If in person, please state reason why:  Only option  Brief reason for appt:  Clint eval    Cost estimate Did not get completed.  Contact information verified/updated: Yes

## 2023-05-30 ENCOUNTER — HOSPITAL ENCOUNTER (EMERGENCY)
Facility: CLINIC | Age: 33
Discharge: HOME | End: 2023-05-31
Attending: EMERGENCY MEDICINE | Admitting: EMERGENCY MEDICINE
Payer: COMMERCIAL

## 2023-05-30 DIAGNOSIS — T74.21XA SEXUAL ASSAULT OF ADULT, INITIAL ENCOUNTER: ICD-10-CM

## 2023-05-30 PROCEDURE — 99284 EMERGENCY DEPT VISIT MOD MDM: CPT | Performed by: EMERGENCY MEDICINE

## 2023-05-30 PROCEDURE — 250N000013 HC RX MED GY IP 250 OP 250 PS 637: Performed by: EMERGENCY MEDICINE

## 2023-05-30 PROCEDURE — 250N000011 HC RX IP 250 OP 636: Performed by: EMERGENCY MEDICINE

## 2023-05-30 PROCEDURE — 96372 THER/PROPH/DIAG INJ SC/IM: CPT | Performed by: EMERGENCY MEDICINE

## 2023-05-30 RX ORDER — ONDANSETRON 4 MG/1
4 TABLET, ORALLY DISINTEGRATING ORAL ONCE
Status: COMPLETED | OUTPATIENT
Start: 2023-05-30 | End: 2023-05-30

## 2023-05-30 RX ORDER — FLUOXETINE 20 MG/1
20 TABLET, FILM COATED ORAL DAILY
COMMUNITY
End: 2024-08-13

## 2023-05-30 RX ORDER — CEFTRIAXONE SODIUM 1 G
500 VIAL (EA) INJECTION ONCE
Status: COMPLETED | OUTPATIENT
Start: 2023-05-30 | End: 2023-05-30

## 2023-05-30 RX ORDER — METRONIDAZOLE 500 MG/1
2000 TABLET ORAL ONCE
Status: COMPLETED | OUTPATIENT
Start: 2023-05-30 | End: 2023-05-30

## 2023-05-30 RX ORDER — AZITHROMYCIN 250 MG/1
1000 TABLET, FILM COATED ORAL ONCE
Status: COMPLETED | OUTPATIENT
Start: 2023-05-30 | End: 2023-05-30

## 2023-05-30 RX ORDER — LEVONORGESTREL 1.5 MG/1
1.5 TABLET ORAL ONCE
Status: COMPLETED | OUTPATIENT
Start: 2023-05-30 | End: 2023-05-30

## 2023-05-30 RX ADMIN — AZITHROMYCIN MONOHYDRATE 1000 MG: 250 TABLET ORAL at 19:39

## 2023-05-30 RX ADMIN — LEVONORGESTREL 1.5 MG: 1.5 TABLET ORAL at 19:38

## 2023-05-30 RX ADMIN — ONDANSETRON 4 MG: 4 TABLET, ORALLY DISINTEGRATING ORAL at 19:37

## 2023-05-30 RX ADMIN — CEFTRIAXONE SODIUM 500 MG: 1 INJECTION, POWDER, FOR SOLUTION INTRAMUSCULAR; INTRAVENOUS at 19:43

## 2023-05-30 RX ADMIN — METRONIDAZOLE 2000 MG: 500 TABLET ORAL at 19:39

## 2023-05-30 ASSESSMENT — ACTIVITIES OF DAILY LIVING (ADL)
ADLS_ACUITY_SCORE: 35
ADLS_ACUITY_SCORE: 33
ADLS_ACUITY_SCORE: 35
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
## 2023-05-31 VITALS
HEART RATE: 91 BPM | OXYGEN SATURATION: 100 % | RESPIRATION RATE: 16 BRPM | SYSTOLIC BLOOD PRESSURE: 107 MMHG | DIASTOLIC BLOOD PRESSURE: 70 MMHG | TEMPERATURE: 98.4 F

## 2023-05-31 ASSESSMENT — ACTIVITIES OF DAILY LIVING (ADL)
ADLS_ACUITY_SCORE: 35

## 2023-05-31 NOTE — ED NOTES
This writer called the numbers for the [facility] but no answer. Phone numbers are not set up to receive voice mail. Patient reports that she doesn't have other place to stay tonight

## 2023-05-31 NOTE — ED NOTES
Talked to Ember from the treatment center. Staff was informed that patient is going back to their treatment facility via cab
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
## 2023-05-31 NOTE — ED NOTES
SICU PLAN OF CARE NOTE    Dx: Atherosclerotic heart disease of native coronary artery with unstable angina pectoris    Shift Events: Up OOBTC all shift, x1 assist to bed. Fem A-line d/c'd with no complications. Left radial a-line positional at times. Epi weaned off. Primacor @ 0.125. Insulin gtt transitioned to AC/HS + 0200. Diet adv and cesar diabetic with 1500 ml FR. Pain management around the clock, Oxy Q4h with IV Dilaudid for BTP. IVP Lasix admin as ordered at end of shift.    Goals of Care: MAP >65, SBP <140    Neuro: AAO x4, Follows Commands, and Moves All Extremities    Vital Signs: BP (!) 150/72   Pulse 107   Temp 99.68 °F (37.6 °C)   Resp (!) 36   Ht 6' (1.829 m)   Wt 126.6 kg (279 lb 1.6 oz)   SpO2 (!) 92%   BMI 37.85 kg/m²     Respiratory: Nasal Cannula 5L    Diet: Diabetic Diet    Gtts: Insulin and Milrinone    Urine Output: Urinary Catheter 30 cc/hour (total:350 ml/shift)    Drains: Chest Tube, total output 70 cc /  shift    Cardiac: Sinus Tachy 104-120 bpm     Labs/Accuchecks: Daily labs. Accuchecks AC/HS +0200    Skin: Bilateral heel and sacral foams in place. TQ2. No new skin breakdown noted. ML with telfa and left EVH wrapped with compression wrap CDI. See assessment for details and wound care orders. Waffle mattress inflated and bed functioning properly.        Patient Verbalized understanding of discharge instructions including medication administration and recommended follow up care as noted on discharge instructions. Written discharge instructions given, denies any further questions.

## 2023-06-05 ENCOUNTER — HOSPITAL ENCOUNTER (EMERGENCY)
Facility: CLINIC | Age: 33
Discharge: HOME | End: 2023-06-05
Attending: EMERGENCY MEDICINE | Admitting: EMERGENCY MEDICINE
Payer: COMMERCIAL

## 2023-06-05 VITALS
DIASTOLIC BLOOD PRESSURE: 60 MMHG | BODY MASS INDEX: 23.03 KG/M2 | SYSTOLIC BLOOD PRESSURE: 101 MMHG | RESPIRATION RATE: 18 BRPM | TEMPERATURE: 98.1 F | HEIGHT: 72 IN | HEART RATE: 66 BPM | OXYGEN SATURATION: 100 % | WEIGHT: 170 LBS

## 2023-06-05 DIAGNOSIS — K29.70 GASTRITIS WITHOUT BLEEDING, UNSPECIFIED CHRONICITY, UNSPECIFIED GASTRITIS TYPE: ICD-10-CM

## 2023-06-05 LAB
ALBUMIN SERPL BCG-MCNC: 4.2 G/DL (ref 3.5–5.2)
ALBUMIN UR-MCNC: NEGATIVE MG/DL
ALP SERPL-CCNC: 75 U/L (ref 35–104)
ALT SERPL W P-5'-P-CCNC: 20 U/L (ref 10–35)
ANION GAP SERPL CALCULATED.3IONS-SCNC: 11 MMOL/L (ref 7–15)
APPEARANCE UR: CLEAR
AST SERPL W P-5'-P-CCNC: 25 U/L (ref 10–35)
ATRIAL RATE - MUSE: 67 BPM
BASOPHILS # BLD AUTO: 0 10E3/UL (ref 0–0.2)
BASOPHILS NFR BLD AUTO: 0 %
BILIRUB SERPL-MCNC: 0.2 MG/DL
BILIRUB UR QL STRIP: NEGATIVE
BUN SERPL-MCNC: 3.7 MG/DL (ref 6–20)
CALCIUM SERPL-MCNC: 8.9 MG/DL (ref 8.6–10)
CHLORIDE SERPL-SCNC: 104 MMOL/L (ref 98–107)
COLOR UR AUTO: NORMAL
CREAT SERPL-MCNC: 0.57 MG/DL (ref 0.51–0.95)
DEPRECATED HCO3 PLAS-SCNC: 25 MMOL/L (ref 22–29)
DIASTOLIC BLOOD PRESSURE - MUSE: NORMAL MMHG
EOSINOPHIL # BLD AUTO: 0 10E3/UL (ref 0–0.7)
EOSINOPHIL NFR BLD AUTO: 1 %
ERYTHROCYTE [DISTWIDTH] IN BLOOD BY AUTOMATED COUNT: 14.8 % (ref 10–15)
GFR SERPL CREATININE-BSD FRML MDRD: >90 ML/MIN/1.73M2
GLUCOSE SERPL-MCNC: 108 MG/DL (ref 70–99)
GLUCOSE UR STRIP-MCNC: NEGATIVE MG/DL
HCG UR QL: NEGATIVE
HCT VFR BLD AUTO: 38.5 % (ref 35–47)
HGB BLD-MCNC: 12.2 G/DL (ref 11.7–15.7)
HGB UR QL STRIP: NEGATIVE
IMM GRANULOCYTES # BLD: 0 10E3/UL
IMM GRANULOCYTES NFR BLD: 0 %
INTERPRETATION ECG - MUSE: NORMAL
KETONES UR STRIP-MCNC: NEGATIVE MG/DL
LEUKOCYTE ESTERASE UR QL STRIP: NEGATIVE
LIPASE SERPL-CCNC: 16 U/L (ref 13–60)
LYMPHOCYTES # BLD AUTO: 2.5 10E3/UL (ref 0.8–5.3)
LYMPHOCYTES NFR BLD AUTO: 60 %
MCH RBC QN AUTO: 25.6 PG (ref 26.5–33)
MCHC RBC AUTO-ENTMCNC: 31.7 G/DL (ref 31.5–36.5)
MCV RBC AUTO: 81 FL (ref 78–100)
MONOCYTES # BLD AUTO: 0.3 10E3/UL (ref 0–1.3)
MONOCYTES NFR BLD AUTO: 8 %
NEUTROPHILS # BLD AUTO: 1.3 10E3/UL (ref 1.6–8.3)
NEUTROPHILS NFR BLD AUTO: 31 %
NITRATE UR QL: NEGATIVE
NRBC # BLD AUTO: 0 10E3/UL
NRBC BLD AUTO-RTO: 0 /100
P AXIS - MUSE: 66 DEGREES
PH UR STRIP: 6 [PH] (ref 5–7)
PLATELET # BLD AUTO: 328 10E3/UL (ref 150–450)
POTASSIUM SERPL-SCNC: 3.3 MMOL/L (ref 3.4–5.3)
PR INTERVAL - MUSE: 148 MS
PROT SERPL-MCNC: 7.8 G/DL (ref 6.4–8.3)
QRS DURATION - MUSE: 82 MS
QT - MUSE: 468 MS
QTC - MUSE: 494 MS
R AXIS - MUSE: 45 DEGREES
RBC # BLD AUTO: 4.77 10E6/UL (ref 3.8–5.2)
RBC URINE: 0 /HPF
SODIUM SERPL-SCNC: 140 MMOL/L (ref 136–145)
SP GR UR STRIP: 1 (ref 1–1.03)
SQUAMOUS EPITHELIAL: <1 /HPF
SYSTOLIC BLOOD PRESSURE - MUSE: NORMAL MMHG
T AXIS - MUSE: 49 DEGREES
TROPONIN T SERPL HS-MCNC: 18 NG/L
UROBILINOGEN UR STRIP-MCNC: NORMAL MG/DL
VENTRICULAR RATE- MUSE: 67 BPM
WBC # BLD AUTO: 4.1 10E3/UL (ref 4–11)
WBC URINE: <1 /HPF

## 2023-06-05 PROCEDURE — 93005 ELECTROCARDIOGRAM TRACING: CPT | Performed by: EMERGENCY MEDICINE

## 2023-06-05 PROCEDURE — 84484 ASSAY OF TROPONIN QUANT: CPT | Performed by: EMERGENCY MEDICINE

## 2023-06-05 PROCEDURE — 81025 URINE PREGNANCY TEST: CPT | Performed by: EMERGENCY MEDICINE

## 2023-06-05 PROCEDURE — 258N000003 HC RX IP 258 OP 636: Performed by: EMERGENCY MEDICINE

## 2023-06-05 PROCEDURE — 83690 ASSAY OF LIPASE: CPT | Performed by: EMERGENCY MEDICINE

## 2023-06-05 PROCEDURE — 36415 COLL VENOUS BLD VENIPUNCTURE: CPT | Performed by: EMERGENCY MEDICINE

## 2023-06-05 PROCEDURE — 99284 EMERGENCY DEPT VISIT MOD MDM: CPT | Mod: 25 | Performed by: EMERGENCY MEDICINE

## 2023-06-05 PROCEDURE — 85041 AUTOMATED RBC COUNT: CPT | Performed by: EMERGENCY MEDICINE

## 2023-06-05 PROCEDURE — 96375 TX/PRO/DX INJ NEW DRUG ADDON: CPT | Performed by: EMERGENCY MEDICINE

## 2023-06-05 PROCEDURE — 250N000011 HC RX IP 250 OP 636: Performed by: EMERGENCY MEDICINE

## 2023-06-05 PROCEDURE — 81001 URINALYSIS AUTO W/SCOPE: CPT | Performed by: EMERGENCY MEDICINE

## 2023-06-05 PROCEDURE — 250N000013 HC RX MED GY IP 250 OP 250 PS 637: Performed by: EMERGENCY MEDICINE

## 2023-06-05 PROCEDURE — 93010 ELECTROCARDIOGRAM REPORT: CPT | Performed by: EMERGENCY MEDICINE

## 2023-06-05 PROCEDURE — 80053 COMPREHEN METABOLIC PANEL: CPT | Performed by: EMERGENCY MEDICINE

## 2023-06-05 PROCEDURE — 96374 THER/PROPH/DIAG INJ IV PUSH: CPT | Performed by: EMERGENCY MEDICINE

## 2023-06-05 PROCEDURE — 96361 HYDRATE IV INFUSION ADD-ON: CPT | Performed by: EMERGENCY MEDICINE

## 2023-06-05 PROCEDURE — 250N000009 HC RX 250: Performed by: EMERGENCY MEDICINE

## 2023-06-05 RX ORDER — SODIUM CHLORIDE 9 MG/ML
INJECTION, SOLUTION INTRAVENOUS CONTINUOUS
Status: DISCONTINUED | OUTPATIENT
Start: 2023-06-05 | End: 2023-06-05 | Stop reason: HOSPADM

## 2023-06-05 RX ORDER — MAGNESIUM HYDROXIDE/ALUMINUM HYDROXICE/SIMETHICONE 120; 1200; 1200 MG/30ML; MG/30ML; MG/30ML
15 SUSPENSION ORAL ONCE
Status: COMPLETED | OUTPATIENT
Start: 2023-06-05 | End: 2023-06-05

## 2023-06-05 RX ORDER — SODIUM CHLORIDE 9 MG/ML
INJECTION, SOLUTION INTRAVENOUS
Status: DISCONTINUED
Start: 2023-06-05 | End: 2023-06-05 | Stop reason: HOSPADM

## 2023-06-05 RX ORDER — KETOROLAC TROMETHAMINE 15 MG/ML
10 INJECTION, SOLUTION INTRAMUSCULAR; INTRAVENOUS ONCE
Status: COMPLETED | OUTPATIENT
Start: 2023-06-05 | End: 2023-06-05

## 2023-06-05 RX ORDER — NAPROXEN 500 MG/1
500 TABLET ORAL 2 TIMES DAILY WITH MEALS
Qty: 30 TABLET | Refills: 0 | Status: SHIPPED | OUTPATIENT
Start: 2023-06-05 | End: 2023-06-20

## 2023-06-05 RX ORDER — OMEPRAZOLE 40 MG/1
40 CAPSULE, DELAYED RELEASE ORAL DAILY
Qty: 30 CAPSULE | Refills: 0 | Status: SHIPPED | OUTPATIENT
Start: 2023-06-05 | End: 2023-07-05

## 2023-06-05 RX ORDER — ONDANSETRON 2 MG/ML
4 INJECTION INTRAMUSCULAR; INTRAVENOUS EVERY 30 MIN PRN
Status: DISCONTINUED | OUTPATIENT
Start: 2023-06-05 | End: 2023-06-05 | Stop reason: HOSPADM

## 2023-06-05 RX ORDER — LIDOCAINE HYDROCHLORIDE 20 MG/ML
10 SOLUTION OROPHARYNGEAL ONCE
Status: COMPLETED | OUTPATIENT
Start: 2023-06-05 | End: 2023-06-05

## 2023-06-05 RX ADMIN — ONDANSETRON 4 MG: 2 INJECTION INTRAMUSCULAR; INTRAVENOUS at 10:59

## 2023-06-05 RX ADMIN — ALUMINUM HYDROXIDE, MAGNESIUM HYDROXIDE, AND SIMETHICONE 15 ML: 200; 200; 20 SUSPENSION ORAL at 10:21

## 2023-06-05 RX ADMIN — SODIUM CHLORIDE: 9 INJECTION, SOLUTION INTRAVENOUS at 10:58

## 2023-06-05 RX ADMIN — LIDOCAINE HYDROCHLORIDE 10 ML: 20 SOLUTION ORAL; TOPICAL at 10:21

## 2023-06-05 RX ADMIN — SODIUM CHLORIDE 1000 ML: 9 INJECTION, SOLUTION INTRAVENOUS at 09:50

## 2023-06-05 RX ADMIN — KETOROLAC TROMETHAMINE 10 MG: 15 INJECTION, SOLUTION INTRAMUSCULAR; INTRAVENOUS at 11:47

## 2023-06-05 ASSESSMENT — ACTIVITIES OF DAILY LIVING (ADL): ADLS_ACUITY_SCORE: 35

## 2023-06-05 NOTE — ED TRIAGE NOTES
Pt presents with abdominal pain for the past two weeks. Denies current chest pain but does the chest pain occur when experiencing acid reflux. Pt says that they have seen blood in their stool. Reports pain as a 9/10 in the middle lower of their abdomen. Pt reports they are unsure if they are pregnant.     Triage Assessment     Row Name 06/05/23 0919       Triage Assessment (Adult)    Airway WDL WDL       Respiratory WDL    Respiratory WDL WDL       Skin Circulation/Temperature WDL    Skin Circulation/Temperature WDL WDL       Cardiac WDL    Cardiac WDL WDL       Peripheral/Neurovascular WDL    Peripheral Neurovascular WDL WDL       Cognitive/Neuro/Behavioral WDL    Cognitive/Neuro/Behavioral WDL WDL

## 2023-06-05 NOTE — ED PROVIDER NOTES
"    [facility] EMERGENCY DEPARTMENT ([facility])    6/05/23         History     Chief Complaint   Patient presents with     Abdominal Pain     Abdominal pain in the center lower of their abdomen for the past two weeks.     JEFF Madrigal is a 33 year old female who with PMH of arthritis presents to the ED with abdominal pain.  Patient reports that she has been having upper abdominal pain.  She also feels that she has pain in her chest and yesterday she had felt dizzy and had fallen down, so she believes there is something wrong with her heart.  Also, she reported that sometimes at night she feels \"very cold and shaky\" and whenever she feels icy she uses a blanket.  Patient was previously using antacids for her symptoms and now she does not use them anymore.  She also reported that she feels nauseous and last night she felt like vomiting.  Patient also reported that she cannot hold her urine and she has to go immediately.  Also, she has burning sensation and thinks she has a bladder infection, so she would like to be checked for that.      Past Medical History  Past Medical History:   Diagnosis Date     Arthritis      GERD (gastroesophageal reflux disease)      Rheumatoid arthritis (H)      Uncomplicated asthma      History reviewed. No pertinent surgical history.  FLUoxetine 20 MG tablet  naproxen (NAPROSYN) 500 MG tablet  omeprazole (PRILOSEC) 40 MG DR capsule  acetaminophen (ACETAMINOPHEN EXTRA STRENGTH) 500 MG tablet  albuterol (ALBUTEROL) 108 (90 BASE) MCG/ACT Inhaler  albuterol (PROAIR HFA/PROVENTIL HFA/VENTOLIN HFA) 108 (90 BASE) MCG/ACT Inhaler  fluticasone (FLOVENT HFA) 110 MCG/ACT Inhaler  HYDROcodone-acetaminophen (NORCO) 5-325 MG per tablet  METHOTREXATE PO      No Known Allergies  Family History  History reviewed. No pertinent family history.  Social History   Social History     Tobacco Use     Smoking status: Some Days     Smokeless tobacco: Never     Tobacco comments:     Occasional " Hookah use (tobacco)    Substance Use Topics     Alcohol use: Not Currently     Drug use: Yes     Types: Marijuana     Comment: THC vape occasionally      Past medical history, past surgical history, medications, allergies, family history, and social history were reviewed with the patient. No additional pertinent items.      A complete review of systems was performed with pertinent positives and negatives noted in the HPI, and all other systems negative.    Physical Exam   BP: 97/61  Pulse: 71  Temp: 98.3  F (36.8  C)  Resp: 18  Height: 182.9 cm (6')  Weight: 77.1 kg (170 lb)  SpO2: 100 %  Physical Exam  Constitutional:       General: She is not in acute distress.     Appearance: She is not diaphoretic.   HENT:      Head: Normocephalic and atraumatic.      Right Ear: External ear normal.      Left Ear: External ear normal.      Nose: Nose normal.   Eyes:      Extraocular Movements: Extraocular movements intact.      Conjunctiva/sclera: Conjunctivae normal.   Cardiovascular:      Rate and Rhythm: Normal rate and regular rhythm.      Heart sounds: Normal heart sounds.   Pulmonary:      Effort: Pulmonary effort is normal. No respiratory distress.      Breath sounds: Normal breath sounds.   Abdominal:      General: There is no distension.      Palpations: Abdomen is soft.      Tenderness: There is generalized abdominal tenderness.   Musculoskeletal:         General: No swelling or deformity.      Cervical back: Normal range of motion and neck supple.   Skin:     General: Skin is warm and dry.   Neurological:      Mental Status: Mental status is at baseline.      Comments: Alert, oriented   Psychiatric:         Mood and Affect: Mood normal.         Behavior: Behavior normal.       ED Course, Procedures, & Data     9:36 AM  The patient was seen and examined by Manny Greenwood DO.  in Room ED 06.   Procedures       ED Course Selections:        EKG Interpretation:      Interpreted by Manny Greenwood DO  Time  reviewed: 1004  Symptoms at time of EKG: Chest pain, abdominal pain  Rhythm:  sinus arrhythmia  Rate: normal  Axis: normal  Ectopy: none  Conduction: normal  ST Segments/ T Waves: No ST-T wave changes  Q Waves: none  Comparison to prior: No old EKG available    Clinical Impression: abnormal EKG                     Results for orders placed or performed during the hospital encounter of 06/05/23   Comprehensive metabolic panel     Status: Abnormal   Result Value Ref Range    Sodium 140 136 - 145 mmol/L    Potassium 3.3 (L) 3.4 - 5.3 mmol/L    Chloride 104 98 - 107 mmol/L    Carbon Dioxide (CO2) 25 22 - 29 mmol/L    Anion Gap 11 7 - 15 mmol/L    Urea Nitrogen 3.7 (L) 6.0 - 20.0 mg/dL    Creatinine 0.57 0.51 - 0.95 mg/dL    Calcium 8.9 8.6 - 10.0 mg/dL    Glucose 108 (H) 70 - 99 mg/dL    Alkaline Phosphatase 75 35 - 104 U/L    AST 25 10 - 35 U/L    ALT 20 10 - 35 U/L    Protein Total 7.8 6.4 - 8.3 g/dL    Albumin 4.2 3.5 - 5.2 g/dL    Bilirubin Total 0.2 <=1.2 mg/dL    GFR Estimate >90 >60 mL/min/1.73m2   Lipase     Status: Normal   Result Value Ref Range    Lipase 16 13 - 60 U/L   HCG qualitative urine     Status: Normal   Result Value Ref Range    hCG Urine Qualitative Negative Negative   UA with Microscopic reflex to Culture     Status: Normal    Specimen: Urine, Clean Catch   Result Value Ref Range    Color Urine Light Yellow Colorless, Straw, Light Yellow, Yellow    Appearance Urine Clear Clear    Glucose Urine Negative Negative mg/dL    Bilirubin Urine Negative Negative    Ketones Urine Negative Negative mg/dL    Specific Gravity Urine 1.005 1.003 - 1.035    Blood Urine Negative Negative    pH Urine 6.0 5.0 - 7.0    Protein Albumin Urine Negative Negative mg/dL    Urobilinogen Urine Normal Normal, 2.0 mg/dL    Nitrite Urine Negative Negative    Leukocyte Esterase Urine Negative Negative    RBC Urine 0 <=2 /HPF    WBC Urine <1 <=5 /HPF    Squamous Epithelials Urine <1 <=1 /HPF    Narrative    Urine Culture not  indicated   Troponin T, High Sensitivity     Status: Abnormal   Result Value Ref Range    Troponin T, High Sensitivity 18 (H) <=14 ng/L   CBC with platelets and differential     Status: Abnormal   Result Value Ref Range    WBC Count 4.1 4.0 - 11.0 10e3/uL    RBC Count 4.77 3.80 - 5.20 10e6/uL    Hemoglobin 12.2 11.7 - 15.7 g/dL    Hematocrit 38.5 35.0 - 47.0 %    MCV 81 78 - 100 fL    MCH 25.6 (L) 26.5 - 33.0 pg    MCHC 31.7 31.5 - 36.5 g/dL    RDW 14.8 10.0 - 15.0 %    Platelet Count 328 150 - 450 10e3/uL    % Neutrophils 31 %    % Lymphocytes 60 %    % Monocytes 8 %    % Eosinophils 1 %    % Basophils 0 %    % Immature Granulocytes 0 %    NRBCs per 100 WBC 0 <1 /100    Absolute Neutrophils 1.3 (L) 1.6 - 8.3 10e3/uL    Absolute Lymphocytes 2.5 0.8 - 5.3 10e3/uL    Absolute Monocytes 0.3 0.0 - 1.3 10e3/uL    Absolute Eosinophils 0.0 0.0 - 0.7 10e3/uL    Absolute Basophils 0.0 0.0 - 0.2 10e3/uL    Absolute Immature Granulocytes 0.0 <=0.4 10e3/uL    Absolute NRBCs 0.0 10e3/uL   EKG 12-lead, tracing only     Status: None   Result Value Ref Range    Systolic Blood Pressure  mmHg    Diastolic Blood Pressure  mmHg    Ventricular Rate 67 BPM    Atrial Rate 67 BPM    OH Interval 148 ms    QRS Duration 82 ms     ms    QTc 494 ms    P Axis 66 degrees    R AXIS 45 degrees    T Axis 49 degrees    Interpretation ECG       Sinus rhythm with sinus arrhythmia  Prolonged QT  Abnormal ECG     CBC with platelets differential     Status: Abnormal    Narrative    The following orders were created for panel order CBC with platelets differential.  Procedure                               Abnormality         Status                     ---------                               -----------         ------                     CBC with platelets and d...[[id number]]  Abnormal            Final result                 Please view results for these tests on the individual orders.     Medications   0.9% sodium chloride BOLUS (0 mLs Intravenous  Stopped 6/5/23 1058)   alum & mag hydroxide-simethicone (MAALOX) suspension 15 mL (15 mLs Oral $Given 6/5/23 1021)   lidocaine (viscous) (XYLOCAINE) 2 % solution 10 mL (10 mLs Mouth/Throat $Given 6/5/23 1021)   ketorolac (TORADOL) injection 10 mg (10 mg Intravenous $Given 6/5/23 1147)     Labs Ordered and Resulted from Time of ED Arrival to Time of ED Departure   COMPREHENSIVE METABOLIC PANEL - Abnormal       Result Value    Sodium 140      Potassium 3.3 (*)     Chloride 104      Carbon Dioxide (CO2) 25      Anion Gap 11      Urea Nitrogen 3.7 (*)     Creatinine 0.57      Calcium 8.9      Glucose 108 (*)     Alkaline Phosphatase 75      AST 25      ALT 20      Protein Total 7.8      Albumin 4.2      Bilirubin Total 0.2      GFR Estimate >90     TROPONIN T, HIGH SENSITIVITY - Abnormal    Troponin T, High Sensitivity 18 (*)    CBC WITH PLATELETS AND DIFFERENTIAL - Abnormal    WBC Count 4.1      RBC Count 4.77      Hemoglobin 12.2      Hematocrit 38.5      MCV 81      MCH 25.6 (*)     MCHC 31.7      RDW 14.8      Platelet Count 328      % Neutrophils 31      % Lymphocytes 60      % Monocytes 8      % Eosinophils 1      % Basophils 0      % Immature Granulocytes 0      NRBCs per 100 WBC 0      Absolute Neutrophils 1.3 (*)     Absolute Lymphocytes 2.5      Absolute Monocytes 0.3      Absolute Eosinophils 0.0      Absolute Basophils 0.0      Absolute Immature Granulocytes 0.0      Absolute NRBCs 0.0     LIPASE - Normal    Lipase 16     HCG QUALITATIVE URINE - Normal    hCG Urine Qualitative Negative     ROUTINE UA WITH MICROSCOPIC REFLEX TO CULTURE - Normal    Color Urine Light Yellow      Appearance Urine Clear      Glucose Urine Negative      Bilirubin Urine Negative      Ketones Urine Negative      Specific Gravity Urine 1.005      Blood Urine Negative      pH Urine 6.0      Protein Albumin Urine Negative      Urobilinogen Urine Normal      Nitrite Urine Negative      Leukocyte Esterase Urine Negative      RBC Urine 0       WBC Urine <1      Squamous Epithelials Urine <1       No orders to display            Medical Decision Making  The patient's presentation is strongly suggestive of high complexity (an acute health issue posing potential threat to life or bodily function).    The patient's evaluation involved:  review of external note(s) from 3+ sources (Most recent H&P in addition to clinic and ED note)  review of 2 test result(s) ordered prior to this encounter (Most recent BMP and CBC)  EKG independently interpreted    The patient's management involved moderate risk (prescription drug management including medications given in the ED).      Assessment & Plan    33-year-old female presents to us with a chief complaint of chest abdomen and back pain.  Differential includes but not limited to gastritis, GERD, UTI, pancreatitis, pregnancy.  Labs today were unremarkable.  Patient was given a GI cocktail and Zofran which helped with symptoms.  Labs otherwise in addition to UA today were normal.  We will discharge her with medications have her follow-up with primary care.    I have reviewed the nursing notes. I have reviewed the findings, diagnosis, plan and need for follow up with the patient.    Discharge Medication List as of 6/5/2023 11:51 AM      START taking these medications    Details   naproxen (NAPROSYN) 500 MG tablet Take 1 tablet (500 mg) by mouth 2 times daily (with meals) for 15 days, Disp-30 tablet, R-0, Local Print      omeprazole (PRILOSEC) 40 MG DR capsule Take 1 capsule (40 mg) by mouth daily for 30 days, Disp-30 capsule, R-0, Local Print             Final diagnoses:   Gastritis without bleeding, unspecified chronicity, unspecified gastritis type   This part of the medical record was transcribed by VICKIE PA Medical Scribe, from a dictation done by Manny Greenwood DO.     Manny Greenwood DO.   [facility] EMERGENCY DEPARTMENT  6/5/2023     Manny Greenwood DO  06/09/23 0036

## 2023-06-26 ENCOUNTER — APPOINTMENT (OUTPATIENT)
Dept: ULTRASOUND IMAGING | Facility: CLINIC | Age: 33
End: 2023-06-26
Attending: EMERGENCY MEDICINE
Payer: COMMERCIAL

## 2023-06-26 ENCOUNTER — HOSPITAL ENCOUNTER (EMERGENCY)
Facility: CLINIC | Age: 33
Discharge: HOME | End: 2023-06-26
Attending: EMERGENCY MEDICINE | Admitting: EMERGENCY MEDICINE
Payer: COMMERCIAL

## 2023-06-26 VITALS
HEART RATE: 93 BPM | BODY MASS INDEX: 23.03 KG/M2 | TEMPERATURE: 98.6 F | WEIGHT: 170 LBS | DIASTOLIC BLOOD PRESSURE: 82 MMHG | RESPIRATION RATE: 16 BRPM | HEIGHT: 72 IN | SYSTOLIC BLOOD PRESSURE: 114 MMHG | OXYGEN SATURATION: 100 %

## 2023-06-26 DIAGNOSIS — M79.604 RIGHT LEG PAIN: ICD-10-CM

## 2023-06-26 DIAGNOSIS — M25.50 MULTIPLE JOINT PAIN: ICD-10-CM

## 2023-06-26 LAB
ALBUMIN SERPL BCG-MCNC: 4.4 G/DL (ref 3.5–5.2)
ALP SERPL-CCNC: 71 U/L (ref 35–104)
ALT SERPL W P-5'-P-CCNC: 17 U/L (ref 0–50)
ANION GAP SERPL CALCULATED.3IONS-SCNC: 14 MMOL/L (ref 7–15)
AST SERPL W P-5'-P-CCNC: 31 U/L (ref 0–45)
BASOPHILS # BLD AUTO: 0 10E3/UL (ref 0–0.2)
BASOPHILS NFR BLD AUTO: 0 %
BILIRUB SERPL-MCNC: 0.2 MG/DL
BUN SERPL-MCNC: 12.2 MG/DL (ref 6–20)
CALCIUM SERPL-MCNC: 9.2 MG/DL (ref 8.6–10)
CHLORIDE SERPL-SCNC: 100 MMOL/L (ref 98–107)
CREAT SERPL-MCNC: 0.5 MG/DL (ref 0.51–0.95)
DEPRECATED HCO3 PLAS-SCNC: 22 MMOL/L (ref 22–29)
EOSINOPHIL # BLD AUTO: 0 10E3/UL (ref 0–0.7)
EOSINOPHIL NFR BLD AUTO: 0 %
ERYTHROCYTE [DISTWIDTH] IN BLOOD BY AUTOMATED COUNT: 16.2 % (ref 10–15)
GFR SERPL CREATININE-BSD FRML MDRD: >90 ML/MIN/1.73M2
GLUCOSE SERPL-MCNC: 123 MG/DL (ref 70–99)
HCT VFR BLD AUTO: 37.6 % (ref 35–47)
HGB BLD-MCNC: 11.6 G/DL (ref 11.7–15.7)
IMM GRANULOCYTES # BLD: 0 10E3/UL
IMM GRANULOCYTES NFR BLD: 0 %
LYMPHOCYTES # BLD AUTO: 1.3 10E3/UL (ref 0.8–5.3)
LYMPHOCYTES NFR BLD AUTO: 15 %
MCH RBC QN AUTO: 25.3 PG (ref 26.5–33)
MCHC RBC AUTO-ENTMCNC: 30.9 G/DL (ref 31.5–36.5)
MCV RBC AUTO: 82 FL (ref 78–100)
MONOCYTES # BLD AUTO: 0.2 10E3/UL (ref 0–1.3)
MONOCYTES NFR BLD AUTO: 2 %
NEUTROPHILS # BLD AUTO: 7.1 10E3/UL (ref 1.6–8.3)
NEUTROPHILS NFR BLD AUTO: 83 %
NRBC # BLD AUTO: 0 10E3/UL
NRBC BLD AUTO-RTO: 0 /100
PLATELET # BLD AUTO: 328 10E3/UL (ref 150–450)
POTASSIUM SERPL-SCNC: 4.3 MMOL/L (ref 3.4–5.3)
PROT SERPL-MCNC: 7.8 G/DL (ref 6.4–8.3)
RBC # BLD AUTO: 4.59 10E6/UL (ref 3.8–5.2)
SODIUM SERPL-SCNC: 136 MMOL/L (ref 136–145)
WBC # BLD AUTO: 8.6 10E3/UL (ref 4–11)

## 2023-06-26 PROCEDURE — 99284 EMERGENCY DEPT VISIT MOD MDM: CPT | Mod: 25

## 2023-06-26 PROCEDURE — 80053 COMPREHEN METABOLIC PANEL: CPT | Performed by: EMERGENCY MEDICINE

## 2023-06-26 PROCEDURE — 93971 EXTREMITY STUDY: CPT | Mod: RT

## 2023-06-26 PROCEDURE — 36415 COLL VENOUS BLD VENIPUNCTURE: CPT | Performed by: EMERGENCY MEDICINE

## 2023-06-26 PROCEDURE — 85025 COMPLETE CBC W/AUTO DIFF WBC: CPT | Performed by: EMERGENCY MEDICINE

## 2023-06-26 ASSESSMENT — ACTIVITIES OF DAILY LIVING (ADL)
ADLS_ACUITY_SCORE: 35
ADLS_ACUITY_SCORE: 35

## 2023-06-26 NOTE — ED PROVIDER NOTES
History     Chief Complaint:  Joint Pain       The history is provided by the patient.      Juana Madrigal is a 33 year old female who reports with pain behind the right knee.  She report that she has had pain behind the right knee and has noticed a bruise-like appearance to the area.  She endorses tension to the posterior right leg when she ambulates along with burning right foot and ankle pain when she bears weight.  She also endorses pain to the bilateral hands.  She reports having similar symptoms to the left leg recently, which have now resolved.  She does have history of rheumatoid arthritis, which she manages with prednisone, but states she typically has joint pain with this.  Her symptoms currently are not consistent with her typical joint pain.  She denies seeing a rheumatologist recently.      Independent Historian:   None - Patient Only    Review of External Notes:   I reviewed the ED note from [facility] on  6/13/23.  She was seen for rheumatoid arthritis flare and prescribed prednisone.      Medications:    Albuterol  Fluoxetine  Methotrexate  Omeprazole     Past Medical History:    GERD  Rheumatoid arthritis  Asthma   Depression  ADHD    Past Surgical History:    Tympanoplasty     Physical Exam     Patient Vitals for the past 24 hrs:   BP Temp Temp src Pulse Resp SpO2 Height Weight   06/26/23 0032 118/59 98.6  F (37  C) Temporal 97 16 100 % 1.829 m (6') 77.1 kg (170 lb)        Physical Exam  Constitutional: Alert, attentive  CV: 2+ DP and PT pulses, brisk distal cap refill  MSK: Normal inspection to the right leg including right knee, which has no effusion, asymmetric erythema, or asymmetric warmth.  Patient indicates pain to the right popliteal fossa and posterior thigh and calf.  Soft compartments throughout, no pain out of proportion to exam  Neurological: 5/5 strength to the DF, PF, EHL and FHL motor functions; sensation intact to the DP, SP, T, S and S distributions  Skin: Skin is warm  and dry.      Emergency Department Course     Imaging:  US Lower Extremity Venous Duplex Right   Final Result   IMPRESSION:   1.  No deep venous thrombosis in the right lower extremity.          Laboratory:  Labs Ordered and Resulted from Time of ED Arrival to Time of ED Departure   CBC WITH PLATELETS AND DIFFERENTIAL - Abnormal       Result Value    WBC Count 8.6      RBC Count 4.59      Hemoglobin 11.6 (*)     Hematocrit 37.6      MCV 82      MCH 25.3 (*)     MCHC 30.9 (*)     RDW 16.2 (*)     Platelet Count 328      % Neutrophils 83      % Lymphocytes 15      % Monocytes 2      % Eosinophils 0      % Basophils 0      % Immature Granulocytes 0      NRBCs per 100 WBC 0      Absolute Neutrophils 7.1      Absolute Lymphocytes 1.3      Absolute Monocytes 0.2      Absolute Eosinophils 0.0      Absolute Basophils 0.0      Absolute Immature Granulocytes 0.0      Absolute NRBCs 0.0     COMPREHENSIVE METABOLIC PANEL   ROUTINE UA WITH MICROSCOPIC REFLEX TO CULTURE   HCG QUALITATIVE URINE        Emergency Department Course & Assessments:    Assessments:  0312 Initial assessment. I gathered history and examined the patient as noted above.     Disposition:  The patient was discharged to home.     Impression & Plan      Medical Decision Making:  This is a 33-year-old female with history of rheumatoid arthritis, recently seen and given prednisone for a polyarticular arthritis presentation with improvement, who presents for evaluation of right leg pain.  She describes predominantly popliteal, posterior thigh, and calf pain, noting that her joints are improved.  There is no erythema or warmth to suggest infectious process such as septic arthritis or cellulitis.  DVT ultrasound is negative.  This does not appear consistent with primary joint pain.  She does request referral to rheumatology which I have placed.  Plan continue supportive cares, primary care follow-up, and return precautions for worse pain, swelling, or any other  concerns.  Repeat ultrasound in 1 week if symptoms persist.    Diagnosis:    ICD-10-CM    1. Right leg pain  M79.604 Adult Rheumatology  Referral      2. Multiple joint pain  M25.50 Adult Rheumatology  Referral                Scribe Disclosure:  I, Kisha Chi, am serving as a scribe at 2:58 AM on 6/26/2023 to document services personally performed by Shashi Mosley MD based on my observations and the provider's statements to me.     6/26/2023   Shashi Mosley MD [place], John Eric, MD  06/26/23 0510

## 2023-06-26 NOTE — ED TRIAGE NOTES
Pt. Presents to ED with complaints of hip pain, knee pain, and bilateral foot burning. Pt. Reports both knees hurt but the R knee hurts worse. Pt. Also reports a fever but did not take a temperature. Pt. Reports she has rheumatoid arthritis and intermittently takes prednisone. Pt. Tangential, AVSS on RA.

## 2024-08-13 ENCOUNTER — OFFICE VISIT (OUTPATIENT)
Dept: BEHAVIORAL HEALTH | Facility: CLINIC | Age: 34
End: 2024-08-13
Payer: COMMERCIAL

## 2024-08-13 VITALS — HEART RATE: 63 BPM | DIASTOLIC BLOOD PRESSURE: 76 MMHG | SYSTOLIC BLOOD PRESSURE: 110 MMHG | OXYGEN SATURATION: 99 %

## 2024-08-13 DIAGNOSIS — F11.20 OPIOID USE DISORDER, SEVERE, DEPENDENCE (H): Primary | ICD-10-CM

## 2024-08-13 DIAGNOSIS — F15.90 STIMULANT USE DISORDER: ICD-10-CM

## 2024-08-13 DIAGNOSIS — F11.93 OPIOID WITHDRAWAL (H): ICD-10-CM

## 2024-08-13 LAB
AMPHETAMINE QUAL URINE POCT: NEGATIVE
BARBITURATE QUAL URINE POCT: NEGATIVE
BENZODIAZEPINE QUAL URINE POCT: NEGATIVE
BUPRENORPHINE QUAL URINE POCT: ABNORMAL
COCAINE QUAL URINE POCT: NEGATIVE
CREATININE QUAL URINE POCT: ABNORMAL
FENTANYL UR QL: NORMAL
HCG UR QL: NEGATIVE
INTERNAL QC QUAL URINE POCT: ABNORMAL
MDMA QUAL URINE POCT: NEGATIVE
METHADONE QUAL URINE POCT: NEGATIVE
METHAMPHETAMINE QUAL URINE POCT: NEGATIVE
OPIATE QUAL URINE POCT: NEGATIVE
OXYCODONE QUAL URINE POCT: NEGATIVE
PH QUAL URINE POCT: ABNORMAL
PHENCYCLIDINE QUAL URINE POCT: NEGATIVE
POCT KIT EXPIRATION DATE: ABNORMAL
POCT KIT LOT NUMBER: ABNORMAL
SPECIFIC GRAVITY POCT: 1
TEMPERATURE URINE POCT: ABNORMAL
THC QUAL URINE POCT: NEGATIVE

## 2024-08-13 PROCEDURE — 80307 DRUG TEST PRSMV CHEM ANLYZR: CPT | Performed by: FAMILY MEDICINE

## 2024-08-13 PROCEDURE — 80305 DRUG TEST PRSMV DIR OPT OBS: CPT | Performed by: FAMILY MEDICINE

## 2024-08-13 PROCEDURE — G2211 COMPLEX E/M VISIT ADD ON: HCPCS | Performed by: FAMILY MEDICINE

## 2024-08-13 PROCEDURE — 81025 URINE PREGNANCY TEST: CPT | Performed by: FAMILY MEDICINE

## 2024-08-13 PROCEDURE — 99204 OFFICE O/P NEW MOD 45 MIN: CPT | Performed by: FAMILY MEDICINE

## 2024-08-13 RX ORDER — LEVOTHYROXINE SODIUM 25 UG/1
TABLET ORAL
COMMUNITY
Start: 2024-08-02

## 2024-08-13 RX ORDER — SERTRALINE HYDROCHLORIDE 25 MG/1
25 TABLET, FILM COATED ORAL
COMMUNITY
Start: 2024-05-10

## 2024-08-13 RX ORDER — PREDNISONE 10 MG/1
TABLET ORAL
COMMUNITY
Start: 2024-07-25

## 2024-08-13 RX ORDER — BUPRENORPHINE HYDROCHLORIDE AND NALOXONE HYDROCHLORIDE DIHYDRATE 2; .5 MG/1; MG/1
1 TABLET SUBLINGUAL 2 TIMES DAILY
Qty: 14 TABLET | Refills: 0 | Status: SHIPPED | OUTPATIENT
Start: 2024-08-13 | End: 2024-08-29

## 2024-08-13 ASSESSMENT — COLUMBIA-SUICIDE SEVERITY RATING SCALE - C-SSRS
1. IN THE PAST MONTH, HAVE YOU WISHED YOU WERE DEAD OR WISHED YOU COULD GO TO SLEEP AND NOT WAKE UP?: NO
2. HAVE YOU ACTUALLY HAD ANY THOUGHTS OF KILLING YOURSELF?: NO
6. HAVE YOU EVER DONE ANYTHING, STARTED TO DO ANYTHING, OR PREPARED TO DO ANYTHING TO END YOUR LIFE?: NO

## 2024-08-13 ASSESSMENT — PATIENT HEALTH QUESTIONNAIRE - PHQ9: SUM OF ALL RESPONSES TO PHQ QUESTIONS 1-9: 22

## 2024-08-13 NOTE — PROGRESS NOTES
[facility] Initial Visit    ASSESSMENT/PLAN                                                      1. Opioid use disorder, severe, dependence (H)  Reviewed history.  Ongoing fentanyl use for 4 years.  Plan to resume previously effective dose of Suboxone 2-0.5mg BID as she reports taking at [facility] detox.  Script for Narcan provided.  Encouraged plan for residential treatment at [facility].    - Drugs of Abuse Screen Urine (POC CUPS) POCT; Standing  - Fentanyl Qualitative with Reflex to Quant Urine; Future  - Drugs of Abuse Screen Urine (POC CUPS) POCT  - HCG qualitative urine; Future  - Fentanyl Qualitative with Reflex to Quant Urine  - HCG qualitative urine  - buprenorphine-naloxone (SUBOXONE) 2-0.5 MG SUBL sublingual tablet; Place 1 tablet under the tongue 2 times daily  Dispense: 14 tablet; Refill: 0  - naloxone (NARCAN) 4 MG/0.1ML nasal spray; Spray 1 spray (4 mg) into one nostril alternating nostrils as needed for opioid reversal every 2-3 minutes until assistance arrives  Dispense: 0.2 mL; Refill: 11    2. Opioid withdrawal (H)  She is experiencing acute withdrawal.  Recommend resuming Suboxone ASAP.  She has history of QT prolongation so held off on Zofran in clinic despite significant nausea.  Recommend she seek care in ER if symptoms worsen or do not resolve with resumption of Suboxone.      3. Stimulant use disorder  Encouraged plan for residential treatment at [facility].         Return in about 1 week (around 8/20/2024) for Follow up, in person.      Patient counseling completed today:  Discussed mechanism of action, potential risks/benefits/side effects of medications and other recommendations above.    Discussed risk of precipitated withdrawal with initiation of buprenorphine in the presence of full opioid agonists.    Reviewed directions for initiation of buprenorphine to reduce risk of precipitated withdrawal and maximize efficacy.    Harm reduction counseling including never use alone,  availability of naloxone, avoiding combination of opioids with benzodiazepines, alcohol, or other sedatives, safer administration.      Discussed importance of avoiding isolation, building a network of supportive relationships, avoiding people/places/things associated with past use to reduce risk of relapse; including motivational interviewing regarding psychosocial treatment for addiction.     SUBJECTIVE                                                    Rooming information:  Preferred name and pronouns: Junaa    Reason for visit: continue suboxone   Last use of fentanyl or other full opioid agonist: aprox 5 days ago. States she just got out detox.   Last dose of buprenorphine (if applicable:) yesterday   Withdrawal symptoms currently: nausea, hot/cold sweats, body aches   Other substances taken in the last 2 weeks: none   LMP (if applicable:) aprox 1 months   Food/housing/insurance assistance needed: none   3rd party involvement desired:   Best phone number for patient: on file     Depression Response    Patient completed the PHQ-9 assessment for depression and scored >9? Yes  Question 9 on the PHQ-9 was positive for suicidality? No  C-SSRS screener risk level. C-SSRS Risk (Lifetime/Recent)  Calculated C-SSRS Risk Score (Lifetime/Recent): No Risk Indicated   Does patient have current mental health provider? Yes    Is this a virtual visit? No    I personally notified the following: NA         CC/HPI:  Juana Madrigal is a 34 year old female with PMx of RA, methamphetamine use disorder, and opioid use disorder who presents to the Recovery Clinic for initial visit.      Brief History:  Juana Madrigal was first seen in Recovery Clinic on 08/13/24. They were referred by [facility]  Patient's reasons for seeking treatment include wanting to resume Suboxone.  Arrived to [facility] yesterday.  Was at [facility] for detox - was getting Suboxone 2mg BID for 6 days.      Last dose of Suboxone was yesterday morning and  she has since develop return of her withdrawal symptoms - headache, sweating, nausea, stomach cramping, dizziness, irritability.       Substance Use History :  Opioids:   Age at first use: Began using in 2020.  Current use: substance: fentanyl pills/powder; quantity 7-8 pills per day; route: inhalation ; timing of last use: 8/6/24;      IV drug use: No   History of overdose: Yes: 2023  Previous residential or outpatient treatments for addiction : Yes: [facility], one other prior treatment  Previous medication treatments for addiction: No  Longest period of sobriety: couple months  Medical complications related to substance use: none  Hepatitis C: Negative (2/26/24)  HIV: not in medical record    DSM-5 OUD criteria met:  Taken in larger amounts/greater time spent in behavior over longer period of time than intended,Yes:    Persistent desire or unsuccessful efforts to cut down or control use/behavior, Yes:    Cravings, Yes:    Recurrent use/behavior resulting in failure to fulfill major role obligations at work, school, or home, Yes:    Continued use/behavior despite having persistent or recurrent social or interpersonal problems caused or exacerbated by effects of use/behavior, Yes:    Important social, occupational, or recreational activities are given up or reduced because of use/behavior, Yes:   Tolerance, Yes:     Withdrawal, Yes:     Other Addiction History:  Stimulants (cocaine, methamphetamine, MDMA/ecstasy)   Meth - every day since 2020  Sedatives/hypnotics/anxiolytics: (benzodiazepines, GHB, Ambien, phenobarbital)  Denies   Alcohol:   Beer - sometimes, last drink July 2024  Nicotine: (cigarettes, vaping, chew/snuff)  Vape and smoke  Cannabis:   Denies  Hallucinogens/Dissociatives: (acid, mushrooms, ketamine)  Denies  Eating disorder:  Denies  Gambling:   Denies        [place] Prescription Drug Monitoring Program Reviewed:  Yes; as expected      PAST PSYCHIATRIC HISTORY:  Diagnoses:  depression  - previous  psychiatrist  Suicide Attempts: No   Hospitalizations: No         8/13/2024     2:00 PM   PHQ   PHQ-9 Total Score 22   Q9: Thoughts of better off dead/self-harm past 2 weeks Not at all         Social History  Housing status:  homeless , [facility]  Employment status: Unemployed, not seeking work  Relationship status: Single  Children: 1 child - stays with family  Legal: none        Medications:  Current Outpatient Medications   Medication Sig Dispense Refill    buprenorphine-naloxone (SUBOXONE) 2-0.5 MG SUBL sublingual tablet Place 1 tablet under the tongue 2 times daily 14 tablet 0    levothyroxine (SYNTHROID/LEVOTHROID) 25 MCG tablet       naloxone (NARCAN) 4 MG/0.1ML nasal spray Spray 1 spray (4 mg) into one nostril alternating nostrils as needed for opioid reversal every 2-3 minutes until assistance arrives 0.2 mL 11    predniSONE (DELTASONE) 10 MG tablet       sertraline (ZOLOFT) 25 MG tablet Take 25 mg by mouth      acetaminophen (ACETAMINOPHEN EXTRA STRENGTH) 500 MG tablet Take 2 tablets (1,000 mg) by mouth every 8 hours as needed for fever or pain 30 tablet 0    albuterol (ALBUTEROL) 108 (90 BASE) MCG/ACT Inhaler Inhale 2 puffs into the lungs every 4 hours as needed for shortness of breath / dyspnea 1 Inhaler 0    albuterol (PROAIR HFA/PROVENTIL HFA/VENTOLIN HFA) 108 (90 BASE) MCG/ACT Inhaler Inhale 2 puffs into the lungs every 4 hours as needed for shortness of breath / dyspnea or wheezing 1 Inhaler 1    fluticasone (FLOVENT HFA) 110 MCG/ACT Inhaler Inhale 2 puffs into the lungs 2 times daily 1 Inhaler 0    METHOTREXATE PO Take by mouth once a week       No current facility-administered medications for this visit.       No Known Allergies    Past Medical History:   Diagnosis Date    Arthritis     GERD (gastroesophageal reflux disease)     Rheumatoid arthritis (H)     Uncomplicated asthma        No past surgical history on file.    No family history on file.      REVIEW OF  SYSTEMS:  General: Withdrawal symptoms as described below.  No recent fevers.   Eyes:  No vision concerns.  No jaundice.    Resp: No coughing, wheezing; feels some SOB during withdrawal  CV: No palpitations, has some left sided chest pain intermittently (not exertional)  GI: No complaints other than as above  Musculoskeletal: +body aches  Neurologic: +dizziness  Psychiatric: No acute concerns other than as above.   Skin: No rashes or areas of acute infection    OBJECTIVE                                                      /76   Pulse 63   SpO2 99%     Physical Exam  Vitals reviewed.   Constitutional:       General: She is in acute distress.      Appearance: She is ill-appearing (holding emesis bag, appears fatigued) and diaphoretic. She is not toxic-appearing.   HENT:      Head: Normocephalic and atraumatic.      Mouth/Throat:      Mouth: Mucous membranes are moist.   Cardiovascular:      Rate and Rhythm: Normal rate.   Pulmonary:      Effort: Pulmonary effort is normal. No respiratory distress.   Musculoskeletal:      Comments: Tenderness of upper left chest when pressing - reproduces her complaint of chest pain   Skin:     Coloration: Skin is not jaundiced or pale.   Neurological:      General: No focal deficit present.      Mental Status: She is alert and oriented to person, place, and time.      Motor: No tremor.      Gait: Gait normal.   Psychiatric:         Mood and Affect: Mood is depressed.         Speech: Speech normal.         Behavior: Behavior normal.         Thought Content: Thought content does not include suicidal ideation.         Judgment: Judgment normal.         Labs:    UDS:   Lab Results   Component Value Date    BUP Screen Positive (A) 08/13/2024    BZO Negative 08/13/2024    BAR Negative 08/13/2024    JER Negative 08/13/2024    MAMP Negative 08/13/2024    AMP Negative 08/13/2024    MDMA Negative 08/13/2024    MTD Negative 08/13/2024    [id number] Negative 08/13/2024    OXY Negative  08/13/2024    PCP Negative 08/13/2024    THC Negative 08/13/2024    TEMP 92 F 08/13/2024    SGPOCT 1.005 08/13/2024       *POC urine drug screen does not screen for Fentanyl    Recent Results (from the past 720 hour(s))   Drugs of Abuse Screen Urine (POC CUPS) POCT    Collection Time: 08/13/24  3:00 PM   Result Value Ref Range    POCT Kit Lot Number [id number]     POCT Kit Expiration Date 4/8/26     Temperature Urine POCT 92 F 90 F, 92 F, 94 F, 96 F, 98 F, 100 F    Specific [place] POCT 1.005 1.005, 1.015, 1.025    pH Qual Urine POCT 7 pH 4 pH, 5 pH, 7 pH, 9 pH    Creatinine Qual Urine POCT 20 mg/dL 20 mg/dL, 50 mg/dL, 100 mg/dL, 200 mg/dL    Internal QC Qual Urine POCT Valid Valid    Amphetamine Qual Urine POCT Negative Negative    Barbiturate Qual Urine POCT Negative Negative    Buprenorphine Qual Urine POCT Screen Positive (A) Negative    Benzodiazepine Qual Urine POCT Negative Negative    Cocaine Qual Urine POCT Negative Negative    Methamphetamine Qual Urine POCT Negative Negative    MDMA Qual Urine POCT Negative Negative    Methadone Qual Urine POCT Negative Negative    Opiate Qual Urine POCT Negative Negative    Oxycodone Qual Urine POCT Negative Negative    Phencyclidine Qual Urine POCT Negative Negative    THC Qual Urine POCT Negative Negative   Fentanyl Qualitative with Reflex to Quant Urine    Collection Time: 08/13/24  3:36 PM   Result Value Ref Range    Fentanyl Qual Urine Screen Negative Screen Negative   HCG qualitative urine    Collection Time: 08/13/24  3:36 PM   Result Value Ref Range    hCG Urine Qualitative Negative Negative       DO EVER [facility]  [address], [address]  [place], [place] [address] [phone]

## 2024-08-29 ENCOUNTER — OFFICE VISIT (OUTPATIENT)
Dept: BEHAVIORAL HEALTH | Facility: CLINIC | Age: 34
End: 2024-08-29
Payer: COMMERCIAL

## 2024-08-29 VITALS — DIASTOLIC BLOOD PRESSURE: 70 MMHG | HEART RATE: 76 BPM | SYSTOLIC BLOOD PRESSURE: 99 MMHG

## 2024-08-29 DIAGNOSIS — F15.90 STIMULANT USE DISORDER: ICD-10-CM

## 2024-08-29 DIAGNOSIS — F11.20 OPIOID USE DISORDER, SEVERE, DEPENDENCE (H): Primary | ICD-10-CM

## 2024-08-29 DIAGNOSIS — K59.03 CONSTIPATION DUE TO OPIOID THERAPY: ICD-10-CM

## 2024-08-29 DIAGNOSIS — F11.93 OPIOID WITHDRAWAL (H): ICD-10-CM

## 2024-08-29 DIAGNOSIS — T40.2X5A CONSTIPATION DUE TO OPIOID THERAPY: ICD-10-CM

## 2024-08-29 LAB
AMPHETAMINE QUAL URINE POCT: NEGATIVE
BARBITURATE QUAL URINE POCT: NEGATIVE
BENZODIAZEPINE QUAL URINE POCT: NEGATIVE
BUPRENORPHINE QUAL URINE POCT: ABNORMAL
COCAINE QUAL URINE POCT: NEGATIVE
CREATININE QUAL URINE POCT: ABNORMAL
INTERNAL QC QUAL URINE POCT: ABNORMAL
MDMA QUAL URINE POCT: NEGATIVE
METHADONE QUAL URINE POCT: NEGATIVE
METHAMPHETAMINE QUAL URINE POCT: NEGATIVE
OPIATE QUAL URINE POCT: NEGATIVE
OXYCODONE QUAL URINE POCT: NEGATIVE
PH QUAL URINE POCT: ABNORMAL
PHENCYCLIDINE QUAL URINE POCT: NEGATIVE
POCT KIT EXPIRATION DATE: ABNORMAL
POCT KIT LOT NUMBER: ABNORMAL
SPECIFIC GRAVITY POCT: 1.02
TEMPERATURE URINE POCT: ABNORMAL
THC QUAL URINE POCT: NEGATIVE

## 2024-08-29 PROCEDURE — 99214 OFFICE O/P EST MOD 30 MIN: CPT | Performed by: NURSE PRACTITIONER

## 2024-08-29 PROCEDURE — 80305 DRUG TEST PRSMV DIR OPT OBS: CPT | Performed by: NURSE PRACTITIONER

## 2024-08-29 RX ORDER — BUPROPION HYDROCHLORIDE 150 MG/1
150 TABLET ORAL EVERY MORNING
Qty: 30 TABLET | Refills: 0 | Status: SHIPPED | OUTPATIENT
Start: 2024-08-29

## 2024-08-29 RX ORDER — POLYETHYLENE GLYCOL 3350 17 G/17G
1 POWDER, FOR SOLUTION ORAL DAILY
Qty: 578 G | Refills: 1 | Status: SHIPPED | OUTPATIENT
Start: 2024-08-29

## 2024-08-29 RX ORDER — HYDROXYZINE HYDROCHLORIDE 25 MG/1
25 TABLET, FILM COATED ORAL 2 TIMES DAILY PRN
Qty: 14 TABLET | Refills: 0 | Status: SHIPPED | OUTPATIENT
Start: 2024-08-29 | End: 2024-09-05

## 2024-08-29 RX ORDER — BUPRENORPHINE HYDROCHLORIDE AND NALOXONE HYDROCHLORIDE DIHYDRATE 2; .5 MG/1; MG/1
1 TABLET SUBLINGUAL 2 TIMES DAILY
Qty: 14 TABLET | Refills: 0 | Status: SHIPPED | OUTPATIENT
Start: 2024-08-29

## 2024-08-29 ASSESSMENT — PATIENT HEALTH QUESTIONNAIRE - PHQ9: SUM OF ALL RESPONSES TO PHQ QUESTIONS 1-9: 24

## 2024-08-29 NOTE — PROGRESS NOTES
[facility] Follow Up    ASSESSMENT/PLAN                                                      1. Opioid use disorder, severe, dependence (H)  - presents for follow up last seen on 8/13/24, symptoms well controlled with Suboxone 2 mg BID. TDD 4 mg. Endorses opioid withdrawal symptoms after running out of medication. Denies return to full opioid agonist use. Confirms date of last use as 2 weeks ago. Plan to resume Suboxone ASAP.   Continue programing at [facility]   Confirms access to narcan   - Drugs of Abuse Screen Urine (POC CUPS) POCT  - Drug Confirmation Panel Urine with Creatinine; Future  - buprenorphine-naloxone (SUBOXONE) 2-0.5 MG SUBL sublingual tablet; Place 1 tablet under the tongue 2 times daily.  Dispense: 14 tablet; Refill: 0    2. Stimulant use disorder  - h/o daily methamphetamine use, last use 2 weeks ago, significant cravings. Plan to start Bupropion as below, titrate to 300 mg at follow up if well tolerated. No seizure h/o. Currently taking low dose Sertraline 25 mg daily.   Continue programing at [facility]   - buPROPion (WELLBUTRIN XL) 150 MG 24 hr tablet; Take 1 tablet (150 mg) by mouth every morning.  Dispense: 30 tablet; Refill: 0    3. Opioid withdrawal (H)  - resuming buprenorphine at previously effective dose.   - Take Hydroxyzine prn for symptoms while stabilizing on therapeutic dose.   - buprenorphine-naloxone (SUBOXONE) 2-0.5 MG SUBL sublingual tablet; Place 1 tablet under the tongue 2 times daily.  Dispense: 14 tablet; Refill: 0  - hydrOXYzine HCl (ATARAX) 25 MG tablet; Take 1 tablet (25 mg) by mouth 2 times daily as needed for other or anxiety (opioid withdrawal symtoms).  Dispense: 14 tablet; Refill: 0    4. Constipation due to opioid therapy  Start miralax prn   - polyethylene glycol (MIRALAX) 17 GM/Dose powder; Take 17 g (1 Capful) by mouth daily.  Dispense: 578 g; Refill: 1        Return in about 1 week (around 9/5/2024) for Follow up, with any available provider, in  person.      Patient counseling completed today:  Discussed mechanism of action, potential risks/benefits/side effects of medications and other recommendations above.    Discussed risk of precipitated withdrawal with initiation of buprenorphine in the presence of full opioid agonists.    Reviewed directions for initiation of buprenorphine to reduce risk of precipitated withdrawal and maximize efficacy.    Harm reduction counseling including never use alone, availability of naloxone, risks associated with concurrent use of opioids and benzodiazepines, alcohol, or other sedatives, safer administration as applicable.  Discussed importance of avoiding isolation, building a network of supportive relationships, avoiding people/places/things associated with past use to reduce risk of relapse; including motivational interviewing regarding psychosocial interventions.     A/P from 8/13/24:   1. Opioid use disorder, severe, dependence (H)  Reviewed history.  Ongoing fentanyl use for 4 years.  Plan to resume previously effective dose of Suboxone 2-0.5mg BID as she reports taking at [facility] detox.  Script for Narcan provided.  Encouraged plan for residential treatment at [facility].    - Drugs of Abuse Screen Urine (POC CUPS) POCT; Standing  - Fentanyl Qualitative with Reflex to Quant Urine; Future  - Drugs of Abuse Screen Urine (POC CUPS) POCT  - HCG qualitative urine; Future  - Fentanyl Qualitative with Reflex to Quant Urine  - HCG qualitative urine  - buprenorphine-naloxone (SUBOXONE) 2-0.5 MG SUBL sublingual tablet; Place 1 tablet under the tongue 2 times daily  Dispense: 14 tablet; Refill: 0  - naloxone (NARCAN) 4 MG/0.1ML nasal spray; Spray 1 spray (4 mg) into one nostril alternating nostrils as needed for opioid reversal every 2-3 minutes until assistance arrives  Dispense: 0.2 mL; Refill: 11     2. Opioid withdrawal (H)  She is experiencing acute withdrawal.  Recommend resuming Suboxone ASAP.  She has history of QT prolongation  so held off on Zofran in clinic despite significant nausea.  Recommend she seek care in ER if symptoms worsen or do not resolve with resumption of Suboxone.       3. Stimulant use disorder  Encouraged plan for residential treatment at [facility].      SUBJECTIVE                                                          CC/HPI:  Juana Madrigal is a 34 year old female with PMx of RA, methamphetamine use disorder, and opioid use disorder who presents to the Recovery Clinic for follow up.     08/29/24 HPI:  - here today for follow up. Reports she was taking Subxone 2 mg BID as prescribed with adequate control of symptoms. Ran out about 4 days ago, now experiencing withdrawal symptoms. No return to opioid use but she is fearful of this. Previous use of fentanyl pills and powder. Confirms date of last use as 2 weeks ago. Current in inpatient programming at [facility].  Reports adverse SE from Suboxone is constipation. Miralax has worked in the past.   Sleeping ok while taking Subxone.   - methamphetamine cravings, last use 2 weeks ago, no seizure history      Brief History:  Juana Madrigal was first seen in Recovery Clinic on 08/13/24, referred by [facility]  Patient's reasons for seeking treatment include wanting to resume Suboxone.  Was at [facility] for detox prior to starting [facility]- was taking Suboxone 2mg BID for 6 days.       Substance Use History :  Opioids:   Age at first use: Began using in 2020.  Current use: substance: fentanyl pills/powder; quantity 7-8 pills per day; route: inhalation ; timing of last use: 8/6/24                 IV drug use: No   History of overdose: Yes: 2023  Previous residential or outpatient treatments for addiction : Yes: [facility], one other prior treatment  Previous medication treatments for addiction: No  Longest period of sobriety: couple months  Medical complications related to substance use: none  Hepatitis C: Negative (2/26/24)  HIV: not in medical record     Other Addiction  History:  Stimulants (cocaine, methamphetamine, MDMA/ecstasy)   Meth - every day since 2020  Sedatives/hypnotics/anxiolytics: (benzodiazepines, GHB, Ambien, phenobarbital)  Denies   Alcohol:   Beer - sometimes, last drink July 2024  Nicotine: (cigarettes, vaping, chew/snuff)  Vape and smoke  Cannabis:   Denies  Hallucinogens/Dissociatives: (acid, mushrooms, ketamine)  Denies  Eating disorder:  Denies  Gambling:              Denies        PAST PSYCHIATRIC HISTORY:  Diagnoses:  depression  - previous psychiatrist  Suicide Attempts: No   Hospitalizations: No           8/13/2024     2:00 PM 8/29/2024    10:00 AM   PHQ   PHQ-9 Total Score 22 24   Q9: Thoughts of better off dead/self-harm past 2 weeks Not at all Not at all       Social History  Housing status:  homeless , currently in [facility] residential program  Employment status: Unemployed, not seeking work  Relationship status: Single  Children: 1 child - stays with family  Legal: none      Labs discussed with patient?  Yes      [place] Prescription Drug Monitoring Program Reviewed:  Yes    Medications:  Current Outpatient Medications   Medication Sig Dispense Refill    buprenorphine-naloxone (SUBOXONE) 2-0.5 MG SUBL sublingual tablet Place 1 tablet under the tongue 2 times daily. 14 tablet 0    buPROPion (WELLBUTRIN XL) 150 MG 24 hr tablet Take 1 tablet (150 mg) by mouth every morning. 30 tablet 0    hydrOXYzine HCl (ATARAX) 25 MG tablet Take 1 tablet (25 mg) by mouth 2 times daily as needed for other or anxiety (opioid withdrawal symtoms). 14 tablet 0    acetaminophen (ACETAMINOPHEN EXTRA STRENGTH) 500 MG tablet Take 2 tablets (1,000 mg) by mouth every 8 hours as needed for fever or pain 30 tablet 0    albuterol (ALBUTEROL) 108 (90 BASE) MCG/ACT Inhaler Inhale 2 puffs into the lungs every 4 hours as needed for shortness of breath / dyspnea 1 Inhaler 0    albuterol (PROAIR HFA/PROVENTIL HFA/VENTOLIN HFA) 108 (90 BASE) MCG/ACT Inhaler Inhale 2 puffs into the lungs  every 4 hours as needed for shortness of breath / dyspnea or wheezing 1 Inhaler 1    fluticasone (FLOVENT HFA) 110 MCG/ACT Inhaler Inhale 2 puffs into the lungs 2 times daily 1 Inhaler 0    levothyroxine (SYNTHROID/LEVOTHROID) 25 MCG tablet       METHOTREXATE PO Take by mouth once a week      naloxone (NARCAN) 4 MG/0.1ML nasal spray Spray 1 spray (4 mg) into one nostril alternating nostrils as needed for opioid reversal every 2-3 minutes until assistance arrives 0.2 mL 11    predniSONE (DELTASONE) 10 MG tablet       sertraline (ZOLOFT) 25 MG tablet Take 25 mg by mouth       No current facility-administered medications for this visit.       No Known Allergies    PMH, PSH, FamHx reviewed      OBJECTIVE                                                      BP 99/70   Pulse 76     Physical Exam  Constitutional:       General: She is not in acute distress.     Appearance: Normal appearance. She is not diaphoretic.   HENT:      Head: Normocephalic and atraumatic.   Eyes:      Extraocular Movements: Extraocular movements intact.   Cardiovascular:      Rate and Rhythm: Normal rate.   Pulmonary:      Effort: Pulmonary effort is normal.   Neurological:      Mental Status: She is alert and oriented to person, place, and time.      Motor: No tremor.   Psychiatric:         Attention and Perception: Attention and perception normal.         Mood and Affect: Mood is depressed. Affect is tearful.         Speech: Speech normal.         Behavior: Behavior is cooperative.         Thought Content: Thought content normal. Thought content does not include suicidal ideation. Thought content does not include suicidal plan.         Cognition and Memory: Cognition and memory normal.      Comments: Insight adequate judgment appropriate          Labs:    UDS:      Lab Results   Component Value Date    BUP Screen Positive (A) 08/29/2024    BZO Negative 08/29/2024    BAR Negative 08/29/2024    JER Negative 08/29/2024    MAMP Negative 08/29/2024     AMP Negative 08/29/2024    MDMA Negative 08/29/2024    MTD Negative 08/29/2024    [id number] Negative 08/29/2024    OXY Negative 08/29/2024    PCP Negative 08/29/2024    THC Negative 08/29/2024    TEMP Invalid (A) 08/29/2024    SGPOCT 1.025 08/29/2024     *POC urine drug screen does not screen for Fentanyl      Recent Results (from the past 240 hour(s))   Drugs of Abuse Screen Urine (POC CUPS) POCT    Collection Time: 08/29/24 10:32 AM   Result Value Ref Range    POCT Kit Lot Number [id number]     POCT Kit Expiration Date 3610453     Temperature Urine POCT Invalid (A) 90 F, 92 F, 94 F, 96 F, 98 F, 100 F    Specific [place] POCT 1.025 1.005, 1.015, 1.025    pH Qual Urine POCT 5 pH 4 pH, 5 pH, 7 pH, 9 pH    Creatinine Qual Urine POCT 100 mg/dL 20 mg/dL, 50 mg/dL, 100 mg/dL, 200 mg/dL    Internal QC Qual Urine POCT Valid Valid    Amphetamine Qual Urine POCT Negative Negative    Barbiturate Qual Urine POCT Negative Negative    Buprenorphine Qual Urine POCT Screen Positive (A) Negative    Benzodiazepine Qual Urine POCT Negative Negative    Cocaine Qual Urine POCT Negative Negative    Methamphetamine Qual Urine POCT Negative Negative    MDMA Qual Urine POCT Negative Negative    Methadone Qual Urine POCT Negative Negative    Opiate Qual Urine POCT Negative Negative    Oxycodone Qual Urine POCT Negative Negative    Phencyclidine Qual Urine POCT Negative Negative    THC Qual Urine POCT Negative Negative         GABE Cruz [place] [id number] [address] [id number] [phone]

## 2024-08-29 NOTE — NURSING NOTE
[facility]      Rooming information:    Approximate last use of full opioid agonist: apprx 2 weeks ago  Taking buprenorphine? Yes: suboxne  How much per day? 4mg  Number of buprenorphine films/tablets remaining currently: 0  Side effects related to buprenorphine (constipation, dry mouth, sedation?) yes, stomach heart burn  Narcan currently available: Yes  Other recent substance use:    Denies  NICOTINE-Yes: cig  If using nicotine, ready to quit No    Point of care urine drug screen positive for:  Lab Results   Component Value Date    BUP Screen Positive (A) 2024    BZO Negative 2024    BAR Negative 2024    JER Negative 2024    MAMP Negative 2024    AMP Negative 2024    MDMA Negative 2024    MTD Negative 2024    [id number] Negative 2024    OXY Negative 2024    PCP Negative 2024    THC Negative 2024    TEMP Invalid (A) 2024    SGPOCT 1.025 2024       *POC urine drug screen does not screen for Fentanyl    Pregnancy Status   LMP: 2024  Birth control/barriers: no  Interested in birth control if none currently? No    Depression Response    Patient completed the PHQ-9 assessment for depression and scored >9? Yes  Question 9 on the PHQ-9 was positive for suicidality? No  Does patient have current mental health provider? No  C-SSRS screener risk level.       Is this a virtual visit? No    I personally notified the followin/29/2024    10:00 AM   PHQ Assesment Total Score(s)   PHQ-9 Score 24         Housing needs: currently at [facility] 2 weeks ago,looking for single housing resources    Insurance: active has copay    Current legal issues: none    Contact information up to date? Yes    3rd Party Involvement not today  (please obtain PARMJIT if pt would like to include)    Carlos Parker MA  2024  10:12 AM

## 2025-01-14 ENCOUNTER — HOSPITAL ENCOUNTER (EMERGENCY)
Facility: CLINIC | Age: 35
Discharge: HOME | End: 2025-01-15
Attending: EMERGENCY MEDICINE
Payer: COMMERCIAL

## 2025-01-14 DIAGNOSIS — J10.1 INFLUENZA A: ICD-10-CM

## 2025-01-14 DIAGNOSIS — Z29.89 NEED FOR PROPHYLAXIS AGAINST SEXUALLY TRANSMITTED DISEASES: ICD-10-CM

## 2025-01-14 LAB
FLUAV RNA SPEC QL NAA+PROBE: POSITIVE
FLUBV RNA RESP QL NAA+PROBE: NEGATIVE
HCG UR QL: NEGATIVE
RSV RNA SPEC NAA+PROBE: NEGATIVE
SARS-COV-2 RNA RESP QL NAA+PROBE: NEGATIVE

## 2025-01-14 PROCEDURE — 81025 URINE PREGNANCY TEST: CPT | Performed by: EMERGENCY MEDICINE

## 2025-01-14 PROCEDURE — 99284 EMERGENCY DEPT VISIT MOD MDM: CPT | Performed by: EMERGENCY MEDICINE

## 2025-01-14 PROCEDURE — 87637 SARSCOV2&INF A&B&RSV AMP PRB: CPT | Performed by: EMERGENCY MEDICINE

## 2025-01-14 PROCEDURE — 250N000013 HC RX MED GY IP 250 OP 250 PS 637: Performed by: EMERGENCY MEDICINE

## 2025-01-14 PROCEDURE — 87491 CHLMYD TRACH DNA AMP PROBE: CPT | Performed by: EMERGENCY MEDICINE

## 2025-01-14 PROCEDURE — 36415 COLL VENOUS BLD VENIPUNCTURE: CPT | Performed by: EMERGENCY MEDICINE

## 2025-01-14 PROCEDURE — 87389 HIV-1 AG W/HIV-1&-2 AB AG IA: CPT | Performed by: EMERGENCY MEDICINE

## 2025-01-14 RX ORDER — ACETAMINOPHEN 325 MG/1
975 TABLET ORAL ONCE
Status: COMPLETED | OUTPATIENT
Start: 2025-01-14 | End: 2025-01-14

## 2025-01-14 RX ORDER — DOXYCYCLINE 100 MG/1
100 CAPSULE ORAL ONCE
Status: COMPLETED | OUTPATIENT
Start: 2025-01-15 | End: 2025-01-15

## 2025-01-14 RX ORDER — IBUPROFEN 600 MG/1
600 TABLET, FILM COATED ORAL ONCE
Status: COMPLETED | OUTPATIENT
Start: 2025-01-14 | End: 2025-01-14

## 2025-01-14 RX ORDER — DOXYCYCLINE 100 MG/1
100 CAPSULE ORAL 2 TIMES DAILY
Qty: 14 CAPSULE | Refills: 0 | Status: SHIPPED | OUTPATIENT
Start: 2025-01-14 | End: 2025-01-21

## 2025-01-14 RX ADMIN — ACETAMINOPHEN 975 MG: 325 TABLET, FILM COATED ORAL at 21:03

## 2025-01-14 RX ADMIN — IBUPROFEN 600 MG: 600 TABLET, FILM COATED ORAL at 21:03

## 2025-01-14 ASSESSMENT — COLUMBIA-SUICIDE SEVERITY RATING SCALE - C-SSRS
2. HAVE YOU ACTUALLY HAD ANY THOUGHTS OF KILLING YOURSELF IN THE PAST MONTH?: NO
6. HAVE YOU EVER DONE ANYTHING, STARTED TO DO ANYTHING, OR PREPARED TO DO ANYTHING TO END YOUR LIFE?: NO
1. IN THE PAST MONTH, HAVE YOU WISHED YOU WERE DEAD OR WISHED YOU COULD GO TO SLEEP AND NOT WAKE UP?: NO

## 2025-01-14 ASSESSMENT — ACTIVITIES OF DAILY LIVING (ADL)
ADLS_ACUITY_SCORE: 41
ADLS_ACUITY_SCORE: 41

## 2025-01-15 VITALS
RESPIRATION RATE: 16 BRPM | OXYGEN SATURATION: 100 % | BODY MASS INDEX: 25.1 KG/M2 | HEIGHT: 69 IN | SYSTOLIC BLOOD PRESSURE: 118 MMHG | HEART RATE: 72 BPM | DIASTOLIC BLOOD PRESSURE: 72 MMHG | TEMPERATURE: 96 F

## 2025-01-15 LAB
C TRACH DNA SPEC QL PROBE+SIG AMP: NEGATIVE
HIV 1+2 AB+HIV1 P24 AG SERPL QL IA: NONREACTIVE
N GONORRHOEA DNA SPEC QL NAA+PROBE: NEGATIVE
SPECIMEN TYPE: NORMAL

## 2025-01-15 PROCEDURE — 96372 THER/PROPH/DIAG INJ SC/IM: CPT | Performed by: EMERGENCY MEDICINE

## 2025-01-15 PROCEDURE — 250N000011 HC RX IP 250 OP 636: Performed by: EMERGENCY MEDICINE

## 2025-01-15 PROCEDURE — 250N000013 HC RX MED GY IP 250 OP 250 PS 637: Performed by: EMERGENCY MEDICINE

## 2025-01-15 PROCEDURE — 250N000009 HC RX 250: Performed by: EMERGENCY MEDICINE

## 2025-01-15 RX ADMIN — DOXYCYCLINE HYCLATE 100 MG: 100 CAPSULE, GELATIN COATED ORAL at 01:05

## 2025-01-15 RX ADMIN — BICTEGRAVIR SODIUM, EMTRICITABINE, AND TENOFOVIR ALAFENAMIDE FUMARATE 1 TABLET: 50; 200; 25 TABLET ORAL at 01:04

## 2025-01-15 RX ADMIN — LIDOCAINE HYDROCHLORIDE 500 MG: 10 INJECTION, SOLUTION EPIDURAL; INFILTRATION; INTRACAUDAL; PERINEURAL at 01:03

## 2025-01-15 ASSESSMENT — ACTIVITIES OF DAILY LIVING (ADL): ADLS_ACUITY_SCORE: 41

## 2025-01-15 NOTE — ED NOTES
Pt keeps falling asleep while being triaged. Pt upset with writer when woken up and upset that writer is not holding the thermometer. Pt was informed that as long as the patient can hold the thermometer writer usually have the patient hold it so they can control

## 2025-01-15 NOTE — ED PROVIDER NOTES
[facility] [place])    1/14/25        History     Chief Complaint   Patient presents with    Cough     Fever cough, headache x 1 week     HPI  Juana Madrigal is a 34 year old female who with hx of rheumatoid arthritis, methamphetamine use and homelessness presented to the emergency department with cough. She reports a cough for about 1 week. This has been non productive. She has felt generally unwell. No fevers. No known sick contacts. No sore throat but she does have rhinorrhea. She has taken tylenol without relief.    She also reports concern for STI;s. She states she had receptive intercourse but the condom had broken. She states she has had some itching but no discharge or bleeding. This occurred a couple days ago.          Past Medical History  Past Medical History:   Diagnosis Date    Arthritis     GERD (gastroesophageal reflux disease)     Rheumatoid arthritis (H)     Uncomplicated asthma      No past surgical history on file.  bictegravir-emtricitabine-tenofovir (BIKTARVY) -25 MG per tablet  doxycycline hyclate (VIBRAMYCIN) 100 MG capsule  acetaminophen (ACETAMINOPHEN EXTRA STRENGTH) 500 MG tablet  albuterol (ALBUTEROL) 108 (90 BASE) MCG/ACT Inhaler  albuterol (PROAIR HFA/PROVENTIL HFA/VENTOLIN HFA) 108 (90 BASE) MCG/ACT Inhaler  buprenorphine-naloxone (SUBOXONE) 2-0.5 MG SUBL sublingual tablet  buPROPion (WELLBUTRIN XL) 150 MG 24 hr tablet  fluticasone (FLOVENT HFA) 110 MCG/ACT Inhaler  levothyroxine (SYNTHROID/LEVOTHROID) 25 MCG tablet  METHOTREXATE PO  naloxone (NARCAN) 4 MG/0.1ML nasal spray  polyethylene glycol (MIRALAX) 17 GM/Dose powder  predniSONE (DELTASONE) 10 MG tablet  sertraline (ZOLOFT) 25 MG tablet      No Known Allergies  Family History  No family history on file.  Social History   Social History     Tobacco Use    Smoking status: Some Days    Smokeless tobacco: Never    Tobacco comments:     Occasional Hookah use (tobacco)    Substance Use  "Topics    Alcohol use: Not Currently    Drug use: Yes     Types: Marijuana     Comment: THC vape occasionally      Past medical history, past surgical history, medications, allergies, family history, and social history were reviewed with the patient. No additional pertinent items.   A complete review of systems was performed with pertinent positives and negatives noted in the HPI, and all other systems negative.    Physical Exam   BP: 121/81  Pulse: 77  Temp: (!) 95.6  F (35.3  C)  Resp: 16  Height: 175.3 cm (5' 9\")  SpO2: 100 %  Physical Exam  Constitutional:       General: She is not in acute distress.     Appearance: She is not toxic-appearing.   HENT:      Head: Normocephalic and atraumatic.      Mouth/Throat:      Mouth: Mucous membranes are moist.      Pharynx: Oropharynx is clear.   Eyes:      Conjunctiva/sclera: Conjunctivae normal.      Pupils: Pupils are equal, round, and reactive to light.   Cardiovascular:      Rate and Rhythm: Normal rate and regular rhythm.      Heart sounds: No murmur heard.  Pulmonary:      Effort: Pulmonary effort is normal. No respiratory distress.      Breath sounds: No wheezing or rales.   Musculoskeletal:         General: Normal range of motion.   Skin:     General: Skin is warm and dry.   Neurological:      General: No focal deficit present.      Mental Status: She is alert and oriented to person, place, and time.           ED Course, Procedures, & Data      Procedures           IV Antibiotics given and/or elevated Lactate of 0 and no sepsis note found - Delete this reminder and enter the sepsis note or '.edcms' before signing chart.>>>     Results for orders placed or performed during the hospital encounter of 01/14/25   Influenza A/B, RSV and SARS-CoV2 PCR (COVID-19) Nose     Status: Abnormal    Specimen: Nose; Swab   Result Value Ref Range    Influenza A PCR Positive (A) Negative    Influenza B PCR Negative Negative    RSV PCR Negative Negative    SARS CoV2 PCR Negative " Negative    Narrative    Testing was performed using the Xpert Xpress CoV2/Flu/RSV Assay on the [employer] Gene[employer]pert Instrument. This test should be ordered for the detection of SARS-CoV2, influenza, and RSV viruses in individuals with signs and symptoms of respiratory tract infection. This test is for in vitro diagnostic use under the [place] FDA for laboratories certified under CLIA to perform high or moderate complexity testing. This test has been [place] FDA cleared. A negative result does not rule out the presence of PCR inhibitors in the specimen or target RNA in concentration below the limit of detection for the assay. If only one viral target is positive but coinfection with multiple targets is suspected, the sample should be re-tested with another FDA cleared, approved, or authorized test, if coninfection would change clinical management. This test was validated by the [facility]. These laboratories are certified under the Clinical Laboratory Improvement Amendments of 1988 (CLIA-88) as qualified to perfom high complexity laboratory testing.   HCG qualitative urine (UPT)     Status: Normal   Result Value Ref Range    hCG Urine Qualitative Negative Negative     Medications   acetaminophen (TYLENOL) tablet 975 mg (975 mg Oral $Given 1/14/25 2103)   ibuprofen (ADVIL/MOTRIN) tablet 600 mg (600 mg Oral $Given 1/14/25 2103)   cefTRIAXone (ROCEPHIN) 500 mg in lidocaine injection (500 mg Intramuscular $Given 1/15/25 0103)   doxycycline hyclate (VIBRAMYCIN) capsule 100 mg (100 mg Oral $Given 1/15/25 0105)   bictegravir-emtricitabine-tenofovir (BIKTARVY) -25 MG per tablet 1 tablet (1 tablet Oral $Given 1/15/25 0104)     Labs Ordered and Resulted from Time of ED Arrival to Time of ED Departure   INFLUENZA A/B, RSV AND SARS-COV2 PCR - Abnormal       Result Value    Influenza A PCR Positive (*)     Influenza B PCR Negative      RSV PCR Negative      SARS CoV2 PCR Negative     HCG QUALITATIVE URINE - Normal     hCG Urine Qualitative Negative     HIV ANTIGEN ANTIBODY COMBO   CHLAMYDIA TRACHOMATIS/NEISSERIA GONORRHOEAE BY PCR     No orders to display          Critical care was not performed.     Medical Decision Making  The patient's presentation was of moderate complexity (an undiagnosed new problem with uncertain prognosis).    The patient's evaluation involved:  review of external note(s) from 1 sources (ED note)  review of 3+ test result(s) ordered prior to this encounter (cbc, bmp, hepatic panel)  strong consideration of a test (CXR) that was ultimately deferred  ordering and/or review of 3+ test(s) in this encounter (see separate area of note for details)    The patient's management necessitated moderate risk (prescription drug management including medications given in the ED).    Assessment & Plan    This is a 34-year-old female here with a cough.  She was afebrile, vitals were reassuring.  Symptoms are most suggestive of a viral illness.  Her pulmonary exam was reassuring and showed no focal wheezing, rales, coarse breath sounds, or other findings to suggest lobar pneumonia.  She did test positive for influenza here.  She is not a candidate for Tamiflu.  She was given ibuprofen and Tylenol for symptoms.    Patient also noted concern for STIs.  She requested testing for STIs.  I discussed at length potential prophylaxis for STIs including but not limited to side effects of prophylactic medications.  She was interested in prophylaxis after the discussion.  Testing for HIV and gonorrhea/committee were sent.  She had blood work done a few days ago which was reassuring and so repeat blood work was deferred.  She was given a dose of IM ceftriaxone and started on doxycycline as well as Biktarvy.  Referral was placed to primary care for follow-up.  Return precaution were discussed and all questions answered.    I have reviewed the nursing notes. I have reviewed the findings, diagnosis, plan and need for follow up with the  patient.    Discharge Medication List as of 1/15/2025 12:04 AM        START taking these medications    Details   bictegravir-emtricitabine-tenofovir (BIKTARVY) -25 MG per tablet Take 1 tablet by mouth daily., Disp-30 tablet, R-0, E-Prescribe      doxycycline hyclate (VIBRAMYCIN) 100 MG capsule Take 1 capsule (100 mg) by mouth 2 times daily for 7 days., Disp-14 capsule, R-0, E-Prescribe             Final diagnoses:   Influenza A   Need for prophylaxis against sexually transmitted diseases       Agapito Trent  [facility] EMERGENCY DEPARTMENT  1/14/2025     Agapito Trent MD  01/15/25 020

## 2025-01-15 NOTE — ED TRIAGE NOTES
Fever cough, headache x 1 week     Triage Assessment (Adult)       Row Name 01/14/25 2046          Triage Assessment    Airway WDL WDL        Respiratory WDL    Respiratory WDL X;WDL        Skin Circulation/Temperature WDL    Skin Circulation/Temperature WDL WDL        Peripheral/Neurovascular WDL    Peripheral Neurovascular WDL WDL        Cognitive/Neuro/Behavioral WDL    Cognitive/Neuro/Behavioral WDL WDL

## 2025-02-12 ENCOUNTER — HOSPITAL ENCOUNTER (EMERGENCY)
Facility: CLINIC | Age: 35
Discharge: HOME | End: 2025-02-13
Attending: EMERGENCY MEDICINE
Payer: COMMERCIAL

## 2025-02-12 DIAGNOSIS — F11.23 OPIOID DEPENDENCE WITH WITHDRAWAL (H): ICD-10-CM

## 2025-02-12 DIAGNOSIS — M06.9 RHEUMATOID ARTHRITIS, INVOLVING UNSPECIFIED SITE, UNSPECIFIED WHETHER RHEUMATOID FACTOR PRESENT (H): ICD-10-CM

## 2025-02-12 DIAGNOSIS — F11.90 OPIOID USE DISORDER: ICD-10-CM

## 2025-02-12 LAB
BASOPHILS # BLD AUTO: 0 10E3/UL (ref 0–0.2)
BASOPHILS NFR BLD AUTO: 0 %
EOSINOPHIL # BLD AUTO: 0.2 10E3/UL (ref 0–0.7)
EOSINOPHIL NFR BLD AUTO: 3 %
ERYTHROCYTE [DISTWIDTH] IN BLOOD BY AUTOMATED COUNT: 16.2 % (ref 10–15)
ERYTHROCYTE [SEDIMENTATION RATE] IN BLOOD BY WESTERGREN METHOD: 26 MM/HR (ref 0–20)
HCT VFR BLD AUTO: 35.5 % (ref 35–47)
HGB BLD-MCNC: 11.7 G/DL (ref 11.7–15.7)
IMM GRANULOCYTES # BLD: 0 10E3/UL
IMM GRANULOCYTES NFR BLD: 0 %
LYMPHOCYTES # BLD AUTO: 2 10E3/UL (ref 0.8–5.3)
LYMPHOCYTES NFR BLD AUTO: 36 %
MCH RBC QN AUTO: 26.1 PG (ref 26.5–33)
MCHC RBC AUTO-ENTMCNC: 33 G/DL (ref 31.5–36.5)
MCV RBC AUTO: 79 FL (ref 78–100)
MONOCYTES # BLD AUTO: 0.2 10E3/UL (ref 0–1.3)
MONOCYTES NFR BLD AUTO: 4 %
NEUTROPHILS # BLD AUTO: 3.1 10E3/UL (ref 1.6–8.3)
NEUTROPHILS NFR BLD AUTO: 57 %
NRBC # BLD AUTO: 0 10E3/UL
NRBC BLD AUTO-RTO: 0 /100
PLATELET # BLD AUTO: 292 10E3/UL (ref 150–450)
RBC # BLD AUTO: 4.49 10E6/UL (ref 3.8–5.2)
WBC # BLD AUTO: 5.4 10E3/UL (ref 4–11)

## 2025-02-12 PROCEDURE — 83735 ASSAY OF MAGNESIUM: CPT | Performed by: EMERGENCY MEDICINE

## 2025-02-12 PROCEDURE — 84439 ASSAY OF FREE THYROXINE: CPT | Performed by: EMERGENCY MEDICINE

## 2025-02-12 PROCEDURE — 99285 EMERGENCY DEPT VISIT HI MDM: CPT | Performed by: EMERGENCY MEDICINE

## 2025-02-12 PROCEDURE — 84703 CHORIONIC GONADOTROPIN ASSAY: CPT | Performed by: EMERGENCY MEDICINE

## 2025-02-12 PROCEDURE — 85025 COMPLETE CBC W/AUTO DIFF WBC: CPT | Performed by: EMERGENCY MEDICINE

## 2025-02-12 PROCEDURE — 36415 COLL VENOUS BLD VENIPUNCTURE: CPT | Performed by: EMERGENCY MEDICINE

## 2025-02-12 PROCEDURE — 84484 ASSAY OF TROPONIN QUANT: CPT | Performed by: EMERGENCY MEDICINE

## 2025-02-12 PROCEDURE — 86140 C-REACTIVE PROTEIN: CPT | Performed by: EMERGENCY MEDICINE

## 2025-02-12 PROCEDURE — 85652 RBC SED RATE AUTOMATED: CPT | Performed by: EMERGENCY MEDICINE

## 2025-02-12 PROCEDURE — 84145 PROCALCITONIN (PCT): CPT | Performed by: EMERGENCY MEDICINE

## 2025-02-12 PROCEDURE — 83690 ASSAY OF LIPASE: CPT | Performed by: EMERGENCY MEDICINE

## 2025-02-12 PROCEDURE — 99284 EMERGENCY DEPT VISIT MOD MDM: CPT | Mod: 25 | Performed by: EMERGENCY MEDICINE

## 2025-02-12 PROCEDURE — G2213 INITIAT MED ASSIST TX IN ER: HCPCS | Performed by: EMERGENCY MEDICINE

## 2025-02-12 PROCEDURE — 87637 SARSCOV2&INF A&B&RSV AMP PRB: CPT | Mod: 59 | Performed by: EMERGENCY MEDICINE

## 2025-02-12 PROCEDURE — 80053 COMPREHEN METABOLIC PANEL: CPT | Performed by: EMERGENCY MEDICINE

## 2025-02-12 PROCEDURE — 250N000013 HC RX MED GY IP 250 OP 250 PS 637: Performed by: EMERGENCY MEDICINE

## 2025-02-12 PROCEDURE — 258N000003 HC RX IP 258 OP 636: Performed by: EMERGENCY MEDICINE

## 2025-02-12 PROCEDURE — 84443 ASSAY THYROID STIM HORMONE: CPT | Performed by: EMERGENCY MEDICINE

## 2025-02-12 RX ORDER — BUPRENORPHINE 2 MG/1
4 TABLET SUBLINGUAL ONCE
Status: COMPLETED | OUTPATIENT
Start: 2025-02-13 | End: 2025-02-13

## 2025-02-12 RX ORDER — CEPHALEXIN 500 MG/1
500 CAPSULE ORAL ONCE
Status: COMPLETED | OUTPATIENT
Start: 2025-02-13 | End: 2025-02-13

## 2025-02-12 RX ORDER — ACETAMINOPHEN 500 MG
1000 TABLET ORAL ONCE
Status: COMPLETED | OUTPATIENT
Start: 2025-02-12 | End: 2025-02-12

## 2025-02-12 RX ADMIN — ACETAMINOPHEN 1000 MG: 500 TABLET ORAL at 23:44

## 2025-02-12 RX ADMIN — SODIUM CHLORIDE 1000 ML: 9 INJECTION, SOLUTION INTRAVENOUS at 23:42

## 2025-02-12 ASSESSMENT — ACTIVITIES OF DAILY LIVING (ADL): ADLS_ACUITY_SCORE: 41

## 2025-02-13 ENCOUNTER — APPOINTMENT (OUTPATIENT)
Dept: GENERAL RADIOLOGY | Facility: CLINIC | Age: 35
End: 2025-02-13
Payer: COMMERCIAL

## 2025-02-13 ENCOUNTER — HOSPITAL ENCOUNTER (INPATIENT)
Facility: CLINIC | Age: 35
End: 2025-02-13
Attending: EMERGENCY MEDICINE | Admitting: STUDENT IN AN ORGANIZED HEALTH CARE EDUCATION/TRAINING PROGRAM
Payer: COMMERCIAL

## 2025-02-13 ENCOUNTER — APPOINTMENT (OUTPATIENT)
Dept: CT IMAGING | Facility: CLINIC | Age: 35
End: 2025-02-13
Attending: EMERGENCY MEDICINE
Payer: COMMERCIAL

## 2025-02-13 VITALS
OXYGEN SATURATION: 99 % | SYSTOLIC BLOOD PRESSURE: 110 MMHG | RESPIRATION RATE: 18 BRPM | DIASTOLIC BLOOD PRESSURE: 75 MMHG | TEMPERATURE: 98.6 F | HEART RATE: 100 BPM

## 2025-02-13 VITALS
HEART RATE: 103 BPM | TEMPERATURE: 98.2 F | DIASTOLIC BLOOD PRESSURE: 73 MMHG | BODY MASS INDEX: 23.48 KG/M2 | OXYGEN SATURATION: 100 % | SYSTOLIC BLOOD PRESSURE: 107 MMHG | RESPIRATION RATE: 14 BRPM | WEIGHT: 159 LBS

## 2025-02-13 DIAGNOSIS — J45.20 MILD INTERMITTENT ASTHMA WITHOUT COMPLICATION: ICD-10-CM

## 2025-02-13 DIAGNOSIS — L03.031 PARONYCHIA OF TOE, RIGHT: ICD-10-CM

## 2025-02-13 DIAGNOSIS — K59.03 CONSTIPATION DUE TO OPIOID THERAPY: ICD-10-CM

## 2025-02-13 DIAGNOSIS — F11.20 OPIOID USE DISORDER, SEVERE, DEPENDENCE (H): ICD-10-CM

## 2025-02-13 DIAGNOSIS — M05.741 RHEUMATOID ARTHRITIS INVOLVING BOTH HANDS WITH POSITIVE RHEUMATOID FACTOR (H): Primary | ICD-10-CM

## 2025-02-13 DIAGNOSIS — M05.742 RHEUMATOID ARTHRITIS INVOLVING BOTH HANDS WITH POSITIVE RHEUMATOID FACTOR (H): Primary | ICD-10-CM

## 2025-02-13 DIAGNOSIS — D64.9 ANEMIA, UNSPECIFIED TYPE: ICD-10-CM

## 2025-02-13 DIAGNOSIS — R11.2 INTRACTABLE NAUSEA AND VOMITING: ICD-10-CM

## 2025-02-13 DIAGNOSIS — T40.2X5A CONSTIPATION DUE TO OPIOID THERAPY: ICD-10-CM

## 2025-02-13 LAB
ALBUMIN SERPL BCG-MCNC: 3.9 G/DL (ref 3.5–5.2)
ALBUMIN SERPL BCG-MCNC: 3.9 G/DL (ref 3.5–5.2)
ALBUMIN UR-MCNC: NEGATIVE MG/DL
ALP SERPL-CCNC: 78 U/L (ref 40–150)
ALP SERPL-CCNC: 96 U/L (ref 40–150)
ALT SERPL W P-5'-P-CCNC: 11 U/L (ref 0–50)
ALT SERPL W P-5'-P-CCNC: 11 U/L (ref 0–50)
AMPHETAMINES UR QL SCN: ABNORMAL
ANION GAP SERPL CALCULATED.3IONS-SCNC: 12 MMOL/L (ref 7–15)
ANION GAP SERPL CALCULATED.3IONS-SCNC: 9 MMOL/L (ref 7–15)
APPEARANCE UR: CLEAR
AST SERPL W P-5'-P-CCNC: 14 U/L (ref 0–45)
AST SERPL W P-5'-P-CCNC: 18 U/L (ref 0–45)
ATRIAL RATE - MUSE: 88 BPM
BARBITURATES UR QL SCN: ABNORMAL
BASOPHILS # BLD AUTO: 0 10E3/UL (ref 0–0.2)
BASOPHILS NFR BLD AUTO: 0 %
BENZODIAZ UR QL SCN: ABNORMAL
BILIRUB SERPL-MCNC: 0.2 MG/DL
BILIRUB SERPL-MCNC: 0.3 MG/DL
BILIRUB UR QL STRIP: NEGATIVE
BUN SERPL-MCNC: 12.1 MG/DL (ref 6–20)
BUN SERPL-MCNC: 9.4 MG/DL (ref 6–20)
BZE UR QL SCN: ABNORMAL
CALCIUM SERPL-MCNC: 8.8 MG/DL (ref 8.8–10.4)
CALCIUM SERPL-MCNC: 9.1 MG/DL (ref 8.8–10.4)
CANNABINOIDS UR QL SCN: ABNORMAL
CHLORIDE SERPL-SCNC: 106 MMOL/L (ref 98–107)
CHLORIDE SERPL-SCNC: 99 MMOL/L (ref 98–107)
COLOR UR AUTO: YELLOW
CREAT SERPL-MCNC: 0.54 MG/DL (ref 0.51–0.95)
CREAT SERPL-MCNC: 0.55 MG/DL (ref 0.51–0.95)
CRP SERPL-MCNC: 17.19 MG/L
DIASTOLIC BLOOD PRESSURE - MUSE: NORMAL MMHG
EGFRCR SERPLBLD CKD-EPI 2021: >90 ML/MIN/1.73M2
EGFRCR SERPLBLD CKD-EPI 2021: >90 ML/MIN/1.73M2
EOSINOPHIL # BLD AUTO: 0.1 10E3/UL (ref 0–0.7)
EOSINOPHIL NFR BLD AUTO: 3 %
ERYTHROCYTE [DISTWIDTH] IN BLOOD BY AUTOMATED COUNT: 16.5 % (ref 10–15)
EST. AVERAGE GLUCOSE BLD GHB EST-MCNC: 114 MG/DL
ETHANOL SERPL-MCNC: <0.01 G/DL
FENTANYL UR QL: ABNORMAL
FLUAV RNA SPEC QL NAA+PROBE: NEGATIVE
FLUBV RNA RESP QL NAA+PROBE: NEGATIVE
GLUCOSE SERPL-MCNC: 109 MG/DL (ref 70–99)
GLUCOSE SERPL-MCNC: 110 MG/DL (ref 70–99)
GLUCOSE UR STRIP-MCNC: NEGATIVE MG/DL
HBA1C MFR BLD: 5.6 %
HBV SURFACE AB SERPL IA-ACNC: <3.5 M[IU]/ML
HBV SURFACE AB SERPL IA-ACNC: NONREACTIVE M[IU]/ML
HCG SERPL QL: NEGATIVE
HCG UR QL: NEGATIVE
HCO3 SERPL-SCNC: 27 MMOL/L (ref 22–29)
HCO3 SERPL-SCNC: 28 MMOL/L (ref 22–29)
HCT VFR BLD AUTO: 37.5 % (ref 35–47)
HGB BLD-MCNC: 12.2 G/DL (ref 11.7–15.7)
HGB UR QL STRIP: NEGATIVE
HIV 1+2 AB+HIV1 P24 AG SERPL QL IA: NONREACTIVE
IMM GRANULOCYTES # BLD: 0 10E3/UL
IMM GRANULOCYTES NFR BLD: 0 %
INTERPRETATION ECG - MUSE: NORMAL
KETONES UR STRIP-MCNC: NEGATIVE MG/DL
LEUKOCYTE ESTERASE UR QL STRIP: ABNORMAL
LIPASE SERPL-CCNC: 18 U/L (ref 13–60)
LIPASE SERPL-CCNC: 8 U/L (ref 13–60)
LYMPHOCYTES # BLD AUTO: 2.1 10E3/UL (ref 0.8–5.3)
LYMPHOCYTES NFR BLD AUTO: 42 %
MAGNESIUM SERPL-MCNC: 1.9 MG/DL (ref 1.7–2.3)
MAGNESIUM SERPL-MCNC: 2 MG/DL (ref 1.7–2.3)
MCH RBC QN AUTO: 26.5 PG (ref 26.5–33)
MCHC RBC AUTO-ENTMCNC: 32.5 G/DL (ref 31.5–36.5)
MCV RBC AUTO: 82 FL (ref 78–100)
MONOCYTES # BLD AUTO: 0.2 10E3/UL (ref 0–1.3)
MONOCYTES NFR BLD AUTO: 5 %
MUCOUS THREADS #/AREA URNS LPF: PRESENT /LPF
NEUTROPHILS # BLD AUTO: 2.5 10E3/UL (ref 1.6–8.3)
NEUTROPHILS NFR BLD AUTO: 50 %
NITRATE UR QL: NEGATIVE
NRBC # BLD AUTO: 0 10E3/UL
NRBC BLD AUTO-RTO: 0 /100
OPIATES UR QL SCN: ABNORMAL
P AXIS - MUSE: 37 DEGREES
PCP QUAL URINE (ROCHE): ABNORMAL
PH UR STRIP: 5.5 [PH] (ref 5–7)
PHOSPHATE SERPL-MCNC: 4.7 MG/DL (ref 2.5–4.5)
PLATELET # BLD AUTO: 290 10E3/UL (ref 150–450)
POTASSIUM SERPL-SCNC: 3.6 MMOL/L (ref 3.4–5.3)
POTASSIUM SERPL-SCNC: 3.6 MMOL/L (ref 3.4–5.3)
PR INTERVAL - MUSE: 144 MS
PROCALCITONIN SERPL IA-MCNC: 0.04 NG/ML
PROT SERPL-MCNC: 7.1 G/DL (ref 6.4–8.3)
PROT SERPL-MCNC: 7.2 G/DL (ref 6.4–8.3)
QRS DURATION - MUSE: 88 MS
QT - MUSE: 402 MS
QTC - MUSE: 486 MS
R AXIS - MUSE: -2 DEGREES
RBC # BLD AUTO: 4.6 10E6/UL (ref 3.8–5.2)
RBC URINE: 1 /HPF
RSV RNA SPEC NAA+PROBE: NEGATIVE
SARS-COV-2 RNA RESP QL NAA+PROBE: NEGATIVE
SODIUM SERPL-SCNC: 138 MMOL/L (ref 135–145)
SODIUM SERPL-SCNC: 143 MMOL/L (ref 135–145)
SP GR UR STRIP: 1.02 (ref 1–1.03)
SQUAMOUS EPITHELIAL: 7 /HPF
SYSTOLIC BLOOD PRESSURE - MUSE: NORMAL MMHG
T AXIS - MUSE: 36 DEGREES
T4 FREE SERPL-MCNC: 1.09 NG/DL (ref 0.9–1.7)
TROPONIN T SERPL HS-MCNC: 6 NG/L
TSH SERPL DL<=0.005 MIU/L-ACNC: 6.36 UIU/ML (ref 0.3–4.2)
UROBILINOGEN UR STRIP-MCNC: NORMAL MG/DL
VENTRICULAR RATE- MUSE: 88 BPM
VIT B12 SERPL-MCNC: 320 PG/ML (ref 232–1245)
WBC # BLD AUTO: 5.1 10E3/UL (ref 4–11)
WBC URINE: 4 /HPF

## 2025-02-13 PROCEDURE — 83690 ASSAY OF LIPASE: CPT | Performed by: EMERGENCY MEDICINE

## 2025-02-13 PROCEDURE — 84295 ASSAY OF SERUM SODIUM: CPT | Performed by: EMERGENCY MEDICINE

## 2025-02-13 PROCEDURE — 99285 EMERGENCY DEPT VISIT HI MDM: CPT | Performed by: EMERGENCY MEDICINE

## 2025-02-13 PROCEDURE — 258N000003 HC RX IP 258 OP 636

## 2025-02-13 PROCEDURE — 36415 COLL VENOUS BLD VENIPUNCTURE: CPT | Performed by: EMERGENCY MEDICINE

## 2025-02-13 PROCEDURE — 83735 ASSAY OF MAGNESIUM: CPT | Performed by: EMERGENCY MEDICINE

## 2025-02-13 PROCEDURE — 87086 URINE CULTURE/COLONY COUNT: CPT | Performed by: EMERGENCY MEDICINE

## 2025-02-13 PROCEDURE — 96360 HYDRATION IV INFUSION INIT: CPT | Performed by: EMERGENCY MEDICINE

## 2025-02-13 PROCEDURE — 250N000012 HC RX MED GY IP 250 OP 636 PS 637: Performed by: EMERGENCY MEDICINE

## 2025-02-13 PROCEDURE — 99207 PR APP CREDIT; MD BILLING SHARED VISIT: CPT | Mod: FS

## 2025-02-13 PROCEDURE — 250N000013 HC RX MED GY IP 250 OP 250 PS 637

## 2025-02-13 PROCEDURE — 82607 VITAMIN B-12: CPT

## 2025-02-13 PROCEDURE — 86706 HEP B SURFACE ANTIBODY: CPT

## 2025-02-13 PROCEDURE — 96361 HYDRATE IV INFUSION ADD-ON: CPT | Performed by: EMERGENCY MEDICINE

## 2025-02-13 PROCEDURE — 70450 CT HEAD/BRAIN W/O DYE: CPT

## 2025-02-13 PROCEDURE — 99223 1ST HOSP IP/OBS HIGH 75: CPT | Performed by: INTERNAL MEDICINE

## 2025-02-13 PROCEDURE — 82077 ASSAY SPEC XCP UR&BREATH IA: CPT | Performed by: EMERGENCY MEDICINE

## 2025-02-13 PROCEDURE — 86704 HEP B CORE ANTIBODY TOTAL: CPT

## 2025-02-13 PROCEDURE — 87389 HIV-1 AG W/HIV-1&-2 AB AG IA: CPT

## 2025-02-13 PROCEDURE — 250N000013 HC RX MED GY IP 250 OP 250 PS 637: Performed by: EMERGENCY MEDICINE

## 2025-02-13 PROCEDURE — 74018 RADEX ABDOMEN 1 VIEW: CPT

## 2025-02-13 PROCEDURE — 85004 AUTOMATED DIFF WBC COUNT: CPT | Performed by: EMERGENCY MEDICINE

## 2025-02-13 PROCEDURE — 83036 HEMOGLOBIN GLYCOSYLATED A1C: CPT

## 2025-02-13 PROCEDURE — 81003 URINALYSIS AUTO W/O SCOPE: CPT | Performed by: EMERGENCY MEDICINE

## 2025-02-13 PROCEDURE — 36415 COLL VENOUS BLD VENIPUNCTURE: CPT

## 2025-02-13 PROCEDURE — 80307 DRUG TEST PRSMV CHEM ANLYZR: CPT

## 2025-02-13 PROCEDURE — 258N000003 HC RX IP 258 OP 636: Performed by: EMERGENCY MEDICINE

## 2025-02-13 PROCEDURE — 84100 ASSAY OF PHOSPHORUS: CPT | Performed by: EMERGENCY MEDICINE

## 2025-02-13 PROCEDURE — 120N000002 HC R&B MED SURG/OB UMMC

## 2025-02-13 PROCEDURE — 250N000011 HC RX IP 250 OP 636: Performed by: EMERGENCY MEDICINE

## 2025-02-13 PROCEDURE — 73620 X-RAY EXAM OF FOOT: CPT | Mod: RT

## 2025-02-13 PROCEDURE — 84155 ASSAY OF PROTEIN SERUM: CPT | Performed by: EMERGENCY MEDICINE

## 2025-02-13 PROCEDURE — 99285 EMERGENCY DEPT VISIT HI MDM: CPT | Mod: 25 | Performed by: EMERGENCY MEDICINE

## 2025-02-13 PROCEDURE — 81025 URINE PREGNANCY TEST: CPT | Performed by: EMERGENCY MEDICINE

## 2025-02-13 RX ORDER — CLONIDINE HYDROCHLORIDE 0.1 MG/1
0.1 TABLET ORAL EVERY 6 HOURS PRN
Status: DISCONTINUED | OUTPATIENT
Start: 2025-02-13 | End: 2025-02-18 | Stop reason: HOSPADM

## 2025-02-13 RX ORDER — BUPROPION HYDROCHLORIDE 150 MG/1
150 TABLET ORAL EVERY MORNING
Status: CANCELLED | OUTPATIENT
Start: 2025-02-14

## 2025-02-13 RX ORDER — ONDANSETRON 4 MG/1
4 TABLET, ORALLY DISINTEGRATING ORAL EVERY 6 HOURS PRN
Status: DISCONTINUED | OUTPATIENT
Start: 2025-02-13 | End: 2025-02-13

## 2025-02-13 RX ORDER — ACETAMINOPHEN 650 MG/1
650 SUPPOSITORY RECTAL EVERY 4 HOURS PRN
Status: DISCONTINUED | OUTPATIENT
Start: 2025-02-13 | End: 2025-02-18 | Stop reason: HOSPADM

## 2025-02-13 RX ORDER — ONDANSETRON 4 MG/1
4 TABLET, ORALLY DISINTEGRATING ORAL ONCE
Status: COMPLETED | OUTPATIENT
Start: 2025-02-13 | End: 2025-02-13

## 2025-02-13 RX ORDER — BUPRENORPHINE 2 MG/1
8 TABLET SUBLINGUAL 2 TIMES DAILY
Status: DISCONTINUED | OUTPATIENT
Start: 2025-02-13 | End: 2025-02-13

## 2025-02-13 RX ORDER — CEPHALEXIN 500 MG/1
500 CAPSULE ORAL 2 TIMES DAILY
Status: DISCONTINUED | OUTPATIENT
Start: 2025-02-13 | End: 2025-02-18 | Stop reason: HOSPADM

## 2025-02-13 RX ORDER — FLUTICASONE PROPIONATE 110 UG/1
2 AEROSOL, METERED RESPIRATORY (INHALATION) 2 TIMES DAILY
Status: CANCELLED | OUTPATIENT
Start: 2025-02-13

## 2025-02-13 RX ORDER — CEPHALEXIN 500 MG/1
500 CAPSULE ORAL 2 TIMES DAILY
Qty: 14 CAPSULE | Refills: 0 | Status: ON HOLD | OUTPATIENT
Start: 2025-02-13 | End: 2025-02-17

## 2025-02-13 RX ORDER — PROCHLORPERAZINE MALEATE 5 MG/1
10 TABLET ORAL EVERY 6 HOURS PRN
Status: DISCONTINUED | OUTPATIENT
Start: 2025-02-13 | End: 2025-02-18 | Stop reason: HOSPADM

## 2025-02-13 RX ORDER — ONDANSETRON 8 MG/1
8 TABLET, ORALLY DISINTEGRATING ORAL EVERY 8 HOURS PRN
Qty: 10 TABLET | Refills: 0 | Status: ON HOLD | OUTPATIENT
Start: 2025-02-13 | End: 2025-02-17

## 2025-02-13 RX ORDER — LOPERAMIDE HYDROCHLORIDE 2 MG/1
2 CAPSULE ORAL 4 TIMES DAILY PRN
Status: DISCONTINUED | OUTPATIENT
Start: 2025-02-13 | End: 2025-02-18 | Stop reason: HOSPADM

## 2025-02-13 RX ORDER — ACETAMINOPHEN 325 MG/1
650 TABLET ORAL EVERY 4 HOURS PRN
Status: DISCONTINUED | OUTPATIENT
Start: 2025-02-13 | End: 2025-02-18 | Stop reason: HOSPADM

## 2025-02-13 RX ORDER — BUPRENORPHINE 8 MG/1
8 TABLET SUBLINGUAL 2 TIMES DAILY
Qty: 30 TABLET | Refills: 0 | Status: ON HOLD | OUTPATIENT
Start: 2025-02-13 | End: 2025-02-17

## 2025-02-13 RX ORDER — DICYCLOMINE HYDROCHLORIDE 10 MG/1
20 CAPSULE ORAL 3 TIMES DAILY PRN
Status: DISCONTINUED | OUTPATIENT
Start: 2025-02-13 | End: 2025-02-18 | Stop reason: HOSPADM

## 2025-02-13 RX ORDER — SERTRALINE HYDROCHLORIDE 25 MG/1
25 TABLET, FILM COATED ORAL DAILY
Status: DISCONTINUED | OUTPATIENT
Start: 2025-02-13 | End: 2025-02-13

## 2025-02-13 RX ORDER — AMOXICILLIN 250 MG
1 CAPSULE ORAL 2 TIMES DAILY PRN
Status: DISCONTINUED | OUTPATIENT
Start: 2025-02-13 | End: 2025-02-18 | Stop reason: HOSPADM

## 2025-02-13 RX ORDER — AMOXICILLIN 250 MG
2 CAPSULE ORAL 2 TIMES DAILY PRN
Status: DISCONTINUED | OUTPATIENT
Start: 2025-02-13 | End: 2025-02-18 | Stop reason: HOSPADM

## 2025-02-13 RX ORDER — ALBUTEROL SULFATE 90 UG/1
2 INHALANT RESPIRATORY (INHALATION) EVERY 4 HOURS PRN
Status: DISCONTINUED | OUTPATIENT
Start: 2025-02-13 | End: 2025-02-18 | Stop reason: HOSPADM

## 2025-02-13 RX ORDER — POLYETHYLENE GLYCOL 3350 17 G/17G
17 POWDER, FOR SOLUTION ORAL 2 TIMES DAILY PRN
Status: DISCONTINUED | OUTPATIENT
Start: 2025-02-13 | End: 2025-02-18 | Stop reason: HOSPADM

## 2025-02-13 RX ORDER — ACETAMINOPHEN 500 MG
1000 TABLET ORAL EVERY 8 HOURS PRN
Status: CANCELLED | OUTPATIENT
Start: 2025-02-13

## 2025-02-13 RX ORDER — BUPRENORPHINE AND NALOXONE 8; 2 MG/1; MG/1
1 FILM, SOLUBLE BUCCAL; SUBLINGUAL EVERY 24 HOURS
Status: DISCONTINUED | OUTPATIENT
Start: 2025-02-14 | End: 2025-02-14

## 2025-02-13 RX ORDER — ONDANSETRON 2 MG/ML
4 INJECTION INTRAMUSCULAR; INTRAVENOUS EVERY 6 HOURS PRN
Status: DISCONTINUED | OUTPATIENT
Start: 2025-02-13 | End: 2025-02-13

## 2025-02-13 RX ORDER — POLYETHYLENE GLYCOL 3350 17 G/17G
17 POWDER, FOR SOLUTION ORAL DAILY
Status: DISCONTINUED | OUTPATIENT
Start: 2025-02-13 | End: 2025-02-13

## 2025-02-13 RX ORDER — BUPRENORPHINE AND NALOXONE 2; .5 MG/1; MG/1
1 FILM, SOLUBLE BUCCAL; SUBLINGUAL
Status: DISCONTINUED | OUTPATIENT
Start: 2025-02-13 | End: 2025-02-14

## 2025-02-13 RX ADMIN — CEPHALEXIN 500 MG: 500 CAPSULE ORAL at 00:00

## 2025-02-13 RX ADMIN — CEPHALEXIN 500 MG: 500 CAPSULE ORAL at 19:38

## 2025-02-13 RX ADMIN — SODIUM CHLORIDE 1000 ML: 9 INJECTION, SOLUTION INTRAVENOUS at 09:58

## 2025-02-13 RX ADMIN — SODIUM CHLORIDE, POTASSIUM CHLORIDE, SODIUM LACTATE AND CALCIUM CHLORIDE 1000 ML: 600; 310; 30; 20 INJECTION, SOLUTION INTRAVENOUS at 19:37

## 2025-02-13 RX ADMIN — ONDANSETRON 4 MG: 4 TABLET, ORALLY DISINTEGRATING ORAL at 06:51

## 2025-02-13 RX ADMIN — BUPRENORPHINE 4 MG: 2 TABLET SUBLINGUAL at 00:00

## 2025-02-13 RX ADMIN — ONDANSETRON 4 MG: 4 TABLET, ORALLY DISINTEGRATING ORAL at 15:43

## 2025-02-13 ASSESSMENT — ACTIVITIES OF DAILY LIVING (ADL)
ADLS_ACUITY_SCORE: 41

## 2025-02-13 ASSESSMENT — COLUMBIA-SUICIDE SEVERITY RATING SCALE - C-SSRS
1. IN THE PAST MONTH, HAVE YOU WISHED YOU WERE DEAD OR WISHED YOU COULD GO TO SLEEP AND NOT WAKE UP?: NO
2. HAVE YOU ACTUALLY HAD ANY THOUGHTS OF KILLING YOURSELF IN THE PAST MONTH?: NO
6. HAVE YOU EVER DONE ANYTHING, STARTED TO DO ANYTHING, OR PREPARED TO DO ANYTHING TO END YOUR LIFE?: NO

## 2025-02-13 ASSESSMENT — LIFESTYLE VARIABLES: TOTAL_SCORE: 5

## 2025-02-13 NOTE — ED NOTES
Pt appearing more awake and able to talk and follow commands. Pt A&O x4, ambulatory with steady gait. VSS. Pt given discharge paperwork and prescriptions. No questions or concerns at this time. PA walking pt to instameds to collect prescriptions. Pt requesting taxi then changed mind. RN offered bus token which pt took.

## 2025-02-13 NOTE — ED PROVIDER NOTES
"    [facility] [place] [facility]ERGENCY DEPARTMENT ([facility])    2/12/25          History     Chief Complaint   Patient presents with    Foot Pain     Patient reports all over body pain, \"especially in my feet,\" patient reports this pain to be chronic.      HPI  Juana Madrigal is a 34 year old female who with PMH of asthma, rheumatoid arthritis, GERD, allergic rhinitis, ADHD, alcohol use disorder, and opioid dependence presents to the ED due to foot pain.     Patient has seropositive rheumatoid arthritis. I have reviewed rheumatology office visit chart on February 26, 2024. Patient also has opiate use disorder and is undomiciled living in shelter. She has been off methotrexate and prednisone approximately 2 months and most recently has been seen multiple times in emergency departments over the past 2 months including for opiate overdoses. She presents today for multiple physical complaints including generally not feeling well, nausea, chronic pain, bilateral foot pain. She has not smoked fentanyl for 4 days and has been progressively feeling worse.  This part of the medical record was transcribed by VICKIE PA Medical Scribe, from a dictation done by Josh De La Rosa MD.      Past Medical History  Past Medical History:   Diagnosis Date    Arthritis     GERD (gastroesophageal reflux disease)     Rheumatoid arthritis (H)     Uncomplicated asthma      No past surgical history on file.  acetaminophen (ACETAMINOPHEN EXTRA STRENGTH) 500 MG tablet  bictegravir-emtricitabine-tenofovir (BIKTARVY) -25 MG per tablet  buprenorphine-naloxone (SUBOXONE) 2-0.5 MG SUBL sublingual tablet  levothyroxine (SYNTHROID/LEVOTHROID) 25 MCG tablet  METHOTREXATE PO  predniSONE (DELTASONE) 10 MG tablet  albuterol (ALBUTEROL) 108 (90 BASE) MCG/ACT Inhaler  albuterol (PROAIR HFA/PROVENTIL HFA/VENTOLIN HFA) 108 (90 BASE) MCG/ACT Inhaler  buPROPion (WELLBUTRIN XL) 150 MG 24 hr tablet  fluticasone (FLOVENT HFA) 110 MCG/ACT " Inhaler  naloxone (NARCAN) 4 MG/0.1ML nasal spray  polyethylene glycol (MIRALAX) 17 GM/Dose powder  sertraline (ZOLOFT) 25 MG tablet      No Known Allergies  Family History  No family history on file.  Social History   Social History     Tobacco Use    Smoking status: Some Days    Smokeless tobacco: Never    Tobacco comments:     Occasional Hookah use (tobacco)    Substance Use Topics    Alcohol use: Not Currently    Drug use: Yes     Types: Marijuana     Comment: THC vape occasionally      Past medical history, past surgical history, medications, allergies, family history, and social history were reviewed with the patient. No additional pertinent items.   A complete review of systems was performed with pertinent positives and negatives noted in the HPI, and all other systems negative.    Physical Exam   BP: 115/86  Pulse: 87  Temp: 98.1  F (36.7  C)  Resp: 20  Weight: 72.1 kg (159 lb)  SpO2: 99 %  Physical Exam  Constitutional:       Comments: Chronically ill-appearing appears uncomfortable and not vomiting.      Cardiovascular:      Comments: Pulse is regular.   Abdominal:      Comments: Nontender and soft.   Musculoskeletal:      Comments: Feet with mild amount of waterlogged tissue in bilateral soles, however no focal erythema or tenderness, skin breakdown over skin of dorsal left fifth toe nail break.           ED Course, Procedures, & Data      Procedures       -----  Initiation of Medication for the Treatment of Opioid Use Disorder (OUD) in the Emergency Department (ED)  HCPCS code      Assessment:   Opioid(s) used: fentanyl   Amount: varies  Frequency: daily - weekly  Route: smoked  Duration: months - years  Last use: 4 days ago    Other substance(s) with ongoing use: n/a    The patient meets the following DSM-V criteria for Opioid Use Disorder (OUD):   Taking more than was intended  Persistent desire or unsuccessful efforts to cut down  Time spent in activities necessary to obtain, use, and  recover  Craving  Failure to fulfill obligations at work, school, or home  Continued use despite causing social or interpersonal problems   Reduced social, occupational, or recreational activities    (Severity: Mild: 2-3 criteria, Moderate: 4-5 criteria. Severe: 6 or more criteria)  Based on my assessment, the patient has Moderate OUD.     Medication Initiated in the ED:  In the ED, treatment for OUD was initiated. The patient was given 1 dose(s) of 24 mg subcutaneous buprenorphine while in the ED.     Upon ED discharge, outpatient prescription treatment for OUD was initiated.   The patient was prescribed Suboxone and naloxone.      Referral to Ongoing Care and Supportive Services:   Upon ED discharge, the patient was referred to Addiction Medicine for ongoing care and supportive services. The patient was also provided with information on community resources for various supportive services for OUD, and advised to return to the ED if having worsening symptoms.   -----         Results for orders placed or performed during the hospital encounter of 02/12/25   Comprehensive metabolic panel     Status: Abnormal   Result Value Ref Range    Sodium 138 135 - 145 mmol/L    Potassium 3.6 3.4 - 5.3 mmol/L    Carbon Dioxide (CO2) 27 22 - 29 mmol/L    Anion Gap 12 7 - 15 mmol/L    Urea Nitrogen 12.1 6.0 - 20.0 mg/dL    Creatinine 0.54 0.51 - 0.95 mg/dL    GFR Estimate >90 >60 mL/min/1.73m2    Calcium 9.1 8.8 - 10.4 mg/dL    Chloride 99 98 - 107 mmol/L    Glucose 109 (H) 70 - 99 mg/dL    Alkaline Phosphatase 96 40 - 150 U/L    AST 18 0 - 45 U/L    ALT 11 0 - 50 U/L    Protein Total 7.2 6.4 - 8.3 g/dL    Albumin 3.9 3.5 - 5.2 g/dL    Bilirubin Total 0.2 <=1.2 mg/dL   Lipase     Status: Normal   Result Value Ref Range    Lipase 18 13 - 60 U/L   Procalcitonin     Status: Normal   Result Value Ref Range    Procalcitonin 0.04 <0.50 ng/mL   Troponin T, High Sensitivity     Status: Normal   Result Value Ref Range    Troponin T, High  Sensitivity 6 <=14 ng/L   Magnesium     Status: Normal   Result Value Ref Range    Magnesium 1.9 1.7 - 2.3 mg/dL   TSH with free T4 reflex     Status: Abnormal   Result Value Ref Range    TSH 6.36 (H) 0.30 - 4.20 uIU/mL   CRP inflammation     Status: Abnormal   Result Value Ref Range    CRP Inflammation 17.19 (H) <5.00 mg/L   Erythrocyte sedimentation rate auto     Status: Abnormal   Result Value Ref Range    Erythrocyte Sedimentation Rate 26 (H) 0 - 20 mm/hr   HCG qualitative Blood     Status: Normal   Result Value Ref Range    hCG Serum Qualitative Negative Negative   CBC with platelets and differential     Status: Abnormal   Result Value Ref Range    WBC Count 5.4 4.0 - 11.0 10e3/uL    RBC Count 4.49 3.80 - 5.20 10e6/uL    Hemoglobin 11.7 11.7 - 15.7 g/dL    Hematocrit 35.5 35.0 - 47.0 %    MCV 79 78 - 100 fL    MCH 26.1 (L) 26.5 - 33.0 pg    MCHC 33.0 31.5 - 36.5 g/dL    RDW 16.2 (H) 10.0 - 15.0 %    Platelet Count 292 150 - 450 10e3/uL    % Neutrophils 57 %    % Lymphocytes 36 %    % Monocytes 4 %    % Eosinophils 3 %    % Basophils 0 %    % Immature Granulocytes 0 %    NRBCs per 100 WBC 0 <1 /100    Absolute Neutrophils 3.1 1.6 - 8.3 10e3/uL    Absolute Lymphocytes 2.0 0.8 - 5.3 10e3/uL    Absolute Monocytes 0.2 0.0 - 1.3 10e3/uL    Absolute Eosinophils 0.2 0.0 - 0.7 10e3/uL    Absolute Basophils 0.0 0.0 - 0.2 10e3/uL    Absolute Immature Granulocytes 0.0 <=0.4 10e3/uL    Absolute NRBCs 0.0 10e3/uL   CBC with platelets differential     Status: Abnormal    Narrative    The following orders were created for panel order CBC with platelets differential.  Procedure                               Abnormality         Status                     ---------                               -----------         ------                     CBC with platelets and d...[[id number]]  Abnormal            Final result                 Please view results for these tests on the individual orders.     Medications   sodium chloride 0.9%  BOLUS 1,000 mL (1,000 mLs Intravenous $New Bag 2/12/25 2342)   buprenorphine ER (BRIXADI WEEKLY) 24 MG/0.48ML subcutaneous injection 24 mg (has no administration in time range)   acetaminophen (TYLENOL) tablet 1,000 mg (1,000 mg Oral $Given 2/12/25 2344)   cephALEXin (KEFLEX) capsule 500 mg (500 mg Oral $Given 2/13/25 0000)   buprenorphine (SUBUTEX) sublingual tablet 4 mg (4 mg Sublingual $Given 2/13/25 0000)     Labs Ordered and Resulted from Time of ED Arrival to Time of ED Departure   COMPREHENSIVE METABOLIC PANEL - Abnormal       Result Value    Sodium 138      Potassium 3.6      Carbon Dioxide (CO2) 27      Anion Gap 12      Urea Nitrogen 12.1      Creatinine 0.54      GFR Estimate >90      Calcium 9.1      Chloride 99      Glucose 109 (*)     Alkaline Phosphatase 96      AST 18      ALT 11      Protein Total 7.2      Albumin 3.9      Bilirubin Total 0.2     TSH WITH FREE T4 REFLEX - Abnormal    TSH 6.36 (*)    CRP INFLAMMATION - Abnormal    CRP Inflammation 17.19 (*)    ERYTHROCYTE SEDIMENTATION RATE AUTO - Abnormal    Erythrocyte Sedimentation Rate 26 (*)    CBC WITH PLATELETS AND DIFFERENTIAL - Abnormal    WBC Count 5.4      RBC Count 4.49      Hemoglobin 11.7      Hematocrit 35.5      MCV 79      MCH 26.1 (*)     MCHC 33.0      RDW 16.2 (*)     Platelet Count 292      % Neutrophils 57      % Lymphocytes 36      % Monocytes 4      % Eosinophils 3      % Basophils 0      % Immature Granulocytes 0      NRBCs per 100 WBC 0      Absolute Neutrophils 3.1      Absolute Lymphocytes 2.0      Absolute Monocytes 0.2      Absolute Eosinophils 0.2      Absolute Basophils 0.0      Absolute Immature Granulocytes 0.0      Absolute NRBCs 0.0     LIPASE - Normal    Lipase 18     PROCALCITONIN - Normal    Procalcitonin 0.04     TROPONIN T, HIGH SENSITIVITY - Normal    Troponin T, High Sensitivity 6     MAGNESIUM - Normal    Magnesium 1.9     HCG QUALITATIVE PREGNANCY - Normal    hCG Serum Qualitative Negative     INFLUENZA  A/B, RSV AND SARS-COV2 PCR   T4 FREE     No orders to display          Critical care was not performed.     Medical Decision Making  The patient's presentation was of high complexity (a chronic illness severe exacerbation, progression, or side effect of treatment).    The patient's evaluation involved:  ordering and/or review of 2 test(s) in this encounter (see separate area of note for details)  discussion of management or test interpretation with another health professional (substance use navigator)    The patient's management necessitated high risk (a decision regarding hospitalization).    Assessment & Plan    Opiate use disorder without use x 4 days with opioid withdrawal, COWS score 4 or greater, fifth toe with early cellulitis, rheumatoid arthritis; off medications. We will rule off viral URI, organ failure or electrolyte abnormality, treat with Subutex and injectable Brixadi which patient agrees to Tylenol, IV fluids and Keflex for early soft tissue toe infection.    I have reviewed the nursing notes. I have reviewed the findings, diagnosis, plan and need for follow up with the patient.    New Prescriptions    No medications on file       Final diagnoses:   Rheumatoid arthritis, involving unspecified site, unspecified whether rheumatoid factor present (H)   Opioid use disorder   Opioid dependence with withdrawal (H)       Josh De La Rosa MD.   [facility] EMERGENCY DEPARTMENT  2/12/2025     Josh De La Rosa MD  02/13/25 0023

## 2025-02-13 NOTE — H&P
[facility]    History and Physical - Hospitalist Service, GOLD TEAM        Date of Admission:  2/13/2025    Assessment & Plan    Juana Chago Madrigal is a 34 year old female admitted on 2/13/2025. She has a past medical history of asthma, rheumatoid arthritis, GERD, allergic rhinitis, depression. ADHD, alcohol use disorder, and opioid use disorder. She presented to the ED with nausea and altered mental status; overall picture concerning for drug induced encephalopathy with likely opioid withdrawal. Was admitted to internal medicine for further assessment and monitoring.     Per chart review, patient was seen 2/12/25 at [facility] ED for multiple physical complaints including nausea, chronic pain, and bilateral foot pain.  She reported 4 days since last fentanyl use and was treated with Subutex, Brixadi, and Keflex for early soft tissue infection of toe.   ___________________________    # Altered mental status, Concern for drug induced encephalopathy   # Concern for opioid withdrawal   # Polysubstance use disorder (meth, opioid, hx alcohol use)  # Nausea, vomiting   Presented to the ED complaining of abdominal pain and nausea. On exam for ED provider, was found to be slow to answer and drowsy. Abdomen nontender and soft. With history of meth and opioid use, last use of fentanyl 2/08/25 and denies use since- with hx of recent opioid overdoses 1/09/25, 2/05/25 with subsequent ED visits. Endorses she was given beans and rice by a man and that she feels it could have been laced with meth but denies ingestion of meth on purpose. UDS with fentanyl and amphetamines. Also resented to ED 2/12/25 with nausea and vomiting; was given brixadi (weekly) and sublingual buprenorphine. Pupils pinpoint on exam and vomiting at bedside concerning for opioid withdrawal. She HDS on room air and alert and oriented.   - No zofran given hx of prolonged qtc, PRN compazine    -  "Suboxone initiation order set utilized; COWs scoring   - CT head ordered by ED - negative for acute intracranial hemorrhage   - Continue IV fluids   - AM CBC, CMP  - Fall precautions, delirium precautions   - PRN senna and miralax (mild stool burden on ab xray), PRN bentyl, PRN clonidine   - Chem dep, addiction medicine consulted     # Infection of left 5th toe   Presented to ED 2/12/25 and noted painful pinky toe of right foot. On exam, note 1 cm lesion over left pinky toe with purulent drainage and swelling. Erythema noted. Started 500 mg BID for 7 days on ED visit 2/12 but she did not continue taking. No fevers or chills and she is HDS.   - R foot xray ruled out osteonecrosis   - Continue kephlex x 7 days   - Wound cares    # Rheumatoid arthritis   # Bilateral foot pain   # Peripheral neuropathy   On arrival, endorsed \"pins-and-needles\" sensation in hands and feet which has been going on for over a month. Presented Known hx of RA and has not been taking her home medications (methotrexate and prednisone). 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 Previously on humira but this was discontinued given COVID concerns in 2022. Last plan was to continue methotrexate 10 mg, enbrel injection weekly, and prednisone - she was last seen 2/26/24 by Dr. Christina.   - Can consider gabapentin PRN once less somnolent   - b12, A1C ordered to assess etiology of peripheral neuropathy   - Rheumatology consulted     # Asthma   - PRN albuterol ordered     # Allergic rhinitis   - No longer taking flonase     # Depression  # ADHD   - No longer taking wellbutrin or sertraline     # GERD   - not on pta medication management     # Hypothyroidism   Not taking pta medication. Most recent T4 2/12/25 within normal limits.   - Not taking levothyroxine     # Experiencing housing insecurity  - SW consulted             Diet:  advance diet as " "tolerated to Reg adult diet   DVT Prophylaxis: Pneumatic Compression Devices  [place] Catheter: Not present  Lines: None     Cardiac Monitoring: None  Code Status:  full code     Clinically Significant Risk Factors Present on Admission                                        Disposition Plan     Medically Ready for Discharge: Anticipated Tomorrow         The patient's care was discussed with the Bedside Nurse and Patient.    Will Zapata PA-C  Hospitalist Service, GOLD TEAM   [facility]  Securely message with [employer] (more info)  Text page via [employer] Paging/Directory   See signed in provider for up to date coverage information    ______________________________________________________________________    Chief Complaint   Nausea      History is obtained from the patient    History of Present Illness   Juana Madrigal is a 34 year old female with a history of GERD, alcohol use disorder, opioid use disorder, depression, housing insecurity, and rheumatoid arthritis who presents to the emergency department for evaluation of  nausea and drowsiness.      In ED room, patient is slow to answer questions and appears drowsy.  She states that she started experiencing abdominal pain and vomiting this morning.   Patient also notes she has been feeling a \"pins-and-needles\" like sensation and numbness on her upper and lower extremities which started over a month ago but she can't specify further.  Patient thinks she might have food poisoning as someone gave her food today - she was given rice and beans by a man and wonders if it was laced with meth.  She denies alcohol use. Denies chest pain, SOB.      Per ED chart review,   \"Per chart review, patient was seen yesterday at [facility] ED for multiple physical complaints including nausea, chronic pain, and bilateral foot pain.  She reported 4 days since last fentanyl use and was treated with Subutex, Brixadi, and Keflex for " "early soft tissue infection of toe.\"      Past Medical History    Past Medical History:   Diagnosis Date    Arthritis     GERD (gastroesophageal reflux disease)     Rheumatoid arthritis (H)     Uncomplicated asthma        Past Surgical History   No past surgical history on file.    Prior to Admission Medications   Prior to Admission Medications   Prescriptions Last Dose Informant Patient Reported? Taking?   METHOTREXATE PO   Yes No   Sig: Take by mouth once a week   acetaminophen (ACETAMINOPHEN EXTRA STRENGTH) 500 MG tablet   No No   Sig: Take 2 tablets (1,000 mg) by mouth every 8 hours as needed for fever or pain   albuterol (ALBUTEROL) 108 (90 BASE) MCG/ACT Inhaler   No No   Sig: Inhale 2 puffs into the lungs every 4 hours as needed for shortness of breath / dyspnea   albuterol (PROAIR HFA/PROVENTIL HFA/VENTOLIN HFA) 108 (90 BASE) MCG/ACT Inhaler   No No   Sig: Inhale 2 puffs into the lungs every 4 hours as needed for shortness of breath / dyspnea or wheezing   bictegravir-emtricitabine-tenofovir (BIKTARVY) -25 MG per tablet   No No   Sig: Take 1 tablet by mouth daily.   buPROPion (WELLBUTRIN XL) 150 MG 24 hr tablet   No No   Sig: Take 1 tablet (150 mg) by mouth every morning.   buprenorphine (SUBUTEX) 8 MG SUBL sublingual tablet   No No   Sig: Place 1 tablet (8 mg) under the tongue 2 times daily for 15 days.   buprenorphine-naloxone (SUBOXONE) 2-0.5 MG SUBL sublingual tablet   No No   Sig: Place 1 tablet under the tongue 2 times daily.   cephALEXin (KEFLEX) 500 MG capsule   No No   Sig: Take 1 capsule (500 mg) by mouth 2 times daily for 7 days.   fluticasone (FLOVENT HFA) 110 MCG/ACT Inhaler   No No   Sig: Inhale 2 puffs into the lungs 2 times daily   levothyroxine (SYNTHROID/LEVOTHROID) 25 MCG tablet   Yes No   naloxone (NARCAN) 4 MG/0.1ML nasal spray   No No   Sig: Spray 1 spray (4 mg) into one nostril alternating nostrils as needed for opioid reversal every 2-3 minutes until assistance arrives   naloxone " (NARCAN) 4 MG/0.1ML nasal spray   No No   Sig: Spray 1 spray (4 mg) into one nostril alternating nostrils as needed for opioid reversal. every 2-3 minutes until assistance arrives   ondansetron (ZOFRAN ODT) 8 MG ODT tab   No No   Sig: Take 1 tablet (8 mg) by mouth every 8 hours as needed for nausea.   polyethylene glycol (MIRALAX) 17 GM/Dose powder   No No   Sig: Take 17 g (1 Capful) by mouth daily.   predniSONE (DELTASONE) 10 MG tablet   Yes No   sertraline (ZOLOFT) 25 MG tablet   Yes No   Sig: Take 25 mg by mouth      Facility-Administered Medications: None           Physical Exam   Vital Signs: Temp: 97.4  F (36.3  C) Temp src: Oral BP: 103/61 Pulse: 85   Resp: 18 SpO2: 98 % O2 Device: None (Room air)    Weight: 0 lbs 0 oz    General: Somnolent but alert.  Sleeps but arouses to voice and answers all questions appropriately, disheveled and not well kempt   Head: normal cephalic  HEENT: pupils equal and constricted  Neck: Supple  CV: regular rate and rhythm without murmur  Lungs: clear to auscultation  Abd: soft, non-tender, no guarding, no peritoneal signs, no rebound   EXT: lower extremities without swelling or edema  Right 5th toe: 1 cm purulent lesion over dorsal surface, toe swollen and warm, erythema surrounding lesion, no surrounding rashes noted   Neuro: Somnolent however answers questions appropriately. No focal deficits noted; difficult to get patient to participate meaningfully in physical exam but does appear to be moving all body parts equally; Does respond to  strength testing and cranial nerve testing that appear intact but patient needs a lot of coaching and encouragement to stay engaged though exam is symmetric. CN 2-12 grossly intact. Alert and oriented x 3 when she is awake.     Medical Decision Making       80 MINUTES SPENT BY ME on the date of service doing chart review, history, exam, documentation & further activities per the note.      Data   Recent Labs   Lab 02/13/25  0948 02/12/25  8950    WBC 5.1 5.4   HGB 12.2 11.7   MCV 82 79    292    138   POTASSIUM 3.6 3.6   CHLORIDE 106 99   CO2 28 27   BUN 9.4 12.1   CR 0.55 0.54   ANIONGAP 9 12   CLARITA 8.8 9.1   * 109*   ALBUMIN 3.9 3.9   PROTTOTAL 7.1 7.2   BILITOTAL 0.3 0.2   ALKPHOS 78 96   ALT 11 11   AST 14 18   LIPASE 8* 18

## 2025-02-13 NOTE — ED PROVIDER NOTES
"ED Provider Note  [facility]      History     Chief Complaint   Patient presents with    Abdominal Pain     Pt states she's been having abdominal pain, feeling nauseous and dizzy. Had diarrhea last night.     HPI  Juana Madrigal is a 34 year old female with a history of GERD, alcohol use disorder, opioid use disorder, depression, housing insecurity, and rheumatoid arthritis who presents to the emergency department for evaluation of abdominal pain and nausea.  In ED room, patient is slow to answer questions and appears drowsy.  She states that she started experiencing abdominal pain and vomiting this morning.  She also endorses dizziness when walking and was unable to further clarify if she was feeling faint or feeling vertiginous like symptoms.  Patient also notes she has been feeling a \"pins-and-needles\" like sensation and numbness on her skin.  She is unsure if the sensation is unilateral or bilateral or where the sensation is.  Patient thinks she might have food poisoning as someone gave her food today.  She denies alcohol use.    Per chart review, patient was seen yesterday at [facility] ED for multiple physical complaints including nausea, chronic pain, and bilateral foot pain.  She reported 4 days since last fentanyl use and was treated with Subutex, Brixadi, and Keflex for early soft tissue infection of toe.      Past Medical History  Past Medical History:   Diagnosis Date    Arthritis     GERD (gastroesophageal reflux disease)     Rheumatoid arthritis (H)     Uncomplicated asthma      No past surgical history on file.  acetaminophen (ACETAMINOPHEN EXTRA STRENGTH) 500 MG tablet  albuterol (ALBUTEROL) 108 (90 BASE) MCG/ACT Inhaler  albuterol (PROAIR HFA/PROVENTIL HFA/VENTOLIN HFA) 108 (90 BASE) MCG/ACT Inhaler  bictegravir-emtricitabine-tenofovir (BIKTARVY) -25 MG per tablet  buprenorphine (SUBUTEX) 8 MG SUBL sublingual tablet  buprenorphine-naloxone " (SUBOXONE) 2-0.5 MG SUBL sublingual tablet  buPROPion (WELLBUTRIN XL) 150 MG 24 hr tablet  cephALEXin (KEFLEX) 500 MG capsule  fluticasone (FLOVENT HFA) 110 MCG/ACT Inhaler  levothyroxine (SYNTHROID/LEVOTHROID) 25 MCG tablet  METHOTREXATE PO  naloxone (NARCAN) 4 MG/0.1ML nasal spray  naloxone (NARCAN) 4 MG/0.1ML nasal spray  ondansetron (ZOFRAN ODT) 8 MG ODT tab  polyethylene glycol (MIRALAX) 17 GM/Dose powder  predniSONE (DELTASONE) 10 MG tablet  sertraline (ZOLOFT) 25 MG tablet      No Known Allergies  Family History  No family history on file.  Social History   Social History     Tobacco Use    Smoking status: Some Days    Smokeless tobacco: Never    Tobacco comments:     Occasional Hookah use (tobacco)    Substance Use Topics    Alcohol use: Not Currently    Drug use: Yes     Types: Marijuana     Comment: THC vape occasionally      A medically appropriate review of systems was performed with pertinent positives and negatives noted in the HPI, and all other systems negative.    Physical Exam   BP: 103/61  Pulse: 85  Temp: 97.4  F (36.3  C)  Resp: 18  SpO2: 98 %  Physical Exam  General: Somnolent.  Sleeps but arouses to voice and answers most questions appropriately  Head: normal cephalic  HEENT: pupils equal, conjugate gaze intact.    Neck: Supple  CV: regular rate and rhythm without murmur  Lungs: clear to auscultation  Abd: soft, non-tender, no guarding, no peritoneal signs  EXT: lower extremities without swelling or edema  Neuro: Somnolent however answers questions appropriately. No focal deficits noted; difficult to get patient to participate meaningfully in physical exam but does appear to be moving all body parts equally; Does respond to  strength testing and cranial nerve testing that appear intact but patient needs a lot of coaching and encouragement to stay engaged though exam is symmetric         ED Course, Procedures, & Data      Procedures            Results for orders placed or performed during the  hospital encounter of 02/13/25   Comprehensive metabolic panel     Status: Abnormal   Result Value Ref Range    Sodium 143 135 - 145 mmol/L    Potassium 3.6 3.4 - 5.3 mmol/L    Carbon Dioxide (CO2) 28 22 - 29 mmol/L    Anion Gap 9 7 - 15 mmol/L    Urea Nitrogen 9.4 6.0 - 20.0 mg/dL    Creatinine 0.55 0.51 - 0.95 mg/dL    GFR Estimate >90 >60 mL/min/1.73m2    Calcium 8.8 8.8 - 10.4 mg/dL    Chloride 106 98 - 107 mmol/L    Glucose 110 (H) 70 - 99 mg/dL    Alkaline Phosphatase 78 40 - 150 U/L    AST 14 0 - 45 U/L    ALT 11 0 - 50 U/L    Protein Total 7.1 6.4 - 8.3 g/dL    Albumin 3.9 3.5 - 5.2 g/dL    Bilirubin Total 0.3 <=1.2 mg/dL   Lipase     Status: Abnormal   Result Value Ref Range    Lipase 8 (L) 13 - 60 U/L   CBC with platelets and differential     Status: Abnormal   Result Value Ref Range    WBC Count 5.1 4.0 - 11.0 10e3/uL    RBC Count 4.60 3.80 - 5.20 10e6/uL    Hemoglobin 12.2 11.7 - 15.7 g/dL    Hematocrit 37.5 35.0 - 47.0 %    MCV 82 78 - 100 fL    MCH 26.5 26.5 - 33.0 pg    MCHC 32.5 31.5 - 36.5 g/dL    RDW 16.5 (H) 10.0 - 15.0 %    Platelet Count 290 150 - 450 10e3/uL    % Neutrophils 50 %    % Lymphocytes 42 %    % Monocytes 5 %    % Eosinophils 3 %    % Basophils 0 %    % Immature Granulocytes 0 %    NRBCs per 100 WBC 0 <1 /100    Absolute Neutrophils 2.5 1.6 - 8.3 10e3/uL    Absolute Lymphocytes 2.1 0.8 - 5.3 10e3/uL    Absolute Monocytes 0.2 0.0 - 1.3 10e3/uL    Absolute Eosinophils 0.1 0.0 - 0.7 10e3/uL    Absolute Basophils 0.0 0.0 - 0.2 10e3/uL    Absolute Immature Granulocytes 0.0 <=0.4 10e3/uL    Absolute NRBCs 0.0 10e3/uL   Alcohol level blood     Status: Normal   Result Value Ref Range    Alcohol ethyl <0.01 <=0.01 g/dL   Magnesium     Status: Normal   Result Value Ref Range    Magnesium 2.0 1.7 - 2.3 mg/dL   Phosphorus     Status: Abnormal   Result Value Ref Range    Phosphorus 4.7 (H) 2.5 - 4.5 mg/dL   CBC with platelets differential     Status: Abnormal    Narrative    The following orders  were created for panel order CBC with platelets differential.  Procedure                               Abnormality         Status                     ---------                               -----------         ------                     CBC with platelets and d...[[id number]]  Abnormal            Final result                 Please view results for these tests on the individual orders.   Results for orders placed or performed during the hospital encounter of 02/12/25   Comprehensive metabolic panel     Status: Abnormal   Result Value Ref Range    Sodium 138 135 - 145 mmol/L    Potassium 3.6 3.4 - 5.3 mmol/L    Carbon Dioxide (CO2) 27 22 - 29 mmol/L    Anion Gap 12 7 - 15 mmol/L    Urea Nitrogen 12.1 6.0 - 20.0 mg/dL    Creatinine 0.54 0.51 - 0.95 mg/dL    GFR Estimate >90 >60 mL/min/1.73m2    Calcium 9.1 8.8 - 10.4 mg/dL    Chloride 99 98 - 107 mmol/L    Glucose 109 (H) 70 - 99 mg/dL    Alkaline Phosphatase 96 40 - 150 U/L    AST 18 0 - 45 U/L    ALT 11 0 - 50 U/L    Protein Total 7.2 6.4 - 8.3 g/dL    Albumin 3.9 3.5 - 5.2 g/dL    Bilirubin Total 0.2 <=1.2 mg/dL   Lipase     Status: Normal   Result Value Ref Range    Lipase 18 13 - 60 U/L   Procalcitonin     Status: Normal   Result Value Ref Range    Procalcitonin 0.04 <0.50 ng/mL   Troponin T, High Sensitivity     Status: Normal   Result Value Ref Range    Troponin T, High Sensitivity 6 <=14 ng/L   Magnesium     Status: Normal   Result Value Ref Range    Magnesium 1.9 1.7 - 2.3 mg/dL   TSH with free T4 reflex     Status: Abnormal   Result Value Ref Range    TSH 6.36 (H) 0.30 - 4.20 uIU/mL   CRP inflammation     Status: Abnormal   Result Value Ref Range    CRP Inflammation 17.19 (H) <5.00 mg/L   Erythrocyte sedimentation rate auto     Status: Abnormal   Result Value Ref Range    Erythrocyte Sedimentation Rate 26 (H) 0 - 20 mm/hr   HCG qualitative Blood     Status: Normal   Result Value Ref Range    hCG Serum Qualitative Negative Negative   Influenza A/B, RSV and  SARS-CoV2 PCR (COVID-19) Nose     Status: Normal    Specimen: Nose; Swab   Result Value Ref Range    Influenza A PCR Negative Negative    Influenza B PCR Negative Negative    RSV PCR Negative Negative    SARS CoV2 PCR Negative Negative    Narrative    Testing was performed using the Xpert Xpress CoV2/Flu/RSV Assay on the Cepheid GeneXpert Instrument. This test should be ordered for the detection of SARS-CoV2, influenza, and RSV viruses in individuals with signs and symptoms of respiratory tract infection. This test is for in vitro diagnostic use under the [place] FDA for laboratories certified under CLIA to perform high or moderate complexity testing. This test has been [place] FDA cleared. A negative result does not rule out the presence of PCR inhibitors in the specimen or target RNA in concentration below the limit of detection for the assay. If only one viral target is positive but coinfection with multiple targets is suspected, the sample should be re-tested with another FDA cleared, approved, or authorized test, if coninfection would change clinical management. This test was validated by the [facility] [employer]. These laboratories are certified under the Clinical Laboratory Improvement Amendments of 1988 (CLIA-88) as qualified to perfom high complexity laboratory testing.   CBC with platelets and differential     Status: Abnormal   Result Value Ref Range    WBC Count 5.4 4.0 - 11.0 10e3/uL    RBC Count 4.49 3.80 - 5.20 10e6/uL    Hemoglobin 11.7 11.7 - 15.7 g/dL    Hematocrit 35.5 35.0 - 47.0 %    MCV 79 78 - 100 fL    MCH 26.1 (L) 26.5 - 33.0 pg    MCHC 33.0 31.5 - 36.5 g/dL    RDW 16.2 (H) 10.0 - 15.0 %    Platelet Count 292 150 - 450 10e3/uL    % Neutrophils 57 %    % Lymphocytes 36 %    % Monocytes 4 %    % Eosinophils 3 %    % Basophils 0 %    % Immature Granulocytes 0 %    NRBCs per 100 WBC 0 <1 /100    Absolute Neutrophils 3.1 1.6 - 8.3 10e3/uL    Absolute Lymphocytes 2.0 0.8 - 5.3 10e3/uL    Absolute  Monocytes 0.2 0.0 - 1.3 10e3/uL    Absolute Eosinophils 0.2 0.0 - 0.7 10e3/uL    Absolute Basophils 0.0 0.0 - 0.2 10e3/uL    Absolute Immature Granulocytes 0.0 <=0.4 10e3/uL    Absolute NRBCs 0.0 10e3/uL   T4 free     Status: Normal   Result Value Ref Range    Free T4 1.09 0.90 - 1.70 ng/dL   CBC with platelets differential     Status: Abnormal    Narrative    The following orders were created for panel order CBC with platelets differential.  Procedure                               Abnormality         Status                     ---------                               -----------         ------                     CBC with platelets and d...[[id number]]  Abnormal            Final result                 Please view results for these tests on the individual orders.     Medications   sodium chloride 0.9% BOLUS 1,000 mL (0 mLs Intravenous Stopped 2/13/25 1126)   ondansetron (ZOFRAN ODT) ODT tab 4 mg (4 mg Oral $Given 2/13/25 3093)     Labs Ordered and Resulted from Time of ED Arrival to Time of ED Departure   COMPREHENSIVE METABOLIC PANEL - Abnormal       Result Value    Sodium 143      Potassium 3.6      Carbon Dioxide (CO2) 28      Anion Gap 9      Urea Nitrogen 9.4      Creatinine 0.55      GFR Estimate >90      Calcium 8.8      Chloride 106      Glucose 110 (*)     Alkaline Phosphatase 78      AST 14      ALT 11      Protein Total 7.1      Albumin 3.9      Bilirubin Total 0.3     LIPASE - Abnormal    Lipase 8 (*)    CBC WITH PLATELETS AND DIFFERENTIAL - Abnormal    WBC Count 5.1      RBC Count 4.60      Hemoglobin 12.2      Hematocrit 37.5      MCV 82      MCH 26.5      MCHC 32.5      RDW 16.5 (*)     Platelet Count 290      % Neutrophils 50      % Lymphocytes 42      % Monocytes 5      % Eosinophils 3      % Basophils 0      % Immature Granulocytes 0      NRBCs per 100 WBC 0      Absolute Neutrophils 2.5      Absolute Lymphocytes 2.1      Absolute Monocytes 0.2      Absolute Eosinophils 0.1      Absolute Basophils  "0.0      Absolute Immature Granulocytes 0.0      Absolute NRBCs 0.0     PHOSPHORUS - Abnormal    Phosphorus 4.7 (*)    ETHYL ALCOHOL LEVEL - Normal    Alcohol ethyl <0.01     MAGNESIUM - Normal    Magnesium 2.0     ROUTINE UA WITH MICROSCOPIC REFLEX TO CULTURE   HCG QUALITATIVE URINE   URINE CULTURE     Head CT w/o contrast    (Results Pending)          Critical care was not performed.     Medical Decision Making  The patient's presentation was of moderate complexity (an acute illness with systemic symptoms).    The patient's evaluation involved:  review of external note(s) from 3+ sources (see separate area of note for details)  review of 3+ test result(s) ordered prior to this encounter (see separate area of note for details)  ordering and/or review of 3+ test(s) in this encounter (see separate area of note for details)  discussion of management or test interpretation with another health professional (admitting physician)    The patient's management necessitated high risk (a decision regarding hospitalization).    Assessment & Plan    Patient in the emergency department presents with nausea vomiting holding an emesis bag.  She endorses that she feels dizzy it sounds more like lightheaded as it is positional and resolves with sitting down but patient is somewhat of a poor historian as she seems somewhat somnolent.    Per chart review she was just discharged several hours prior to arrival.  Will repeat laboratory studies.  Of note she is also endorsing numbness tingling of unknown clear chronicity but at least 2 days though also states that it \"comes and goes a lot\".  Will do basic labs and reassess once patient clears and/or proceed with imaging if patient fails to clear.  I do have suspicion she might have either intoxication or withdrawal process contributing to her altered mental status.    Labs reassuring normal electrolytes. White count normal normal hemoglobin alcohol level negative pregnancy test pending at " this time.    Patient was reassessed after normal labs.  She reported she was feeling better with IV fluids.  Of note she stated now both feet feel numb and tingly.  She states this this is a chronic problem, bilateral in nature.  I have low suspicion for acute intracranial pathology.  Will add on magnesium and phosphorus to look for electrolyte abnormality.    Patient was p.o. challenged.  She is still quite somnolent was unable to tolerate p.o. she threw it back up.  Will obtain head CT does not seem to clear significantly but does still arouse to voice, move all extremities and answer questions appropriately but falls back asleep.  Will admit to the medicine service for inability to tolerate p.o. and ongoing monitoring    Will sign out head CT to p.m. physician to follow-up, would contact the appropriate services if there is any significant findings.     I have reviewed the nursing notes. I have reviewed the findings, diagnosis, plan and need for follow up with the patient.    New Prescriptions    No medications on file       Final diagnoses:   Intractable nausea and vomiting   I, Becca Crane, am serving as a trained medical scribe to document services personally performed by Marcel Amin MD, based on the provider's statements to me.     I, Marcel Amin MD, was physically present and have reviewed and verified the accuracy of this note documented by Becca Crane.     Marcel Amni MD  [facility] EMERGENCY DEPARTMENT  2/13/2025     Marcel Amin MD  02/13/25 0978

## 2025-02-13 NOTE — ED NOTES
Pt very drowsy and c/o of nausea and is sweating. RN attempted to complete COWS but was unable to get score as pt was unable to answer some questions d/t drowsiness. MD aware, per MD re attempted discharge in half hour

## 2025-02-13 NOTE — ED TRIAGE NOTES
Pt states she's been having abdominal pain, feeling nauseous and dizzy. Had diarrhea last night.

## 2025-02-13 NOTE — DISCHARGE INSTRUCTIONS
Brixadi Discharge Instructions  Today, you received an injection of Brixadi (buprenorphine) to treat your opioid addiction. This medication has been injected under your skin and will be released into your body slowly over the next week to help reduce cravings and prevent withdrawal. It will be important for you to share this information with other healthcare providers, so they know how best to care for you while you are being treated with buprenorphine extended-release (BUP-XR).     After the medication is injected, you may feel a small bump under the skin for several days to weeks, it will get smaller over time. Do NOT rub or massage the area; keep it clean and dry. You will want to watch the injection site for redness, swelling, worsening pain, and/or drainage. To help soothe any discomfort you may experiencing you can place an ice pack at the site for up to 15 minutes at a time.      Buprenorphine is a safe medication for the treatment of opioid use disorder   Common Side Effects Seek Medical Attention Avoid   Constipation  Dizziness  Drowsiness   Headache  Injection site itching  Injection site pain   Nausea  Vomiting  Severe allergic reaction (rash, itching, swelling, severe dizziness, trouble breathing)   Severe drowsiness or difficulty waking up   Confusion or severe mood changes  Difficulty breathing or shortness of breath  Infection at the injection site (redness, swelling, worsening pain, pus) Alcohol, may increase serious side effects  Opioids, you will not feel the same effect as usual  Sedatives, or medications not prescribed to you as these can interact with BUP-XR     What other medications will I be discharged with?  You may be discharged with a prescription for additional prescriptions including   Buprenorphine - to help treat withdrawal and craving (if they return)  Ondansetron (Zofran) - to help treat nausea & vomiting   Acetaminophen (Tylenol) - may help muscle aches & pains   Ibuprofen (Motrin)  "- may help muscle aches & pains   Hydroxyzine (Atarax) - may help lessen anxiety   Clonidine - to help treat anxiety & restlessness, lower blood pressure & heart rate  What should I watch out for?  Once you leave the Emergency Department (ED), you will want to watch out for worsening withdrawal symptoms.   Anxiety and/or extreme restlessness  Muscle aches and body pains  Intense cravings for opioids  Rapid or racing heart rate  High blood pressure  Sweating   Shaking or tremors  Nausea, vomiting, diarrhea   What if the withdrawal comes back?  If any of the above withdrawal symptoms return, take buprenorphine   Dose one or two 8mg tablets or strips UNDER your tongue (total dose 8-16mg)  Wait one hour   If you feel the same or worse repeat dose (8mg - 16mg) - give yourself time and allow the medication to work  The next day, take half of the total dose in the morning and at night   If you took a total of 16mg, then take 8mg in the morning and 8mg at night  If you took a total of 32mg, then take 16mg in the morning and 16mg at night   Return to the Emergency Room if your symptoms do not improve     If you have a light habit: (For example, 5 \"Norco 10's a day)  Consider a low dose: start with 4mg and stop at 8mg total   WARNING: Withdrawal will continue if you don't take enough bup.     If you have a heavy habit: (For example, injecting 2g heroin a day or smoking 1g Fentanyl a day)  Consider a high dose: start with a first dose of 16mg  For most people, the effects of bup max out at around 24-32mg  WARNING: Too much bup can make you feel sick and sleepy    What if the withdrawal is getting worse, not better?   If you believe the withdrawal is getting worse, return to the Emergency Department        Follow - up Care  How do I continue the medication?   It is extremely important that you call [phone] to schedule a follow-up appointment with one of our addiction medicine providers to continue your treatment plan for " opioid addiction.   If you do not receive a call within two (2) business days, you will want to contact   [facility]   [facility]   [address] [place] [address] [address]  [place] [address] [facility]   [address]rd [place] [address]     What other clinics can I go to for Buprenorphine?   The following clinics offer Medication for Addiction Treatment (MAT) and provide walk-in hours  [facility]  [address] (Entrance next to Emergency Department)   Walk-in Hours: Monday to Friday 9am to 11:30 and 12:30 to 3pm   Phone: [phone]     [facility]  [address] [place] [address]  [phone] *Press 1  [phone] (On-Call Access After Hours)  Peer Recovery Support: [phone]  Hours: Monday - Friday 8:30am to 5pm (Tues open at 9:30am; Lobby is closed 12:45 to 1:45 daily)    [facility] ([facility]  [address] [place] [address]  [phone] - Main Desk  [phone] - MAT / GI   [phone] (Nurse Line- Night & Weekends)  Hours: Monday - Friday 7:15am to 4:45pm and 2nd Saturday of Month 8:15am to 12:30pm     [facility] ([facility])  [facility]   [address] [place] [address]   [phone]   Hours: Monday - Friday 8am to 5pm  [facility]    [address] [place] [address]   [phone]   Hours: Monday - Friday 8am to 5pm  [facility]   [address] [place] [address]  [phone]   Hours: Monday - Friday 8am to 5pm    [facility]   [address]th [place] [address]  [phone]  Walk-in Hours: Mon - Fri 10am to 4pm    In case of an emergency, call 911 or go to the nearest emergency room.  ** If you begin to experience worsening withdrawal symptoms return to the ED. **    Crisis Hotlines and Text Lines    Suicide and Crisis Lifeline 9-8-8   Crisis Text Line Text  [place]  to 381210   [facility] Crisis Line  Dial **082447 from a mobile device   [facility] Dial [phone]   [facility] (LGBTQ+) Dial [phone]   [facility] Dial [phone]   [facility] Dial [phone]   Never Use Alone - Overdose Prevention Call [phone]       Remember: Follow your treatment plan and attend all follow-up appointments. If you have any questions or concerns, do not hesitate to reach out to your healthcare provider. Your health and recovery are important.

## 2025-02-13 NOTE — ED NOTES
"First Step  Program - Initial SUN Consult Note:    Demographics:  Patient name Juana Madrigal   /Age 1990, 34 year old   Gender/Ethnicity female   The patient's reported race is:    Black or   Choose not to Answer   Chief Complaint Foot Pain     Language of Care English    No     Writer was unit's assigned Substance Use Navigator (SUN) for the shift and was notified by Primary RN at  06:18 AM that Juana Madrigal presented seeking Mx for addiction treatment in the context of chronic opioid use. [facility]. Pt endorses using Fentanyl. Most recent use identified as approximately  08:00 AM on 2025. Current withdrawal symptoms indicated to be noteworthy for the following, per Pt: Nausea/vomiting     ED provider and primary RN notified of SUN consult and made aware of Pt's current condition/symptoms.    Other information:  Comfort items/interventions provided to Pt by SUN following initial consult: Warm blanket, Food/snacks, and Bathroom    Pt contact for follow-up: [phone]     "

## 2025-02-13 NOTE — ED PROVIDER NOTES
The patient was accepted at shift change signout with a plan to allow her to rest in the ED until morning, then discharge.  Prescriptions were already written for Subutex, Keflex, Narcan.  I have also placed a primary care referral as the patient states she would like to work on getting back on her rheumatoid arthritis medications.  She is encouraged to follow-up with recovery clinic as well.  She may return with worsening or concerns.    Dictation Disclaimer: Some of this Note has been completed with voice-recognition dictation software. Although errors are generally corrected real-time, there is the potential for a rare error to be present in the completed chart.       Anna Patino MD  02/13/25 0549

## 2025-02-14 LAB
ALBUMIN SERPL BCG-MCNC: 3.3 G/DL (ref 3.5–5.2)
ALP SERPL-CCNC: 62 U/L (ref 40–150)
ALT SERPL W P-5'-P-CCNC: 9 U/L (ref 0–50)
ANION GAP SERPL CALCULATED.3IONS-SCNC: 8 MMOL/L (ref 7–15)
AST SERPL W P-5'-P-CCNC: 15 U/L (ref 0–45)
BACTERIA UR CULT: NORMAL
BILIRUB SERPL-MCNC: 0.4 MG/DL
BUN SERPL-MCNC: 4.8 MG/DL (ref 6–20)
CALCIUM SERPL-MCNC: 8.4 MG/DL (ref 8.8–10.4)
CHLORIDE SERPL-SCNC: 106 MMOL/L (ref 98–107)
CREAT SERPL-MCNC: 0.55 MG/DL (ref 0.51–0.95)
EGFRCR SERPLBLD CKD-EPI 2021: >90 ML/MIN/1.73M2
ERYTHROCYTE [DISTWIDTH] IN BLOOD BY AUTOMATED COUNT: 16.5 % (ref 10–15)
GLUCOSE SERPL-MCNC: 79 MG/DL (ref 70–99)
HCO3 SERPL-SCNC: 28 MMOL/L (ref 22–29)
HCT VFR BLD AUTO: 32.3 % (ref 35–47)
HGB BLD-MCNC: 10.2 G/DL (ref 11.7–15.7)
MCH RBC QN AUTO: 25.7 PG (ref 26.5–33)
MCHC RBC AUTO-ENTMCNC: 31.6 G/DL (ref 31.5–36.5)
MCV RBC AUTO: 81 FL (ref 78–100)
PLATELET # BLD AUTO: 266 10E3/UL (ref 150–450)
POTASSIUM SERPL-SCNC: 3.5 MMOL/L (ref 3.4–5.3)
PROT SERPL-MCNC: 5.9 G/DL (ref 6.4–8.3)
RBC # BLD AUTO: 3.97 10E6/UL (ref 3.8–5.2)
SODIUM SERPL-SCNC: 142 MMOL/L (ref 135–145)
WBC # BLD AUTO: 3.3 10E3/UL (ref 4–11)

## 2025-02-14 PROCEDURE — 82310 ASSAY OF CALCIUM: CPT

## 2025-02-14 PROCEDURE — 82040 ASSAY OF SERUM ALBUMIN: CPT

## 2025-02-14 PROCEDURE — 120N000002 HC R&B MED SURG/OB UMMC

## 2025-02-14 PROCEDURE — 99253 IP/OBS CNSLTJ NEW/EST LOW 45: CPT

## 2025-02-14 PROCEDURE — 250N000013 HC RX MED GY IP 250 OP 250 PS 637

## 2025-02-14 PROCEDURE — 250N000012 HC RX MED GY IP 250 OP 636 PS 637: Performed by: INTERNAL MEDICINE

## 2025-02-14 PROCEDURE — 99254 IP/OBS CNSLTJ NEW/EST MOD 60: CPT | Mod: GC | Performed by: INTERNAL MEDICINE

## 2025-02-14 PROCEDURE — 86803 HEPATITIS C AB TEST: CPT

## 2025-02-14 PROCEDURE — 85014 HEMATOCRIT: CPT

## 2025-02-14 PROCEDURE — 99233 SBSQ HOSP IP/OBS HIGH 50: CPT | Performed by: INTERNAL MEDICINE

## 2025-02-14 PROCEDURE — 84155 ASSAY OF PROTEIN SERUM: CPT

## 2025-02-14 PROCEDURE — 36415 COLL VENOUS BLD VENIPUNCTURE: CPT

## 2025-02-14 PROCEDURE — 82533 TOTAL CORTISOL: CPT

## 2025-02-14 RX ORDER — PREDNISONE 20 MG/1
20 TABLET ORAL DAILY
Status: DISCONTINUED | OUTPATIENT
Start: 2025-02-14 | End: 2025-02-17

## 2025-02-14 RX ADMIN — CEPHALEXIN 500 MG: 500 CAPSULE ORAL at 08:34

## 2025-02-14 RX ADMIN — PREDNISONE 20 MG: 20 TABLET ORAL at 16:53

## 2025-02-14 RX ADMIN — CEPHALEXIN 500 MG: 500 CAPSULE ORAL at 20:02

## 2025-02-14 ASSESSMENT — ACTIVITIES OF DAILY LIVING (ADL)
ADLS_ACUITY_SCORE: 53
ADLS_ACUITY_SCORE: 53
ADLS_ACUITY_SCORE: 43
ADLS_ACUITY_SCORE: 53
ADLS_ACUITY_SCORE: 43
ADLS_ACUITY_SCORE: 53
ADLS_ACUITY_SCORE: 43
ADLS_ACUITY_SCORE: 53
ADLS_ACUITY_SCORE: 53
ADLS_ACUITY_SCORE: 43
ADLS_ACUITY_SCORE: 53
ADLS_ACUITY_SCORE: 43
ADLS_ACUITY_SCORE: 53
ADLS_ACUITY_SCORE: 43
ADLS_ACUITY_SCORE: 53

## 2025-02-14 NOTE — CONSULTS
"[facility] Inpatient Rheumatology Consultation    Patient name: Juana Madrigal  YOB: 1990  MRN: [id number]    Reason for consult: \"Known RA with social determinants limiting access to medications, has not taken medications recently, recent flares\"    =============================================================    Assessment and Plan:     # Rheumatoid arthritis, seropositive (CCP, RF)  # Diffuse joint pain, pain in balls of feet  # Paronychia R 5th toe  # Opioid use disorder  # Housing insecurity  Previously had improved symptoms while on methotrexate and enbril. We can likely restart these in an outpatient setting, but would avoid while actively ill with GI symptoms. She does appear to have evidence of inflammatory arthritis based on elevated CRP and significant joint pain in her usual RA distribution.   - Prednisone 20mg daily x 7 days followed by 15mg daily x 7 days for pain/RA flare  - Added on hepatitis serologies and quantiferon pending possible resumption of enbrel  - Hold off on resuming methotrexate or enbrel  - She should reestablish care with rheumatology when discharged if able  - Rheumatology will continue to follow     Discussed and seen with Dr. Dewayne Arreguin MD  Internal Medicine PGY3    I saw this patient with the medical resident. I agree with the stated findings and recommendations which reflect our joint impressions and plan. Plan to followup with Dr. Buchanan in Rheumatology clinic in 3 weeks.    Devendra Buchanan M.D.  [facility]  Pager [phone]    On the day of the encounter, a total of 61 minutes was spent in chart review, and in counseling and coordination of care, regarding the patient's complex medical problem of rheumatoid arthritis, opiate use disorder, high risk medication.    ==============================================================    HPI:    Juana Madrigal is a 34 year old female with a past medical " history of seropositive rheumatoid arthritis, asthma, GERD, allergic rhinitis, depression, alcohol use disorder, opioid use disorder who presented 2/13 with nausea and encephalopathy.    Timeline: Diagnosed 2016, started on methotrexate. Per 2020 note at that time was on 10mg weekly in addition to Humira injections every other week. 2022 She had self-dc'd Humira due to concerns for VODI which resulted in relapse of symtpoms. She was later transitioned to etanercept with good disease control however this was held due to infections. June 2022 there was swelling and tenderness noted in the left knee, various MTPs, and wrists. She was given a prednisone burst at that time and the plan was to resume entanercept monotherapy.    2023 she was again back on methotrexate monotherapy. 07/2023 had an RA flare and was treated with prednisone and ibuprofen. Established PCP 20203 with [place]. Given prednisone taper and toradol for ongoing joint pain issues. Saw Dr. Christina with [facility] 02/2024. At that time was taking 10mg methotrexate weekly in addition to a temporary course of prednisone 20mg which she had been prescribed in the ED. She endorsed ongoing joint pain and so plan at that time had been to continue methotrexate, re-initiate weekly enbrel, and start prednsione taper to bridge.     Also endorsing pins and needles sensation in hands and feet going on for over a month.     She has not used any RA medications in several months. Endorsing pain in her elbows, wrists, hands, knees, and the bottom of her feet. She also has paronychia in her R 5th toe which is being treated. She wants to resume her RA medicines but not now while she is feeling nauseous.     Objective:  /66   Pulse 93   Temp 98.9  F (37.2  C)   Resp 18   LMP 01/24/2025   SpO2 100%   GEN: sitting up unassisted, NAD, fatigued  HEENT: No facial rash, sclera clear, no oral or nasal ulcers  Pulm: No increased work of breathing on room air  Extremities:  small effusions and warmth on bilateral knees. No swelling of ankles, wrists, MCPs, DIPs, PIPs, elbows. Slightly limited flexion of fingers. Slight swan neck deformities on bilateral hands. R 5th toe with minor serous discharge  Skin: No acute cutaneous changes     Data   Past Medical History:  Past Medical History:   Diagnosis Date    Arthritis     GERD (gastroesophageal reflux disease)     Rheumatoid arthritis (H)     Uncomplicated asthma        Past Surgical History:  No past surgical history on file.    Medications:  Current Facility-Administered Medications   Medication Dose Route Frequency Provider Last Rate Last Admin    acetaminophen (TYLENOL) tablet 650 mg  650 mg Oral Q4H PRN Will Zapata PA-C        Or    acetaminophen (TYLENOL) Suppository 650 mg  650 mg Rectal Q4H PRN Will Zapata PA-C        albuterol (PROVENTIL HFA/VENTOLIN HFA) inhaler  2 puff Inhalation Q4H PRN Will Zapata PA-C        buprenorphine HCl-naloxone HCl (SUBOXONE) 2-0.5 MG per film 1 Film  1 Film Sublingual Q2H PRN Will Zapata PA-C        Followed by    buprenorphine HCl-naloxone HCl (SUBOXONE) 8-2 MG per film 1 Film  1 Film Sublingual Q24H Will Zapata PA-C        cephALEXin (KEFLEX) capsule 500 mg  500 mg Oral BID Will Zapata PA-C   500 mg at 02/14/25 0834    cloNIDine (CATAPRES) tablet 0.1 mg  0.1 mg Oral Q6H PRN Will Zapata PA-C        dicyclomine (BENTYL) capsule 20 mg  20 mg Oral TID PRN Will Zapata PA-C        loperamide (IMODIUM) capsule 2 mg  2 mg Oral 4x Daily PRN Will Zapata PA-C        polyethylene glycol (MIRALAX) Packet 17 g  17 g Oral BID PRN Will Zapata PA-C        prochlorperazine (COMPAZINE) injection 10 mg  10 mg Intravenous Q6H PRN Will Zapata PA-C        Or    prochlorperazine (COMPAZINE) tablet 10 mg  10 mg Oral Q6H PRN Will Zapata PA-C        senna-docusate (SENOKOT-S/PERICOLACE) 8.6-50 MG per tablet 1 tablet  1 tablet Oral BID PRN Will Zapata PA-C        Or    senna-docusate (SENOKOT-S/PERICOLACE) 8.6-50  MG per tablet 2 tablet  2 tablet Oral BID EDUARDON Will Zapata PA-C           Allergies:  No Known Allergies    Immunizations:  Immunization History   Administered Date(s) Administered    COVID-19 MONOVALENT 12+ (Pfizer) 03/25/2021, 04/15/2021       Social History:   reports that she has been smoking. She has never used smokeless tobacco. She reports that she does not currently use alcohol. She reports current drug use. Drug: Marijuana.    Family history:  No family history of autoimmune disease    Labs and Imaging: Reviewed as in synopsis above

## 2025-02-14 NOTE — CONSULTS
2/14/2025  CLINT Consult acknowledged.  I was informed that pt was tired today and not appropriate to be seen today for CLINT services. Staff will attempt to see pt for CLINT Consult Monday 2/17/25.  If pt discharges prior to consult, they can call [facility] at [phone] to schedule an OP CLINT Assessment. I placed the below information in her AVS today in case she discharges over the weekend.    IOP CLINT treatment options:  [facility] IOP:  Phone: [phone]  [email or web address]   [place] Co-occurring IOP: M,W, TH 9am-12pm (Accepts Medicare)   [facility] Co-occurring IOP Morning:  M,T,W 9am-12:00pm   [facility] Co-occurring IOP Evening: M,T,W 5:00pm-8:00pm (accepts Medicare)   [facility] Evening IOP: M,T, Th 5:30-7:30 W 5:30-8:30   [place] IOP: M,T,W 3:30pm-5:30pm and Th 3:30pm-6:30pm     [place] IOP: M,W, TH 9am-12pm (ages: 18-26)    [facility]: (in-person & virtual)  [facility],   [place] [address].  Phone: [phone]  Fax: [phone]  email:  [email or web address]  [email or web address]  *Virtual Day Group: (16-18 weeks long & co-occurring)  Mondays, Wednesdays, Thursdays 11:00am - 1:00pm and Tuesdays 10:00am - 12:00pm.      [facility] [place] (in-person & virtual)  [address] [place] [address]  Phone: [phone]  Fax: [phone]  Email: [email or web address]  [email or web address]  Day program meets Monday-Thursday from 8:30am-12:30pm  Evening program meets Monday-Thursday from 6pm-9pm  Virtual Group: Monday-Friday 6pm-9pm    [facility]: (in-person & virtual)  Locations: [facility], [place], [facility], and [facility].  Phone: [phone]  Fax:  [phone]  Email: [email or web address]  email: [email or web address]  [email or web address]  *Virtual IOP:  Day program: Monday through Thursday from 9 a - noon.    Evening program: Monday through Thursday from 5 p - 8 p.    [facility]: (in-person & virtual)  Main phone: [phone]  Direct Dial: [phone]   Admissions email: [email or web address]   CLINT fax: [phone]  [email or web address]    [facility] IOP: (virtual & in-person)  Phone: [phone]  Fax: [phone]  Admission Specialist: Emmanuelle Galo  Phone: [phone]  email: [email or web address]  [email or web address]  Groups:  1) Morning IOP + Mother's Group: Mon-Thurs 9am-noon  2) Morning In Person Group: Mon-Thurs 9am-noon  3) Morning Telehealth Group: Mon-Thurs 9am-noon  4) Afternoon Telehealth Group: Mon-Thurs 1-3pm  5) Evening Group: Mon-Thurs 5-8pm    Reflections: men & women IOP  [facility]: [address] [place] [address]  [facility]: [address] [place] [address]  [facility]: [address] [place] [address]  Intake & Assessment Appointments: [phone]  Referral Fax # [phone]  Referral Email: [email or web address]  [facility] Cell: [phone]  IOP Group: Monday -Thursday 8:00am - 12:40pm  IOP Virtual Group: Monday-Thursday 8am-12:40pm  [email or web address]    [facility]: IOP- virtual  [address]  [place] [address]  Phone: [phone]  Fax: [phone]  email: [email or web address]  [email or web address]  Monday - Thursday 5:30 pm - 8:30 pm, Friday 11:00 am - 1:00 pm    Rhea Gregory MA [facility]  CLINT Evaluation Counselor  [phone]  [email or web address]

## 2025-02-14 NOTE — CONSULTS
Initial Psychiatric Consult   Consult date: February 14, 2025         Reason for Consult, requesting source:    Opioid use disorder   Requesting source: Aishwarya Montero    Labs and imaging reviewed. Patient seen and evaluated by GABE Hernandez CNP          HPI:   Juana Madrigal is a 34 year old female admitted on 2/13/2025. She has a past medical history of asthma, rheumatoid arthritis, GERD, allergic rhinitis, depression. ADHD, alcohol use disorder, and opioid use disorder. She presented to the ED with nausea and altered mental status; overall picture concerning for drug induced encephalopathy with likely opioid withdrawal. Was admitted to internal medicine for further assessment and monitoring.      Per chart review, patient was seen 2/12/25 at [facility] ED for multiple physical complaints including nausea, chronic pain, and bilateral foot pain.  She reported 4 days since last fentanyl use and was treated with Subutex, Brixadi, and Keflex for early soft tissue infection of toe.    Received Brixadi 24mg 2/13 on 0041. Subutex 4mg 2/13 at 0000. Feeling better today, still feels tired. No opioid cravings.         Past Psychiatric History:   Historically [facility] - given risperidone for delusional disorder and Wellbutrin for depression.     Psychotropic trials: Risperidone, Wellbutrin, Sertraline, Suboxone and Fluoxitine     No history of psychiatric hospitalizations or suicide attempts         Substance Use and History:   Opioid use disorder - fentanyl 4 days prior to admission         Past Medical History:   PAST MEDICAL HISTORY:   Past Medical History:   Diagnosis Date    Arthritis     GERD (gastroesophageal reflux disease)     Rheumatoid arthritis (H)     Uncomplicated asthma        PAST SURGICAL HISTORY: No past surgical history on file.          Family History:   FAMILY HISTORY: No family history on file.    Family Psychiatric  History: unknown         Social History:   SOCIAL HISTORY:   Social History     Tobacco Use    Smoking status: Some Days    Smokeless tobacco: Never    Tobacco comments:     Occasional Hookah use (tobacco)    Substance Use Topics    Alcohol use: Not Currently       Unhoused, has 2 children previously in the care of her mother          Physical ROS:   The 10 point Review of Systems is negative other than noted in the HPI or here.           Medications:     Current Facility-Administered Medications   Medication Dose Route Frequency Provider Last Rate Last Admin    buprenorphine HCl-naloxone HCl (SUBOXONE) 8-2 MG per film 1 Film  1 Film Sublingual Q24H Will Zapata PA-C        cephALEXin (KEFLEX) capsule 500 mg  500 mg Oral BID Will Zapata PA-C   500 mg at 02/14/25 0834              Allergies:   No Known Allergies       Labs:     Recent Results (from the past 48 hours)   Comprehensive metabolic panel    Collection Time: 02/12/25 11:38 PM   Result Value Ref Range    Sodium 138 135 - 145 mmol/L    Potassium 3.6 3.4 - 5.3 mmol/L    Carbon Dioxide (CO2) 27 22 - 29 mmol/L    Anion Gap 12 7 - 15 mmol/L    Urea Nitrogen 12.1 6.0 - 20.0 mg/dL    Creatinine 0.54 0.51 - 0.95 mg/dL    GFR Estimate >90 >60 mL/min/1.73m2    Calcium 9.1 8.8 - 10.4 mg/dL    Chloride 99 98 - 107 mmol/L    Glucose 109 (H) 70 - 99 mg/dL    Alkaline Phosphatase 96 40 - 150 U/L    AST 18 0 - 45 U/L    ALT 11 0 - 50 U/L    Protein Total 7.2 6.4 - 8.3 g/dL    Albumin 3.9 3.5 - 5.2 g/dL    Bilirubin Total 0.2 <=1.2 mg/dL   Lipase    Collection Time: 02/12/25 11:38 PM   Result Value Ref Range    Lipase 18 13 - 60 U/L   Procalcitonin    Collection Time: 02/12/25 11:38 PM   Result Value Ref Range    Procalcitonin 0.04 <0.50 ng/mL   Troponin T, High Sensitivity    Collection Time: 02/12/25 11:38 PM   Result Value Ref Range    Troponin T, High Sensitivity 6 <=14 ng/L   Magnesium    Collection Time: 02/12/25 11:38 PM   Result Value Ref Range    Magnesium 1.9 1.7  - 2.3 mg/dL   TSH with free T4 reflex    Collection Time: 02/12/25 11:38 PM   Result Value Ref Range    TSH 6.36 (H) 0.30 - 4.20 uIU/mL   CRP inflammation    Collection Time: 02/12/25 11:38 PM   Result Value Ref Range    CRP Inflammation 17.19 (H) <5.00 mg/L   Erythrocyte sedimentation rate auto    Collection Time: 02/12/25 11:38 PM   Result Value Ref Range    Erythrocyte Sedimentation Rate 26 (H) 0 - 20 mm/hr   HCG qualitative Blood    Collection Time: 02/12/25 11:38 PM   Result Value Ref Range    hCG Serum Qualitative Negative Negative   CBC with platelets and differential    Collection Time: 02/12/25 11:38 PM   Result Value Ref Range    WBC Count 5.4 4.0 - 11.0 10e3/uL    RBC Count 4.49 3.80 - 5.20 10e6/uL    Hemoglobin 11.7 11.7 - 15.7 g/dL    Hematocrit 35.5 35.0 - 47.0 %    MCV 79 78 - 100 fL    MCH 26.1 (L) 26.5 - 33.0 pg    MCHC 33.0 31.5 - 36.5 g/dL    RDW 16.2 (H) 10.0 - 15.0 %    Platelet Count 292 150 - 450 10e3/uL    % Neutrophils 57 %    % Lymphocytes 36 %    % Monocytes 4 %    % Eosinophils 3 %    % Basophils 0 %    % Immature Granulocytes 0 %    NRBCs per 100 WBC 0 <1 /100    Absolute Neutrophils 3.1 1.6 - 8.3 10e3/uL    Absolute Lymphocytes 2.0 0.8 - 5.3 10e3/uL    Absolute Monocytes 0.2 0.0 - 1.3 10e3/uL    Absolute Eosinophils 0.2 0.0 - 0.7 10e3/uL    Absolute Basophils 0.0 0.0 - 0.2 10e3/uL    Absolute Immature Granulocytes 0.0 <=0.4 10e3/uL    Absolute NRBCs 0.0 10e3/uL   T4 free    Collection Time: 02/12/25 11:38 PM   Result Value Ref Range    Free T4 1.09 0.90 - 1.70 ng/dL   Influenza A/B, RSV and SARS-CoV2 PCR (COVID-19) Nose    Collection Time: 02/12/25 11:41 PM    Specimen: Nose; Swab   Result Value Ref Range    Influenza A PCR Negative Negative    Influenza B PCR Negative Negative    RSV PCR Negative Negative    SARS CoV2 PCR Negative Negative   Comprehensive metabolic panel    Collection Time: 02/13/25  9:48 AM   Result Value Ref Range    Sodium 143 135 - 145 mmol/L    Potassium 3.6 3.4 -  5.3 mmol/L    Carbon Dioxide (CO2) 28 22 - 29 mmol/L    Anion Gap 9 7 - 15 mmol/L    Urea Nitrogen 9.4 6.0 - 20.0 mg/dL    Creatinine 0.55 0.51 - 0.95 mg/dL    GFR Estimate >90 >60 mL/min/1.73m2    Calcium 8.8 8.8 - 10.4 mg/dL    Chloride 106 98 - 107 mmol/L    Glucose 110 (H) 70 - 99 mg/dL    Alkaline Phosphatase 78 40 - 150 U/L    AST 14 0 - 45 U/L    ALT 11 0 - 50 U/L    Protein Total 7.1 6.4 - 8.3 g/dL    Albumin 3.9 3.5 - 5.2 g/dL    Bilirubin Total 0.3 <=1.2 mg/dL   Lipase    Collection Time: 02/13/25  9:48 AM   Result Value Ref Range    Lipase 8 (L) 13 - 60 U/L   CBC with platelets and differential    Collection Time: 02/13/25  9:48 AM   Result Value Ref Range    WBC Count 5.1 4.0 - 11.0 10e3/uL    RBC Count 4.60 3.80 - 5.20 10e6/uL    Hemoglobin 12.2 11.7 - 15.7 g/dL    Hematocrit 37.5 35.0 - 47.0 %    MCV 82 78 - 100 fL    MCH 26.5 26.5 - 33.0 pg    MCHC 32.5 31.5 - 36.5 g/dL    RDW 16.5 (H) 10.0 - 15.0 %    Platelet Count 290 150 - 450 10e3/uL    % Neutrophils 50 %    % Lymphocytes 42 %    % Monocytes 5 %    % Eosinophils 3 %    % Basophils 0 %    % Immature Granulocytes 0 %    NRBCs per 100 WBC 0 <1 /100    Absolute Neutrophils 2.5 1.6 - 8.3 10e3/uL    Absolute Lymphocytes 2.1 0.8 - 5.3 10e3/uL    Absolute Monocytes 0.2 0.0 - 1.3 10e3/uL    Absolute Eosinophils 0.1 0.0 - 0.7 10e3/uL    Absolute Basophils 0.0 0.0 - 0.2 10e3/uL    Absolute Immature Granulocytes 0.0 <=0.4 10e3/uL    Absolute NRBCs 0.0 10e3/uL   Alcohol level blood    Collection Time: 02/13/25  9:48 AM   Result Value Ref Range    Alcohol ethyl <0.01 <=0.01 g/dL   Magnesium    Collection Time: 02/13/25  9:48 AM   Result Value Ref Range    Magnesium 2.0 1.7 - 2.3 mg/dL   Phosphorus    Collection Time: 02/13/25  9:48 AM   Result Value Ref Range    Phosphorus 4.7 (H) 2.5 - 4.5 mg/dL   Vitamin B12    Collection Time: 02/13/25  9:48 AM   Result Value Ref Range    Vitamin B12 320 232 - 1,245 pg/mL   Hemoglobin A1c    Collection Time: 02/13/25  9:48  AM   Result Value Ref Range    Estimated Average Glucose 114 <117 mg/dL    Hemoglobin A1C 5.6 <5.7 %   EKG 12-lead, complete    Collection Time: 02/13/25  5:12 PM   Result Value Ref Range    Systolic Blood Pressure  mmHg    Diastolic Blood Pressure  mmHg    Ventricular Rate 88 BPM    Atrial Rate 88 BPM    FL Interval 144 ms    QRS Duration 88 ms     ms    QTc 486 ms    P Axis 37 degrees    R AXIS -2 degrees    T Axis 36 degrees    Interpretation ECG       Sinus rhythm  Prolonged QT  Abnormal ECG  Unconfirmed report - interpretation of this ECG is computer generated - see medical record for final interpretation  Confirmed by - EMERGENCY ROOM, PHYSICIAN ([id number]),  PREETI TEJADA ([id number]) on 2/13/2025 9:29:03 PM     UA with Microscopic reflex to Culture    Collection Time: 02/13/25  5:36 PM    Specimen: Urethra; Urine   Result Value Ref Range    Color Urine Yellow Colorless, Straw, Light Yellow, Yellow    Appearance Urine Clear Clear    Glucose Urine Negative Negative mg/dL    Bilirubin Urine Negative Negative    Ketones Urine Negative Negative mg/dL    Specific Gravity Urine 1.024 1.003 - 1.035    Blood Urine Negative Negative    pH Urine 5.5 5.0 - 7.0    Protein Albumin Urine Negative Negative mg/dL    Urobilinogen Urine Normal Normal, 2.0 mg/dL    Nitrite Urine Negative Negative    Leukocyte Esterase Urine Trace (A) Negative    Mucus Urine Present (A) None Seen /LPF    RBC Urine 1 <=2 /HPF    WBC Urine 4 <=5 /HPF    Squamous Epithelials Urine 7 (H) <=1 /HPF   HCG qualitative urine    Collection Time: 02/13/25  5:36 PM   Result Value Ref Range    hCG Urine Qualitative Negative Negative   Urine Drug Screen Panel    Collection Time: 02/13/25  5:36 PM   Result Value Ref Range    Amphetamines Urine Screen Positive (A) Screen Negative    Barbituates Urine Screen Negative Screen Negative    Benzodiazepine Urine Screen Negative Screen Negative    Cannabinoids Urine Screen Negative Screen Negative    Cocaine Urine  Screen Negative Screen Negative    Fentanyl Qual Urine Screen Positive (A) Screen Negative    Opiates Urine Screen Negative Screen Negative    PCP Urine Screen Negative Screen Negative   HIV Antigen Antibody Combo Cascade    Collection Time: 02/13/25  6:21 PM   Result Value Ref Range    HIV Antigen Antibody Combo Nonreactive Nonreactive   Hepatitis B surface antibody    Collection Time: 02/13/25  7:00 PM   Result Value Ref Range    Hepatitis B Surface Antibody Nonreactive     Hepatitis B Surface Antibody Instrument Value <3.50 <8.5 m[IU]/mL   Comprehensive metabolic panel    Collection Time: 02/14/25  6:35 AM   Result Value Ref Range    Sodium 142 135 - 145 mmol/L    Potassium 3.5 3.4 - 5.3 mmol/L    Carbon Dioxide (CO2) 28 22 - 29 mmol/L    Anion Gap 8 7 - 15 mmol/L    Urea Nitrogen 4.8 (L) 6.0 - 20.0 mg/dL    Creatinine 0.55 0.51 - 0.95 mg/dL    GFR Estimate >90 >60 mL/min/1.73m2    Calcium 8.4 (L) 8.8 - 10.4 mg/dL    Chloride 106 98 - 107 mmol/L    Glucose 79 70 - 99 mg/dL    Alkaline Phosphatase 62 40 - 150 U/L    AST 15 0 - 45 U/L    ALT 9 0 - 50 U/L    Protein Total 5.9 (L) 6.4 - 8.3 g/dL    Albumin 3.3 (L) 3.5 - 5.2 g/dL    Bilirubin Total 0.4 <=1.2 mg/dL   CBC with platelets    Collection Time: 02/14/25  6:35 AM   Result Value Ref Range    WBC Count 3.3 (L) 4.0 - 11.0 10e3/uL    RBC Count 3.97 3.80 - 5.20 10e6/uL    Hemoglobin 10.2 (L) 11.7 - 15.7 g/dL    Hematocrit 32.3 (L) 35.0 - 47.0 %    MCV 81 78 - 100 fL    MCH 25.7 (L) 26.5 - 33.0 pg    MCHC 31.6 31.5 - 36.5 g/dL    RDW 16.5 (H) 10.0 - 15.0 %    Platelet Count 266 150 - 450 10e3/uL          Physical and Psychiatric Examination:     /66   Pulse 93   Temp 98.9  F (37.2  C)   Resp 18   LMP 01/24/2025   SpO2 100%   Weight is 0 lbs 0 oz  There is no height or weight on file to calculate BMI.    Physical Exam:  I have reviewed the physical exam as documented by by the medical team and agree with findings and assessment and have no additional  findings to add at this time.    Mental Status Exam:    Appearance: awake, alert and adequately groomed  Attitude:  cooperative  Eye Contact:  good  Mood:  fine   Affect:  appropriate and in normal range and mood congruent  Speech:  clear, coherent  Language: Fluent in english   Psychomotor Behavior:  no evidence of tardive dyskinesia, dystonia, or tics  Thought Process:  logical, linear, and goal oriented  Associations:  no loose associations  Thought Content:  no evidence of suicidal ideation or homicidal ideation and no evidence of psychotic thought  Insight:  good  Judgement:  intact  Oriented to:  time, person, and place  Attention Span and Concentration:  intact  Recent and Remote Memory:  intact  Fund of Knowledge: Appropriate   Gait and Station: baseline                DSM-5 Diagnosis:   Opioid use disorder, severe on maintenance           Assessment:   Patient got Brixadi 2/13 0001, takes about 24 hours to reach steady state. Is feeling better today. Her brixadi injection of 24mg is equivalent to total daily dose of 12-16mg of buprenorphine. She says she is pretty groggy, doesn't have any cravings or withdrawal symptoms today.           Summary of Recommendations:     -Provided Recovery clinic information and recommended she go there end of next week when Brixadi injection wears off    -If she were to develop cravings, can add additional 4-8mg buprenorphine sublingually daily      DAHLIA Pittman-BC  Consult/Liaison Psychiatry   [facility]

## 2025-02-14 NOTE — PROGRESS NOTES
[facility]    Medicine Progress Note - Hospitalist Service, GOLD TEAM 16    Date of Admission:  2/13/2025    Assessment & Plan   Juana Madrigal is a 34 year old female admitted on 2/13/2025. She has a past medical history of asthma, rheumatoid arthritis, GERD, allergic rhinitis, depression. ADHD, alcohol use disorder, and opioid use disorder. She presented to the ED with nausea and altered mental status; overall picture concerning for drug induced encephalopathy with likely opioid withdrawal. Was admitted to internal medicine for further assessment and monitoring.      Per chart review, patient was seen 2/12/25 at [facility] ED for multiple physical complaints including nausea, chronic pain, and bilateral foot pain.  She reported 4 days since last fentanyl use and was treated with Subutex, Brixadi, and Keflex for early soft tissue infection of toe.         # Altered mental status, Concern for drug induced encephalopathy   # Concern for opioid withdrawal   # Polysubstance use disorder (meth, opioid, hx alcohol use)  # Nausea, vomiting   Presented to the ED complaining of abdominal pain and nausea. On exam for ED provider, was found to be slow to answer and drowsy. Abdomen nontender and soft. With history of meth and opioid use, last use of fentanyl 2/08/25 and denies use since- with hx of recent opioid overdoses 1/09/25, 2/05/25 with subsequent ED visits.  Patient stated she was given beans and rice by a man and that she feels it could have been laced with meth but denies ingestion of meth on purpose. UDS with fentanyl and amphetamines. Also presented to ED 2/12/25 with nausea and vomiting; was given brixadi (weekly) and sublingual buprenorphine.  Head CT negative for acute findings, once again shows nonspecific empty sella.  UA bland, pregnancy test negative.  CBC, LFTs, BMP, procalcitonin, lipase normal on admission.  Influenza A/B, RSV, COVID-19  PCR testing negative.  HIV, hepatitis B antibodies negative.  Afebrile, hemodynamically stable.  Nausea and vomiting have subsequently largely resolved and tolerating oral intake well since 2/14.  Hypersomnolence largely resolved now, fully oriented and appropriate and ambulating.  Neurologically intact. COWS scores 5-3.  Seen by psychiatry.  Georgette Josie evaluation deferred due to hypersomnolence, planning to see 2/17.  -Discussed with patient 2/15.  She resides in a shelter and is in the process of getting housing.  She is very much interested in residential CD treatment so long as it does not affect her housing placement.  Will plan to discuss options with MSW, chemical dependency.   -Psychiatry provided recovery clinic information and recommended that patient go there at the end of next week when her Brixadi injection wears off  -Per psychiatry, if patient develops cravings could consider adding additional 4-8 mg buprenorphine sublingually daily      #Nonspecific empty sella:   Noted on CT head, previous CT head at [facility] 10/20/24 also noted partially empty chan with prominence of optic nerve that may suggest idiopathic intracranial hypertension.  Patient denies any issues with headaches or altered vision.  Neurologically intact on exam aside from being somewhat hypersomnolent.  Seen by ophthalmology 2/14/2025, normal eye exam, no optic nerve head edema, no evidence of increased intracranial pressure on ophthalmic exam.  Morning cortisol normal.  Has minimally elevated TSH with normal free T4.      # Infection of right 5th toe (paronychia)  Presented to ED 2/12/25 and noted painful right fifth toe, with 1 cm ulceration over the dorsum of the toe with scant amount of purulent drainage and some swelling.  Started 500 mg BID for 7 days on ED visit 2/12 but she did not continue taking. WBC, procalcitonin normal.  CRP elevated at 17.19 at this admission.  Afebrile.  X-ray 2/13 showed no acute displaced fracture or  "subluxation, no discrete erosion.  On current exam, small ulcerated area on dorsum of right fifth toe, no pain with range of motion of the proximal toe or palpation of the foot.  No induration or crepitus.  No active drainage.  - Continue keflex x 7 days   - Wound cares    # Rheumatoid arthritis   # Bilateral foot pain   # Peripheral neuropathy   On arrival, endorsed \"pins-and-needles\" sensation in hands and feet which has been going on for over a month. Known hx of RA and has not been taking her home medications (methotrexate and prednisone). 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 Previously on humira but this was discontinued given COVID concerns in 2022. Last plan was to continue methotrexate 10 mg, enbrel injection weekly, and prednisone.  Patient was last seen 2/26/24 by Dr. Christina.  B12, A1c normal.  Inpatient rheumatology consult obtained and greatly appreciated.  Pretreatment ID screening initiated, HIV, hepatitis B serologies, hepatitis C antibody negative.  Started prednisone 20 mg daily for 7 days per rheumatology recommendations on 2/14.  -Continue prednisone 20 mg daily for 7 days followed by 15 mg daily for 7 days, started 2/14  -Await Quant TB Gold results  -Per rheumatology recommendations, delaying restart of previously successful combination therapy with methotrexate 8 tablets (20 mg) once weekly, daily folic acid, and etanercept 50 mg subcutaneous once weekly for now with recommendations to initiate these at hospital discharge.  -Will need to limit alcohol intake to 2 drinks weekly, which further drives the importance of CD treatment  -AST/ALT, albumin, CBC with platelets every 3 months  -Follow-up in rheumatology clinic with Dr. Buchanan in 3-6 weeks.  -Place [facility] rheumatology referral at discharge and rheumatology RN care coordinators will contact patient to arrange follow-up visit to an " outpatient clinic at [facility] or other [place] rheumatology clinic site.      #Leukopenia  #Anemia, borderline microcytic:  WBC has historically been consistently normal, including at the time of admission 2025.  Mild intermittent anemia with borderline microcytosis.  Mild leukopenia, anemia , likely dilutional component following IV fluids.  No bleeding clinically.  Afebrile.  Hemoglobin improved 11.2, WBC 4.5 on 2/15.  Ferritin 39, decreased iron stores but decreased TIBC c/w ACD +/- ISAIAH.   -Start iron gluconate daily     # Asthma   Denies cough or dyspnea.  Afebrile, oxygenating well on room air.  Lungs CTA.  - PRN albuterol ordered      # Allergic rhinitis   - No longer taking flonase      # Depression  # ADHD   - No longer taking wellbutrin or sertraline      # GERD   - not on pta medication management      # Minimally elevated TSH  Minimally elevated TSH, normal free T4 on 25.     # Experiencing housing insecurity  - SW consulted         Diet: Advance Diet as Tolerated: Clear Liquid Diet; Regular Diet Adult    DVT Prophylaxis: Pneumatic Compression Devices  Ruvalcaba Catheter: Not present  Lines: None     Cardiac Monitoring: None  Code Status: Full Code          Social Drivers of Health    Food Insecurity: At Risk (2025)    Received from [employer]    Food [employer]     Food: 2   Depression: At risk (2024)    PHQ-2     PHQ-2 Score: 6   Housing Stability: At Risk (2024)    Received from [employer]    Housing Stability     Housin   Tobacco Use: High Risk (2025)    Patient History     Smoking Tobacco Use: Some Days     Smokeless Tobacco Use: Never          Disposition Plan     Medically Ready for Discharge: Anticipated in 2-4 Days             David Pruett MD  Hospitalist Service, GOLD TEAM [address] [place] [address]  Securely message with [employer] (more info)  Text page via [employer] Paging/Directory   See signed in provider for up to date coverage  information  ______________________________________________________________________    Interval History   Feeling better.  Some intermittent nausea at times with small emesis this morning, but otherwise eating some and drinking well.  Had a loose stool this morning, no abdominal pain.  No fevers or chills.  Only current pain complaint is right fifth toe pain.  Discussed chemical dependency treatment with patient.  She has been living in a shelter and is in the process of obtaining housing.  She is very much interested in residential CD treatment so long as it does not interfere with her obtaining housing.    Physical Exam   Vital Signs: Temp: 98.9  F (37.2  C) Temp src: Oral BP: 101/66 Pulse: 93   Resp: 18 SpO2: 100 % O2 Device: None (Room air)    Weight: 0 lbs 0 oz  General: Hypersomnolent but easily arousable and conversant.  Once awake follows commands appropriately.  HEENT: Atraumatic.  PERRL.  EOM intact.  OP moist and clear.  Neck supple, nontender.  Chest: CTA bilaterally  CV: RRR.  No murmurs.  Abdomen: Normal active bowel sounds.  Soft, nontender nondistended.  Extremities, MS: No edema.  Small right ulcer on the dorsum of the right fifth toe adjacent to the nailbed, surrounding skin clean.  No proximal tenderness, no pain with range of motion of the MTP.  No other active synovitis appreciable on exam.  Neuro: CN II through XII grossly intact.  Strength 5/5 symmetrically.  Normal finger-nose bilaterally.  No tremors.            55 MINUTES SPENT BY ME on the date of service doing chart review, history, exam, documentation & further activities per the note.      Data   Imaging results reviewed over the past 24 hrs:   Recent Results (from the past 24 hours)   Head CT w/o contrast    Narrative    EXAM: CT HEAD W/O CONTRAST  LOCATION: [facility]  DATE: 2/13/2025    INDICATION: somluent, vomiting  COMPARISON: None.  TECHNIQUE: Routine CT Head without IV contrast.  Multiplanar reformats. Dose reduction techniques were used.    FINDINGS:  INTRACRANIAL CONTENTS: No intracranial hemorrhage or mass effect. No CT evidence of acute infarct. Preserved parenchymal attenuation for patient age. Preserved ventricles and sulci for patient age. Empty appearing sella.    VISUALIZED ORBITS/SINUSES/MASTOIDS: No intraorbital abnormality. No paranasal sinus mucosal disease. No middle ear or mastoid effusion.    BONES/SOFT TISSUES: Bilateral fairly diffuse parotid gland punctate calcifications. No scalp hematoma. No skull fracture.      Impression    IMPRESSION:   1.  Negative for acute intracranial hemorrhage, transcortical infarct, hydrocephalus, or sizable intracranial mass. No skull fracture.  2.  Bilateral fairly diffuse parotid gland calcifications. Diagnostic considerations include sequelae from Sjogren's disease.  3.  Nonspecific empty appearing sella. Diagnostic considerations include sequelae from intracranial hypertension.   XR Foot Right 2 Views    Narrative    EXAM: XR FOOT RIGHT 2 VIEWS  LOCATION: [facility]  DATE: 2/13/2025    INDICATION: right pinky toe   r o osteonecrosis  COMPARISON: None.      Impression    IMPRESSION: No acute displaced fracture or subluxation. No discrete erosion. Small plantar calcaneal spur. Trace Achilles enthesophyte.   XR Abdomen 1 View    Narrative    EXAM: XR ABDOMEN 1 VIEW  LOCATION: [facility]  DATE: 2/13/2025    INDICATION: Abdominal pain and nausea.  COMPARISON: None.      Impression    IMPRESSION: Nonobstructive bowel gas pattern with no gross organomegaly nor urinary tract calculus. Mild stool burden.     Recent Labs   Lab 02/14/25  0635 02/13/25  0948 02/12/25  2338   WBC 3.3* 5.1 5.4   HGB 10.2* 12.2 11.7   MCV 81 82 79    290 292    143 138   POTASSIUM 3.5 3.6 3.6   CHLORIDE 106 106 99   CO2 28 28 27   BUN 4.8* 9.4 12.1   CR 0.55 0.55 0.54    ANIONGAP 8 9 12   CLARITA 8.4* 8.8 9.1   GLC 79 110* 109*   ALBUMIN 3.3* 3.9 3.9   PROTTOTAL 5.9* 7.1 7.2   BILITOTAL 0.4 0.3 0.2   ALKPHOS 62 78 96   ALT 9 11 11   AST 15 14 18   LIPASE  --  8* 18

## 2025-02-14 NOTE — CONSULTS
OPHTHALMOLOGY CONSULT NOTE  02/14/25  I have not seen or examined the patient, but was available if the need had arisen. I have reviewed the chart and key elements of this encounter and I concur with the resident's assessment and plan with the following summary/additions:    Isolated partially empty sella is relative common findings and is highly unlikely to be a presentation of increased intracranial pressure in isolation.    - Ophthalmology will sign off    Please direct all questions or concerns to the on-call ophthalmology resident.    Thomas Ulloa MD   Fellow, Neuro-ophthalmology     Patient: Juana Madrigal  Reason for Consult: Empty Sella, incidental finding    ASSESSMENT/PLAN:     #Incidental Empty Sella on CT  #No optic nerve head edema  Juana Madrigal is a 34 year old female with hx of opiate use disorder and RA. Empty sella incidentally noted on CT head in the setting of n/v. Ophthalmology consulted to evaluate for optic nerve head edema. ROS negative for IIH apart from patient noting slight decrease in hearing in right ear +/- tinnitus (unclear patient very sleepy) but non-pulsatile in nature. Normal eye exam. No optic nerve head edema. No evidence of increased intracranial pressure on ophthalmic exam.    - Ophthalmology will sign off    Patient discussed with Dr. Giraldo, Neuro Ophthalmology Fellow    Doroteo Way MD  Resident Physician, PGY-2  Department of Ophthalmology    HISTORY OF PRESENTING ILLNESS:     Juana Madrigal is a 34 year old female with PMH of RA and opiate use disorder among others who was admitted for nausea and vomiting. States she last used fentanyl about five days ago, but may have had an accidental exposure to fentanyl via her food about two days ago. Tox screen also positive for amphetamines and fentanyl. Not currently taking medications for RA. Started on keflex on 2/12/25 for treatment of right fifth toe infection at [facility]  "ED.    Denies recent weight gain, denies current headache (no positional component with prior headache on arrival), was nauseous on arrival but no longer, no diplopia, no scotoma, no visual field constriction, no reduction in visual acuity, no color changes. Denies new flashes, floaters, eye pain, redness, pain with EOMs. Denies use of birth control medication. No identified exposure to tetracyclines.      OCULAR/MEDICAL/SURGICAL HISTORIES:     Past Ocular History:  Last eye exam: None  Prior eye surgery/laser: Right eye corneal foreign body removal in childhood  Contact lens wear: None  Glasses: None  Eyedrops: None    Pertinent Systemic Medications:   -Keflex 500mg BID  -Buprenorphine ER (Brixadi)    Past Medical History:  Past Medical History:   Diagnosis Date    Arthritis     GERD (gastroesophageal reflux disease)     Rheumatoid arthritis (H)     Uncomplicated asthma        Past Surgical History:   No past surgical history on file.    Family History: \"Not that I know of\" re: glaucoma and AMD    Social History:  Social Drivers of Health     Food Insecurity: At Risk (2025)    Received from [employer]    Food [employer]     Food: 2   Depression: At risk (2024)    PHQ-2     PHQ-2 Score: 6   Housing Stability: At Risk (2024)    Received from [employer]    Housing Stability     Housin   Tobacco Use: High Risk (2025)    Patient History     Smoking Tobacco Use: Some Days     Smokeless Tobacco Use: Never     Passive Exposure: Not on file   Financial Resource Strain: Not on file   Alcohol Use: Not on file   Transportation Needs: Not on file   Physical Activity: Not on file   Interpersonal Safety: Not At Risk (2025)    Received from [facility]     Humiliation, Afraid, Rape, and Kick questionnaire     Fear of Current or Ex-Partner: No     Emotionally Abused: No     Physically Abused: No     Sexually Abused: No   Stress: Not on file   Social Connections: Not on file   Health Literacy: Not on file "       EXAMINATION:     Base Eye Exam       Visual Acuity (Snellen - Linear)         Right Left    Near sc 20/30 20/25-1    Near cc 20/20 20/20   CC = Pinhole             Tonometry (Tonopen, 11:52 AM)         Right Left    Pressure 17 16              Pupils         Pupils React APD    Right PERRL Brisk None    Left PERRL Brisk None              Visual Fields         Left Right     Full Full              Extraocular Movement         Right Left     Full, Ortho Full, Ortho              Dilation       Both eyes: 1.0% Mydriacyl, 2.5% Liu Synephrine @ 11:52 AM                  Additional Tests       Color         Right Left    Ishihara 16/16 16/16                  Slit Lamp and Fundus Exam       Portable Slit Lamp Exam         Right Left    Lids/Lashes MGD MGD    Conjunctiva/Sclera White and quiet White and quiet    Cornea Small circular 0.5mm midstromal scar at 4 o'clock mid periphery - patient reports hx right eye foreign body removal in childhood Clear    Anterior Chamber Deep and quiet Deep and quiet    Iris Round and reactive Round and reactive    Lens Clear Clear    Anterior Vitreous Normal Normal              Fundus Exam         Right Left    Disc Normal Normal    C/D Ratio 0.3 0.3    Macula Normal Normal    Vessels Normal Normal    Periphery Normal Normal                    Labs/Studies/Imaging Performed:  HIV: Negative  B HCG: negative  HgbA1C 5.6  EtOH: Negative  ESR 2/12/25: 26 (in setting of foot infection)  CRP: 17.19 (in setting of foot infection)     Head CT w/o contrast (02/13/2025 4:47 PM)   FINDINGS:  INTRACRANIAL CONTENTS: No intracranial hemorrhage or mass effect. No CT evidence of acute infarct. Preserved parenchymal attenuation for patient age. Preserved ventricles and sulci for patient age. Empty appearing sella.     VISUALIZED ORBITS/SINUSES/MASTOIDS: No intraorbital abnormality. No paranasal sinus mucosal disease. No middle ear or mastoid effusion.     BONES/SOFT TISSUES: Bilateral fairly diffuse  parotid gland punctate calcifications. No scalp hematoma. No skull fracture.                                                                      IMPRESSION:   1.  Negative for acute intracranial hemorrhage, transcortical infarct, hydrocephalus, or sizable intracranial mass. No skull fracture.  2.  Bilateral fairly diffuse parotid gland calcifications. Diagnostic considerations include sequelae from Sjogren's disease.  3.  Nonspecific empty appearing sella. Diagnostic considerations include sequelae from intracranial hypertension.    Doroteo Way MD  Resident Physician, PGY-2  Department of Ophthalmology  February 14, 2025

## 2025-02-14 NOTE — PLAN OF CARE
Goal Outcome Evaluation:      VS: /66   Pulse 93   Temp 98.9  F (37.2  C)   Resp 18   LMP 01/24/2025   SpO2 100%      O2: Stable ORA   Output: Voids spontaneously   Last BM: 2/13/25   Activity: SBA   Up for meals? Yes   Skin: Rt toe abrasion, dry   Pain: Denies pain   CMS: AO x4   Dressing: None   Diet: Clear liquid   LDA: Left PIV SL   Equipment: None   Plan: Continue with POC   Additional Info:

## 2025-02-14 NOTE — DISCHARGE INSTRUCTIONS
IOP [place] treatment options:  [facility] IOP:  Phone: [phone]  [email or web address]                [place] Co-occurring IOP: M,W, TH 9am-12pm (Accepts Medicare)                [place] Co-occurring IOP Morning:  M,T,W 9am-12:00pm                [place] Co-occurring IOP Evening: M,T,W 5:00pm-8:00pm (accepts Medicare)                [facility] Evening IOP: M,T, Th 5:30-7:30 W 5:30-8:30                [facility] IOP: M,T,W 3:30pm-5:30pm and Th 3:30pm-6:30pm                  [facility] IOP: M,W, TH 9am-12pm (ages: 18-26)    [facility]: (in-person & virtual)  [address],   [place] [address].  Phone: [phone]  Fax: [phone]  email:  [email or web address]  [email or web address]  *Virtual Day Group: (16-18 weeks long & co-occurring)  Mondays, Wednesdays, Thursdays 11:00am - 1:00pm and Tuesdays 10:00am - 12:00pm.      [facility] [place] (in-person & virtual)  [address] [place] [address]  Phone: [phone]  Fax: [phone]  Email: [email or web address]  [email or web address]  Day program meets Monday-Thursday from 8:30am-12:30pm  Evening program meets Monday-Thursday from 6pm-9pm  Virtual Group: Monday-Friday 6pm-9pm    [facility]: (in-person & virtual)  Locations: [facility] [place], [facility] [place], and [facility].  Phone: [phone]  Fax:  [phone]  Email: [email or web address]  email: [email or web address]  [email or web address]  *Virtual IOP:  Day program: Monday through Thursday from 9 a - noon.   Evening program: Monday through Thursday from 5 p - 8 p.    [facility]: (in-person & virtual)  Main phone: [phone]  Direct Dial: [phone]   Admissions email: [email or web address]   [place] fax: [phone]  [email or web address]    [facility]: (virtual &  in-person)  Phone: [phone]  Fax: [phone]  Admission Specialist: Emmanuelle Galo  Phone: [phone]  email: [email or web address]  [email or web address]  Groups:  1) Morning IOP + Mother's Group: Mon-Thurs 9am-noon  2) Morning In Person Group: Mon-Thurs 9am-noon  3) Morning Telehealth Group: Mon-Thurs 9am-noon  4) Afternoon Telehealth Group: Mon-Thurs 1-3pm  5) Evening Group: Mon-Thurs 5-8pm    Reflections: men & women IOP  [facility]: [address] [place] [address]  [facility]: [address] [place] [address]  [facility]: [address] [place] [address]  Intake & Assessment Appointments: [phone]  Referral Fax # [phone]  Referral Email: [email or web address]  Faye Cell: [phone]  IOP Group: Monday -Thursday 8:00am - 12:40pm  IOP Virtual Group: Monday-Thursday 8am-12:40pm  [email or web address]    [facility] IOP[place]  [address]  [place] [address]  Phone: [phone]  Fax: [phone]  email: [email or web address]  [email or web address]  Monday - Thursday 5:30 pm - 8:30 pm, Friday 11:00 am - 1:00 pm

## 2025-02-14 NOTE — CARE PLAN
8MS Admission Note    Reason for admission: N/V and R-toe infection   Primary team notified of pt arrival.  Admitted from:  ED  Via: Transport   Accompanied by: Staff  Belongings: Placed in closet; valuables at bedside  Admission Required Doc Completed: No  Mobility Devices Utilized by Patient Provided (i.e. walker, wheelchair, etc.): Yes  Teaching: Orientation to unit and call light- call light within reach, use of console, meal times, when to call for the RN, and enforced importance of safety.  IV Access: L-AC  Telemetry: No  Ht./Wt.: Refused  Code Status verified on armband: Yes  2 RN Skin Assessment Completed with: Charge RN  Suction/Ambu bag/Flowmeter at bedside: Yes    Pt status: Pt alert to self and situation. Pt refusing to answer assessment questions. Noted to be lethargic and wanting to sleep. Accepted skin check, skin WNL. Right small/5th toe quickly accessed, as pt was refusing and got upset. Pt noted pain and tenderness on foot. She noted that assessment and full vitals can be completed later. Pt sleeping through the night comfortably. Pt up this morning, noted to be unstable, assist of 1 to the bathroom, bed alarm on maintained for safety. Walker in room to assist with mobility.     No N/V, No diarrhea, COWS not completed, Not able to assess pain. Pt on PO ABX. Continue with POC.      /64 (BP Location: Left arm)   Pulse 104   Temp 99.1  F (37.3  C) (Oral)   Resp 18   LMP 01/24/2025   SpO2 97%

## 2025-02-15 LAB
ANION GAP SERPL CALCULATED.3IONS-SCNC: 10 MMOL/L (ref 7–15)
BUN SERPL-MCNC: 4.5 MG/DL (ref 6–20)
CALCIUM SERPL-MCNC: 9 MG/DL (ref 8.8–10.4)
CHLORIDE SERPL-SCNC: 103 MMOL/L (ref 98–107)
CORTIS SERPL-MCNC: 9 UG/DL
CREAT SERPL-MCNC: 0.49 MG/DL (ref 0.51–0.95)
EGFRCR SERPLBLD CKD-EPI 2021: >90 ML/MIN/1.73M2
ERYTHROCYTE [DISTWIDTH] IN BLOOD BY AUTOMATED COUNT: 16.2 % (ref 10–15)
FERRITIN SERPL-MCNC: 39 NG/ML (ref 6–175)
GLUCOSE SERPL-MCNC: 90 MG/DL (ref 70–99)
HBV CORE AB SERPL QL IA: NONREACTIVE
HCO3 SERPL-SCNC: 29 MMOL/L (ref 22–29)
HCT VFR BLD AUTO: 33.9 % (ref 35–47)
HCV AB SERPL QL IA: NONREACTIVE
HGB BLD-MCNC: 11.2 G/DL (ref 11.7–15.7)
HOLD SPECIMEN: NORMAL
IRON BINDING CAPACITY (ROCHE): 234 UG/DL (ref 240–430)
IRON SATN MFR SERPL: 14 % (ref 15–46)
IRON SERPL-MCNC: 32 UG/DL (ref 37–145)
MCH RBC QN AUTO: 26.7 PG (ref 26.5–33)
MCHC RBC AUTO-ENTMCNC: 33 G/DL (ref 31.5–36.5)
MCV RBC AUTO: 81 FL (ref 78–100)
PLATELET # BLD AUTO: 301 10E3/UL (ref 150–450)
POTASSIUM SERPL-SCNC: 3.6 MMOL/L (ref 3.4–5.3)
RBC # BLD AUTO: 4.19 10E6/UL (ref 3.8–5.2)
SODIUM SERPL-SCNC: 142 MMOL/L (ref 135–145)
WBC # BLD AUTO: 4.5 10E3/UL (ref 4–11)

## 2025-02-15 PROCEDURE — 80048 BASIC METABOLIC PNL TOTAL CA: CPT | Performed by: INTERNAL MEDICINE

## 2025-02-15 PROCEDURE — 120N000002 HC R&B MED SURG/OB UMMC

## 2025-02-15 PROCEDURE — 82728 ASSAY OF FERRITIN: CPT | Performed by: INTERNAL MEDICINE

## 2025-02-15 PROCEDURE — 86481 TB AG RESPONSE T-CELL SUSP: CPT

## 2025-02-15 PROCEDURE — 250N000012 HC RX MED GY IP 250 OP 636 PS 637: Performed by: INTERNAL MEDICINE

## 2025-02-15 PROCEDURE — 99232 SBSQ HOSP IP/OBS MODERATE 35: CPT | Performed by: INTERNAL MEDICINE

## 2025-02-15 PROCEDURE — 85027 COMPLETE CBC AUTOMATED: CPT | Performed by: INTERNAL MEDICINE

## 2025-02-15 PROCEDURE — 83550 IRON BINDING TEST: CPT | Performed by: INTERNAL MEDICINE

## 2025-02-15 PROCEDURE — 250N000013 HC RX MED GY IP 250 OP 250 PS 637

## 2025-02-15 PROCEDURE — 36415 COLL VENOUS BLD VENIPUNCTURE: CPT | Performed by: INTERNAL MEDICINE

## 2025-02-15 PROCEDURE — 83540 ASSAY OF IRON: CPT | Performed by: INTERNAL MEDICINE

## 2025-02-15 PROCEDURE — 82310 ASSAY OF CALCIUM: CPT | Performed by: INTERNAL MEDICINE

## 2025-02-15 RX ORDER — FERROUS GLUCONATE 324(38)MG
324 TABLET ORAL
Status: DISCONTINUED | OUTPATIENT
Start: 2025-02-16 | End: 2025-02-18 | Stop reason: HOSPADM

## 2025-02-15 RX ADMIN — CEPHALEXIN 500 MG: 500 CAPSULE ORAL at 20:18

## 2025-02-15 RX ADMIN — PREDNISONE 20 MG: 20 TABLET ORAL at 09:09

## 2025-02-15 RX ADMIN — CEPHALEXIN 500 MG: 500 CAPSULE ORAL at 09:09

## 2025-02-15 ASSESSMENT — ACTIVITIES OF DAILY LIVING (ADL)
ADLS_ACUITY_SCORE: 29
DEPENDENT_IADLS:: INDEPENDENT
ADLS_ACUITY_SCORE: 29
ADLS_ACUITY_SCORE: 29
ADLS_ACUITY_SCORE: 53
ADLS_ACUITY_SCORE: 55
ADLS_ACUITY_SCORE: 29
ADLS_ACUITY_SCORE: 55
ADLS_ACUITY_SCORE: 29
ADLS_ACUITY_SCORE: 55
ADLS_ACUITY_SCORE: 29
ADLS_ACUITY_SCORE: 53
ADLS_ACUITY_SCORE: 29

## 2025-02-15 NOTE — PLAN OF CARE
Goal Outcome Evaluation:      Plan of Care Reviewed With: patient    Overall Patient Progress: no changeOverall Patient Progress: no change    Shift 7118-3086    Temp: 99  F (37.2  C) Temp src: Oral BP: 104/72 Pulse: 83   Resp: 18 SpO2: 99 % O2 Device: None (Room air)    No acute events overnight. Pt is somnolent but easily arousable and cooperative. AxOx4. Denies SOB, cp, or cough. Denies pain and n/v. Took pm medication without difficulty. No other medical concern. Fall precaution and delirium precaution. POC ongoing.
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
## 2025-02-15 NOTE — PLAN OF CARE
Goal Outcome Evaluation:      Plan of Care Reviewed With: patient    Overall Patient Progress: no changeOverall Patient Progress: no change     Pt anticipates discharge to shelter.

## 2025-02-15 NOTE — PROGRESS NOTES
"[facility] Inpatient Rheumatology Progress    Patient name: Juana Madrigal  YOB: 1990  MRN: [id number]    Reason for consult: \"Known RA with social determinants limiting access to medications, has not taken medications recently, recent flares\"    =============================================================    Assessment and Plan:     # Rheumatoid arthritis, seropositive (CCP, RF)  # Diffuse joint pain, pain in balls of feet  # Paronychia R 5th toe  # Opioid use disorder  # Housing insecurity    Patient relates modest improvement in diffuse joint pain.  Still has nausea.  Exam shows diminished synovitis at MCPs and reduced tenderness at wrists.    Data:  Hepatitis B core antibody negative; quant Farren gold pending.    Impression: Seropositive rheumatoid arthritis at high risk of progression of subluxations and anatomic changes in the hands.  Disease is active.  Due to current toe infection, I recommend delaying restart of previously successful combination therapy with methotrexate and etanercept.    I recommend continuing the following plan:  - Prednisone 20mg daily x 7 days followed by 15mg daily x 7 days for joint pain/RA flare  -Upon discharge from hospital  A) resume etanercept 50 mg subcutaneous once weekly  B) resume methotrexate 8 tablets (20 mg) once weekly. While on methotrexate:   -- Check blood tests every 3 months (AST/ALT, Albumin, CBC with platelets)   -- Limit alcohol intake to 2 drinks weekly; use folate 1 mg daily.  --Tylenol 500-1000 mg can be used as needed up to three times daily for nausea/headache associated with dosing.    Follow-up in rheumatology clinic with Dr. Buchanan in 3 to 6 weeks.  Please place formal outpatient rheumatology consultation.  Rheumatology RN care coordinators will contact patient to arrange follow-up visit in outpatient clinic at [facility] or other [place] rheumatology clinic site.    Please contact rheumatology with questions.    Devendra" ANDREWS Buchanan.  Staff Rheumatologist  Health  Pager [phone]    On the day of the encounter, a total of 30 minutes was spent in chart review, and in counseling and coordination of care, regarding the patient's complex medical problem of rheumatoid arthritis, opiate use disorder, high risk medication.        Latest Ref Rng & Units 2/13/2025     9:48 AM 2/14/2025     6:35 AM 2/15/2025     8:22 AM   RHEUM RESULTS   Albumin 3.5 - 5.2 g/dL 3.9  3.3     ALT 0 - 50 U/L 11  9     AST 0 - 45 U/L 14  15     Creatinine 0.51 - 0.95 mg/dL 0.55  0.55  0.49    GFR Estimate >60 mL/min/1.73m2 >90  >90  >90    Hematocrit 35.0 - 47.0 % 37.5  32.3  33.9    Hemoglobin 11.7 - 15.7 g/dL 12.2  10.2  11.2    WBC 4.0 - 11.0 10e3/uL 5.1  3.3  4.5    RBC Count 3.80 - 5.20 10e6/uL 4.60  3.97  4.19    RDW 10.0 - 15.0 % 16.5  16.5  16.2    MCHC 31.5 - 36.5 g/dL 32.5  31.6  33.0    MCV 78 - 100 fL 82  81  81    Platelet Count 150 - 450 10e3/uL 290  266  301         ,  ,  ,  ,  ,  ,  ,  ,  ,  ,  ,  ,  ,  ,   Hepatitis B Core Antibody Total   Date Value Ref Range Status   02/13/2025 Nonreactive Nonreactive Final     Comment:     Nonreactive hepatitis B core antibody test results indicate the absence of exposure to hepatitis B virus and no evidence of recent, past/resolved, or chronic hepatitis B.    ,  ,  ,  ,  ,  ,  ,  ,  ,  ,  ,  ,  ,  ,  ,  ,  ,  ,  ,  ,  ,  ,  ,  ,  ,  ,  ,  ,  ,  ,       ==============================================================       Objective:  /74 (BP Location: Left arm)   Pulse 95   Temp 98.4  F (36.9  C) (Oral)   Resp 18   Wt 66.1 kg (145 lb 11.6 oz)   LMP 01/24/2025   SpO2 100%   BMI 21.52 kg/m    GEN: sitting up unassisted, NAD, fatigued  HEENT: No facial rash, sclera clear, no oral or nasal ulcers  Pulm: No increased work of breathing on room air  Extremities: R 5th toe with minor serous discharge  Skin: No acute cutaneous changes         Medications:  Current Facility-Administered Medications    Medication Dose Route Frequency Provider Last Rate Last Admin    acetaminophen (TYLENOL) tablet 650 mg  650 mg Oral Q4H PRN Will Zapata PA-C        Or    acetaminophen (TYLENOL) Suppository 650 mg  650 mg Rectal Q4H PRN Will Zapata PA-C        albuterol (PROVENTIL HFA/VENTOLIN HFA) inhaler  2 puff Inhalation Q4H PRN Will Zapata PA-C        cephALEXin (KEFLEX) capsule 500 mg  500 mg Oral BID Will Zapata PA-C   500 mg at 02/15/25 0909    cloNIDine (CATAPRES) tablet 0.1 mg  0.1 mg Oral Q6H PRN Will Zapata PA-C        dicyclomine (BENTYL) capsule 20 mg  20 mg Oral TID PRN Will Zapata PA-C        loperamide (IMODIUM) capsule 2 mg  2 mg Oral 4x Daily PRN Will Zapata PA-C        polyethylene glycol (MIRALAX) Packet 17 g  17 g Oral BID PRN Will Zapata PA-C        predniSONE (DELTASONE) tablet 20 mg  20 mg Oral Daily David Pruett MD   20 mg at 02/15/25 0909    prochlorperazine (COMPAZINE) injection 10 mg  10 mg Intravenous Q6H PRN Will Zapata PA-C        Or    prochlorperazine (COMPAZINE) tablet 10 mg  10 mg Oral Q6H PRN Will Zapata PA-C        senna-docusate (SENOKOT-S/PERICOLACE) 8.6-50 MG per tablet 1 tablet  1 tablet Oral BID PRN Will Zapata PA-C        Or    senna-docusate (SENOKOT-S/PERICOLACE) 8.6-50 MG per tablet 2 tablet  2 tablet Oral BID PRN Will Zapata PA-C           Allergies:  No Known Allergies    Immunizations:  Immunization History   Administered Date(s) Administered    COVID-19 MONOVALENT 12+ (Pfizer) 03/25/2021, 04/15/2021       Social History:   reports that she has been smoking. She has never used smokeless tobacco. She reports that she does not currently use alcohol. She reports current drug use. Drug: Marijuana.    Family history:  No family history of autoimmune disease    Labs and Imaging: Reviewed as in synopsis above

## 2025-02-15 NOTE — PLAN OF CARE
Goal Outcome Evaluation:      VS: /67 (BP Location: Left arm)   Pulse 90   Temp 98.1  F (36.7  C) (Oral)   Resp 18   Wt 66.1 kg (145 lb 11.6 oz)   LMP 01/24/2025   SpO2 100%   BMI 21.52 kg/m       O2: Stable ORA   Output: Voids spontaneously   Last BM: 2/15/25   Activity: Independent   Up for meals? Yes   Skin: Right toe abrasion   Pain: Joint pain   CMS: AO x4   Dressing: None   Diet: Regular   LDA: Left PIV SL   Equipment: None   Plan: Continue with POC   Additional Info:

## 2025-02-16 LAB
GAMMA INTERFERON BACKGROUND BLD IA-ACNC: 0.08 IU/ML
M TB IFN-G BLD-IMP: NEGATIVE
M TB IFN-G CD4+ BCKGRND COR BLD-ACNC: 9.92 IU/ML
MITOGEN IGNF BCKGRD COR BLD-ACNC: 0 IU/ML
MITOGEN IGNF BCKGRD COR BLD-ACNC: 0 IU/ML
QUANTIFERON MITOGEN: 10 IU/ML
QUANTIFERON NIL TUBE: 0.08 IU/ML
QUANTIFERON TB1 TUBE: 0.08 IU/ML
QUANTIFERON TB2 TUBE: 0.08

## 2025-02-16 PROCEDURE — 250N000013 HC RX MED GY IP 250 OP 250 PS 637

## 2025-02-16 PROCEDURE — 250N000012 HC RX MED GY IP 250 OP 636 PS 637: Performed by: INTERNAL MEDICINE

## 2025-02-16 PROCEDURE — 99232 SBSQ HOSP IP/OBS MODERATE 35: CPT | Performed by: INTERNAL MEDICINE

## 2025-02-16 PROCEDURE — 120N000002 HC R&B MED SURG/OB UMMC

## 2025-02-16 PROCEDURE — 250N000013 HC RX MED GY IP 250 OP 250 PS 637: Performed by: INTERNAL MEDICINE

## 2025-02-16 RX ADMIN — PREDNISONE 20 MG: 20 TABLET ORAL at 10:48

## 2025-02-16 RX ADMIN — FERROUS GLUCONATE 324 MG: 324 TABLET ORAL at 10:48

## 2025-02-16 RX ADMIN — CEPHALEXIN 500 MG: 500 CAPSULE ORAL at 10:48

## 2025-02-16 RX ADMIN — CEPHALEXIN 500 MG: 500 CAPSULE ORAL at 19:59

## 2025-02-16 ASSESSMENT — ACTIVITIES OF DAILY LIVING (ADL)
ADLS_ACUITY_SCORE: 29

## 2025-02-16 NOTE — PLAN OF CARE
Goal Outcome Evaluation:      VS: /77 (BP Location: Left arm)   Pulse 76   Temp 98.6  F (37  C) (Oral)   Resp 16   Wt 65.9 kg (145 lb 4.8 oz)   LMP 01/24/2025   SpO2 100%   BMI 21.46 kg/m       O2: Stable ORA   Output: Voids without difficulties   Last BM: 2/15/25   Activity: Independent   Up for meals? Yes   Skin: Right toe abrasion    Pain: Denies pain   CMS: AO x4   Dressing: None   Diet: Regular   LDA: left PIV SL   Equipment: None   Plan: Continue with POC   Additional Info: Chem dep consult on 2/17/25

## 2025-02-16 NOTE — PLAN OF CARE
Goal Outcome Evaluation:    Plan of Care Reviewed With: patient  Overall Patient Progress: no changeOverall Patient Progress: no change  Shift 6069-3492  No acute event overnight. Pt is AxOx4, denies sob, cp, and cough. Pt reported on and off nausea and decline PRN medication. No pain reported. Wound care provided. No BM during the shift. Not oob. Slept throughout the night. No other medical concern. POC ongoing.  Temp: 98.4  F (36.9  C) Temp src: Oral BP: 109/77 Pulse: 67   Resp: 18 SpO2: 96 % O2 Device: None (Room air)
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
## 2025-02-16 NOTE — PROGRESS NOTES
Care Management Follow Up    Length of Stay (days): 3    Expected Discharge Date: 02/14/2025     Concerns to be Addressed: discharge planning     Patient plan of care discussed at interdisciplinary rounds: Yes    Anticipated Discharge Disposition: Shelter vs IP CLINT treatment   Anticipated Discharge Services: Transportation Services, Chemical Dependency Resources  Anticipated Discharge DME: None    Patient/family educated on Medicare website which has current facility and service quality ratings: no  Education Provided on the Discharge Plan: Yes  Patient/Family in Agreement with the Plan: yes    Referrals Placed by CM/SW: Internal Clinic Care Coordination, Specialty Providers, Transportation, Community Resources  Private pay costs discussed: Not applicable    Discussed  Partnership in Safe Discharge Planning  document with patient/family: No     Handoff Completed: No, handoff not indicated or clinically appropriate    Additional Information:    Per provider during rounds, patient is now interested in inpatient chemical dependency treatment. Chem Dep's note states they plan on seeing her Monday, 2/17 and they can assist in her discharging to a treatment facility.    Next Steps:   - Care Management to follow to support in discharge planning.    Fatou Funez, MSW \  2/16/2025       Social Work and Care Management Department       SEARCHABLE in [facility] - search SOCIAL WORK       [place] (0800 - 1630) Saturday and Sunday     Units: 4A Vocera, 4C Vocera, & 4E Vocera        Units: 5A 4052-5720 Vocera, 5A 4302-3262 Vocera , BMT SW 1 BMT SW 2, BMT SW 3 & BMT SW 4  5C Off Service 5401 - 5416  5C Off Service [id number]     Units: 6A Vocera & 6B Vocera      Units: 6C Vocera     Units: 7A Vocera & 7B Vocera      Units: 7C Med Surg 7401 thru 7418 and 7C Med Surg 7502 thru 7521      Unit: [place] ED Vocera & [place] Obs Vocera     [facility] ([id number]) Saturday and Sunday      Units: 5 Ortho Vocera, 5 Med Surg  Vocera & WB ED Vocera     Units: 6 Med Surg Vocera, 8 Med Surg Vocera, & 10 ICU Vocera      After hours Vocera [place] and After Hours Vocera [place]     Please NOTE changes to times below:    **Saturday & Sunday (1630 - 2030)    **Mon-Fri ([id number])     **[facility] Recognized Holidays  ([id number])    Units: ALL   - see above VOCERA links to units

## 2025-02-17 ENCOUNTER — TELEPHONE (OUTPATIENT)
Dept: RHEUMATOLOGY | Facility: CLINIC | Age: 35
End: 2025-02-17
Payer: COMMERCIAL

## 2025-02-17 ENCOUNTER — DOCUMENTATION ONLY (OUTPATIENT)
Dept: MEDSURG UNIT | Facility: CLINIC | Age: 35
End: 2025-02-17
Payer: COMMERCIAL

## 2025-02-17 PROCEDURE — 99239 HOSP IP/OBS DSCHRG MGMT >30: CPT | Performed by: INTERNAL MEDICINE

## 2025-02-17 PROCEDURE — 250N000013 HC RX MED GY IP 250 OP 250 PS 637

## 2025-02-17 PROCEDURE — 250N000013 HC RX MED GY IP 250 OP 250 PS 637: Performed by: INTERNAL MEDICINE

## 2025-02-17 PROCEDURE — 250N000012 HC RX MED GY IP 250 OP 636 PS 637: Performed by: INTERNAL MEDICINE

## 2025-02-17 PROCEDURE — 120N000002 HC R&B MED SURG/OB UMMC

## 2025-02-17 RX ORDER — ACETAMINOPHEN 500 MG
1000 TABLET ORAL EVERY 8 HOURS PRN
Qty: 30 TABLET | Refills: 0 | Status: SHIPPED | OUTPATIENT
Start: 2025-02-17

## 2025-02-17 RX ORDER — CEPHALEXIN 500 MG/1
500 CAPSULE ORAL 2 TIMES DAILY
Qty: 20 CAPSULE | Refills: 0 | Status: SHIPPED | OUTPATIENT
Start: 2025-02-18 | End: 2025-02-28

## 2025-02-17 RX ORDER — GINSENG 100 MG
CAPSULE ORAL DAILY
OUTPATIENT
Start: 2025-02-17 | End: 2025-03-03

## 2025-02-17 RX ORDER — METHOTREXATE 2.5 MG/1
20 TABLET ORAL
Qty: 32 TABLET | Refills: 0 | Status: SHIPPED | OUTPATIENT
Start: 2025-02-24

## 2025-02-17 RX ORDER — FOLIC ACID 1 MG/1
1 TABLET ORAL DAILY
Qty: 30 TABLET | Refills: 11 | Status: SHIPPED | OUTPATIENT
Start: 2025-02-17

## 2025-02-17 RX ORDER — ONDANSETRON 8 MG/1
8 TABLET, ORALLY DISINTEGRATING ORAL EVERY 8 HOURS PRN
Qty: 20 TABLET | Refills: 0 | Status: SHIPPED | OUTPATIENT
Start: 2025-02-17

## 2025-02-17 RX ORDER — FERROUS GLUCONATE 324(38)MG
324 TABLET ORAL
Qty: 30 TABLET | Refills: 0 | Status: SHIPPED | OUTPATIENT
Start: 2025-02-18

## 2025-02-17 RX ORDER — POLYETHYLENE GLYCOL 3350 17 G/17G
1 POWDER, FOR SOLUTION ORAL DAILY
Qty: 578 G | Refills: 0 | Status: SHIPPED | OUTPATIENT
Start: 2025-02-17

## 2025-02-17 RX ORDER — AMOXICILLIN 250 MG
1 CAPSULE ORAL 2 TIMES DAILY PRN
Qty: 30 TABLET | Refills: 0 | Status: SHIPPED | OUTPATIENT
Start: 2025-02-17

## 2025-02-17 RX ORDER — CEPHALEXIN 500 MG/1
500 CAPSULE ORAL 2 TIMES DAILY
Qty: 10 CAPSULE | Refills: 0 | Status: SHIPPED | OUTPATIENT
Start: 2025-02-18 | End: 2025-02-17

## 2025-02-17 RX ORDER — ALBUTEROL SULFATE 90 UG/1
2 INHALANT RESPIRATORY (INHALATION) EVERY 4 HOURS PRN
Qty: 18 G | Refills: 0 | Status: SHIPPED | OUTPATIENT
Start: 2025-02-17 | End: 2025-03-19

## 2025-02-17 RX ORDER — PREDNISONE 20 MG/1
20 TABLET ORAL DAILY
Status: DISCONTINUED | OUTPATIENT
Start: 2025-02-18 | End: 2025-02-18 | Stop reason: HOSPADM

## 2025-02-17 RX ORDER — PREDNISONE 5 MG/1
TABLET ORAL
Qty: 29 TABLET | Refills: 0 | Status: SHIPPED | OUTPATIENT
Start: 2025-02-19 | End: 2025-02-28

## 2025-02-17 RX ADMIN — CEPHALEXIN 500 MG: 500 CAPSULE ORAL at 19:30

## 2025-02-17 RX ADMIN — FERROUS GLUCONATE 324 MG: 324 TABLET ORAL at 09:07

## 2025-02-17 RX ADMIN — CEPHALEXIN 500 MG: 500 CAPSULE ORAL at 09:07

## 2025-02-17 RX ADMIN — ACETAMINOPHEN 650 MG: 325 TABLET, FILM COATED ORAL at 09:09

## 2025-02-17 RX ADMIN — PREDNISONE 20 MG: 20 TABLET ORAL at 09:07

## 2025-02-17 ASSESSMENT — ACTIVITIES OF DAILY LIVING (ADL)
ADLS_ACUITY_SCORE: 29
ADLS_ACUITY_SCORE: 32
ADLS_ACUITY_SCORE: 29
ADLS_ACUITY_SCORE: 32
ADLS_ACUITY_SCORE: 32
ADLS_ACUITY_SCORE: 28
ADLS_ACUITY_SCORE: 28
ADLS_ACUITY_SCORE: 32
ADLS_ACUITY_SCORE: 29
ADLS_ACUITY_SCORE: 32
ADLS_ACUITY_SCORE: 28
ADLS_ACUITY_SCORE: 29
ADLS_ACUITY_SCORE: 28
ADLS_ACUITY_SCORE: 29
ADLS_ACUITY_SCORE: 29
ADLS_ACUITY_SCORE: 32
ADLS_ACUITY_SCORE: 32
ADLS_ACUITY_SCORE: 29
ADLS_ACUITY_SCORE: 28
ADLS_ACUITY_SCORE: 28
ADLS_ACUITY_SCORE: 29

## 2025-02-17 NOTE — DISCHARGE SUMMARY
[facility]  Hospitalist Discharge Summary      Date of Admission:  2/13/2025  Date of Discharge:  2/18/25  Discharging Provider: David Pruett MD  Discharge Service: Hospitalist Service, GOLD TEAM 16    Discharge Diagnoses   Toxic encephalopathy, likely drug induced encephalopathy.  Resolved.  Concern for opioid withdrawal   Polysubstance use disorder (meth, opioid, hx alcohol use)  Nausea, vomiting due to above  Infection of right 5th toe (paronychia)  Rheumatoid arthritis, not actively on meds at hospital admission   Peripheral neuropathy   Nonspecific empty sella   Leukopenia  Anemia, borderline microcytic  Asthma, mild-intermittent  Allergic rhinitis   H/O Depression  H/O ADHD   GERD   Minimally elevated TSH, normal Free T4   Housing insecurity            Follow-ups Needed After Discharge   Follow-up Appointments       ADULT [facility] Specialty Follow-up and recommended labs and tests      1. Follow up with Dr. Buchanan , in Rheumatology clinic, within 3-5 weeks to evaluate medication change for rheumatoid arthritis. The following labs/tests are recommended: CBC and platelets, LFTs every 3 months while on methotrexate.  2. Go to Recovery Clinic within the next week once recently administered Brixadi injection wears off.     Appointments on [place] and/or [facility] (with [facility] or [facility] provider or service). Call [phone] if you haven't heard regarding these appointments within 7 days of discharge.        Follow Up ([facility])      Follow up with primary care provider within 7 days for hospital follow- up, reassess right 5th toe paronychia.  The following labs/tests are recommended: CBC with platelets, LFTs, BMP.      Appointments on [place] and/or [facility] (with [facility] or [facility] provider or service). Call [phone] if you haven't heard regarding these appointments within 7 days of discharge.                    Discharge Disposition    Discharged to [facility]  Condition at discharge: Stable    Hospital Course   Juana Madrigal is a 34 year old female admitted on 2/13/2025. She has a past medical history of asthma, rheumatoid arthritis, GERD, allergic rhinitis, depression. ADHD, alcohol use disorder, and opioid use disorder. She presented to the ED with nausea and altered mental status; overall picture concerning for drug induced encephalopathy with likely opioid withdrawal. Was admitted to internal medicine for further assessment and monitoring.      Per chart review, patient was seen 2/12/25 at [facility] ED for multiple physical complaints including nausea, chronic pain, and bilateral foot pain.  She reported 4 days since last fentanyl use and was treated with Subutex, Brixadi, and Keflex for early soft tissue infection of toe.         # Toxic encephalopathy, likely drug induced encephalopathy   # Concern for opioid withdrawal   # Polysubstance use disorder (meth, opioid, hx alcohol use)  # Nausea, vomiting   Presented to the ED complaining of abdominal pain and nausea. On exam for ED provider, was found to be slow to answer and drowsy. Abdomen nontender and soft. With history of meth and opioid use, last use of fentanyl 2/08/25 and denies use since- with hx of recent opioid overdoses 1/09/25, 2/05/25 with subsequent ED visits.  Patient stated she was given beans and rice by a man and that she feels it could have been laced with meth but denies ingestion of meth on purpose. UDS with fentanyl and amphetamines. Also presented to ED 2/12/25 with nausea and vomiting; was given brixadi (weekly) and sublingual buprenorphine.  Head CT negative for acute findings, once again shows nonspecific empty sella.  UA bland, pregnancy test negative.  CBC, LFTs, BMP, procalcitonin, lipase normal on admission.  Influenza A/B, RSV, COVID-19 PCR testing negative.  HIV, hepatitis B antibodies negative.  Afebrile,  hemodynamically stable.  Nausea and vomiting have subsequently largely resolved and tolerating oral intake well since 2/14.  Hypersomnolence largely resolved now, fully oriented and appropriate and ambulating.  Neurologically intact. COWS scores 5-3.  Seen by psychiatry.  Chem Dep evaluation appreciated, patient has expressed strong interest in residential CD treatment and has been accepted at [facility] for 2/18/25.  -Psychiatry provided recovery clinic information and recommended that patient go there at the end of this week when her Brixadi injection wears off  -Per psychiatry, if patient develops cravings could consider adding additional 4-8 mg buprenorphine sublingually daily  -Plan discharge to inpatient CD treatment at [facility] 2/18.  30-day supply of medications ordered.      #Nonspecific empty sella:   Noted on CT head, previous CT head at [facility] 10/20/24 also noted partially empty chan with prominence of optic nerve that may suggest idiopathic intracranial hypertension.  Patient denies any issues with headaches or altered vision.  Neurologically intact on exam aside from being somewhat hypersomnolent.  Seen by ophthalmology 2/14/2025, normal eye exam, no optic nerve head edema, no evidence of increased intracranial pressure on ophthalmic exam.  Morning cortisol normal.  Has minimally elevated TSH with normal free T4.      # Infection of right 5th toe (paronychia)  Presented to ED 2/12/25 and noted painful right fifth toe, with 1 cm ulceration over the dorsum of the toe with scant amount of purulent drainage and some swelling.  Started 500 mg BID for 7 days on ED visit 2/12 but she did not continue taking. WBC, procalcitonin normal.  CRP elevated at 17.19 at this admission.  Afebrile.  X-ray 2/13 showed no acute displaced fracture or subluxation, no discrete erosion.  Pain, swelling and tenderness much improved.  Does have small amount of expressible drainage at edge of nail bed on 2/17, but tenderness markedly  "better.  -Given current prednisone and plans to initiate MTX and etanercept next week, and some ongoing mild drainage, will extend Keflex course for 10 more days at discharge  -Start bacitracin daily to the right fifth toe nailbed for 2 weeks  - Wound cares.  Patient in the process of getting better fitting shoes.  -Follow-up with PCP in 1 week with CBC, BMP, LFTs and reassessment.    # Rheumatoid arthritis   # Bilateral foot pain   # Peripheral neuropathy   On arrival, endorsed \"pins-and-needles\" sensation in hands and feet which has been going on for over a month. Known hx of RA and has not been taking her home medications (methotrexate and prednisone). 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 Previously on humira but this was discontinued given COVID concerns in 2022. Last plan was to continue methotrexate 10 mg, enbrel injection weekly, and prednisone.  Patient was last seen 2/26/24 by Dr. Christina.  B12, A1c normal.  Inpatient rheumatology consult obtained and greatly appreciated.  Pretreatment ID screening completed: HIV, hepatitis B serologies, hepatitis C antibody, Quant TB Gold negative.  Started prednisone 20 mg daily 2/14 per rheumatology recommendations.  -Continue prednisone 20 mg daily to complete 7 days followed by 15 mg daily for 7 days  -Per rheumatology recommendations, resume previously successful combination therapy with methotrexate 8 tablets (20 mg) once weekly, daily folic acid, and etanercept 50 mg subcutaneous once weekly at hospital discharge.    -Order placed to initiate the above medications starting Monday, 2/24/2025.  30 day supply ordered.  Working on PA for etanercept.  -Will need to limit alcohol intake to 2 drinks weekly, which further drives the importance of CD treatment  -CBC, BMP, LFTs in 1 week with PCP  -AST/ALT, albumin, CBC with platelets every 3 months while on " MTX  -Follow-up in rheumatology clinic with Dr. Buchanan in 3-6 weeks.  -Placed [facility] rheumatology referral at discharge. Rheumatology RN care coordinators will contact patient to arrange follow-up visit to an outpatient clinic at [facility] or other [place] rheumatology clinic site.      #Leukopenia  #Anemia, borderline microcytic:  WBC has historically been consistently normal, including at the time of admission 2/13/2025.  Mild intermittent anemia with borderline microcytosis.  Mild leukopenia, anemia 2/14, likely dilutional component following IV fluids.  No bleeding clinically.  Afebrile.  Hemoglobin improved 11.2, WBC 4.5 on 2/15.  Ferritin 39, decreased iron stores but decreased TIBC c/w ACD +/- ISAIAH.   -Started iron gluconate daily  -CBC in 1 week with PCP     # Asthma   Denies cough or dyspnea.  Afebrile, oxygenating well on room air.  Lungs CTA.  - PRN albuterol ordered      # Allergic rhinitis   - No longer taking flonase      # Depression  # ADHD   - No longer taking wellbutrin or sertraline      # GERD   - not on pta medication management      # Minimally elevated TSH  Minimally elevated TSH, normal free T4 on 2/12/25.     # Experiencing housing insecurity  - SW consulted                Consultations This Hospital Stay   SOCIAL WORK IP CONSULT  PSYCHIATRY IP CONSULT  CHEMICAL DEPENDENCY IP CONSULT  RHEUMATOLOGY IP CONSULT  OPHTHALMOLOGY IP CONSULT  CARE MANAGEMENT / SOCIAL WORK IP CONSULT    Code Status   Full Code    Time Spent on this Encounter   I, David Pruett MD, personally saw the patient today and spent greater than 30 minutes discharging this patient.       David Pruett MD  [facility] [address]  [address] [place] [address]  Phone: [phone]  Fax: [phone]  ______________________________________________________________________    Physical Exam   Vital Signs: Temp: 98.7  F (37.1  C) Temp src: Oral BP: 106/84 Pulse: 82   Resp: 20 SpO2: 95 % O2 Device: None  (Room air)    Weight: 145 lbs 4.8 oz  General: Alert and oriented x 4.  Very pleasant.  HEENT: Atraumatic.  PERRL.  EOM intact.  OP moist and clear.  Neck supple, nontender.  Chest: CTA bilaterally  CV: RRR.  No murmurs.  Abdomen: Normal active bowel sounds.  Soft, nontender nondistended.  Extremities, MS: No edema.  Small area of erythema, ulceration on the dorsum of the right fifth toe adjacent to the nailbed, surrounding skin clean.  No proximal tenderness, no pain with range of motion of the MTP.  Mild right elbow pain with range of motion, palpation.  No effusion, erythema or warmth.  No other definite active synovitis appreciable on exam.  Neuro: CN II through XII grossly intact.  Strength 5/5 symmetrically.  Normal finger-nose bilaterally.  No tremors.           Primary Care Physician   Physician No Ref-Primary    Discharge Orders      Adult Rheumatology  Referral      Reason for your hospital stay    Hypersomnolence, nausea and vomiting due to polysubstance abuse, opiate use disorder. Right 5th toe paronychia. Rheumatoid arthritis with recurrent flare due to being off medications.     Activity    Your activity upon discharge: activity as tolerated     ADULT [facility] Specialty Follow-up and recommended labs and tests    1. Follow up with Dr. Buchanan , in Rheumatology clinic, within 3-5 weeks to evaluate medication change for rheumatoid arthritis. The following labs/tests are recommended: CBC and platelets, LFTs every 3 months while on methotrexate.  2. Go to Recovery Clinic within the next week once recently administered Brixadi injection wears off.     Appointments on [place] and/or [facility] (with [facility] or [facility] provider or service). Call [phone] if you haven't heard regarding these appointments within 7 days of discharge.     Follow Up ([facility])    Follow up with primary care provider within 7 days for hospital follow- up, reassess right 5th toe paronychia.  The following labs/tests  are recommended: CBC with platelets, LFTs, BMP.      Appointments on [place] and/or [facility] (with [facility] or [facility] provider or service). Call [phone] if you haven't heard regarding these appointments within 7 days of discharge.     Full Code     Diet    Follow this diet upon discharge: Regular       Significant Results and Procedures   Most Recent 3 CBC's:  Recent Labs   Lab Test 02/15/25  0822 02/14/25  0635 02/13/25  0948   WBC 4.5 3.3* 5.1   HGB 11.2* 10.2* 12.2   MCV 81 81 82    266 290     Most Recent 3 BMP's:  Recent Labs   Lab Test 02/15/25  0822 02/14/25  0635 02/13/25  0948    142 143   POTASSIUM 3.6 3.5 3.6   CHLORIDE 103 106 106   CO2 29 28 28   BUN 4.5* 4.8* 9.4   CR 0.49* 0.55 0.55   ANIONGAP 10 8 9   CLARITA 9.0 8.4* 8.8   GLC 90 79 110*     Most Recent 2 LFT's:  Recent Labs   Lab Test 02/14/25  0635 02/13/25  0948   AST 15 14   ALT 9 11   ALKPHOS 62 78   BILITOTAL 0.4 0.3     Most Recent 3 INR's:No lab results found.  7-Day Micro Results       Collected Updated Procedure Result Status      02/15/2025 0822 02/16/2025 1017 Quantiferon TB Gold Plus Grey Tube [[id number]]   Peripheral Blood    Final result Component Value Units   Quantiferon Nil Tube 0.08 IU/mL            02/15/2025 0822 02/16/2025 1017 Quantiferon TB Gold Plus Green Tube [[id number]]   Peripheral Blood    Final result Component Value Units   Quantiferon TB1 Tube 0.08 IU/mL            02/15/2025 0822 02/16/2025 1017 Quantiferon TB Gold Plus Yellow Tube [[id number]]   Peripheral Blood    Final result Component Value   Quantiferon TB2 Tube 0.08            02/15/2025 0822 02/16/2025 1018 Quantiferon TB Gold Plus Purple Tube [[id number]]   Peripheral Blood    Final result Component Value Units   Quantiferon Mitogen 10.00 IU/mL            02/15/2025 0822 02/16/2025 1204 Quantiferon TB Gold Plus [[id number]]   Peripheral Blood    Final result Component Value Units   Quantiferon-TB Gold Plus Negative    No  interferon gamma response to M.tuberculosis antigens was detected. Infection with M.tuberculosis is unlikely, however a single negative result does not exclude infection. In patients at high risk for infection, a second test should be considered in accordance with the 2017 ATS/IDSA/CDC Clinical Pract  ice Guidelines for Diagnosis of Tuberculosis in Adults and Children    TB1 Ag minus Nil Value 0.00 IU/mL   TB2 Ag minus Nil Value 0.00 IU/mL   Mitogen minus Nil Result 9.92 IU/mL   Nil Result 0.08 IU/mL            02/13/2025 1736 02/14/2025 1412 Urine Culture [[id number]]   Urine from Urethra    Final result Component Value   Culture <10,000 CFU/mL Mixture of Urogenital Cristina               02/12/2025 2341 02/13/2025 0041 Influenza A/B, RSV and SARS-CoV2 PCR (COVID-19) Nose [[id number]]    Swab from Nose    Final result Component Value   Influenza A PCR Negative   Influenza B PCR Negative   RSV PCR Negative   SARS CoV2 PCR Negative   NEGATIVE: SARS-CoV-2 (COVID-19) RNA not detected, presumed negative.                  Most Recent TSH and T4:  Recent Labs   Lab Test 02/12/25 2338   TSH 6.36*   T4 1.09     Most Recent Hemoglobin A1c:  Recent Labs   Lab Test 02/13/25  0948   A1C 5.6     Most Recent Urinalysis:  Recent Labs   Lab Test 02/13/25  1736 03/03/17  1823 02/23/17  1901   COLOR Yellow   < > Yellow   APPEARANCE Clear   < > Clear   URINEGLC Negative   < > Negative   URINEBILI Negative   < > Small  This is an unconfirmed screening test result. A positive result may be false.  *   URINEKETONE Negative   < > Negative   SG 1.024   < > >1.030   UBLD Negative   < > Moderate*   URINEPH 5.5   < > 5.5   PROTEIN Negative   < > Negative   UROBILINOGEN  --   --  0.2   NITRITE Negative   < > Negative   LEUKEST Trace*   < > Negative   RBCU 1   < > 2-5*   WBCU 4   < > O - 2    < > = values in this interval not displayed.     Most Recent ESR & CRP:  Recent Labs   Lab Test 02/12/25  2338 06/30/17  1856   SED 26* 30*   CRP  --   6.7   CRPI 17.19*  --      Most Recent Anemia Panel:  Recent Labs   Lab Test 02/15/25  0822 02/14/25  0635 02/13/25  0948   WBC 4.5   < > 5.1   HGB 11.2*   < > 12.2   HCT 33.9*   < > 37.5   MCV 81   < > 82      < > 290   IRON 32*  --   --    IRONSAT 14*  --   --    *  --   --    DUNCAN 39  --   --    B12  --   --  320    < > = values in this interval not displayed.     Most Recent CPK:No lab results found.,   Results for orders placed or performed during the hospital encounter of 02/13/25   Head CT w/o contrast    Narrative    EXAM: CT HEAD W/O CONTRAST  LOCATION: [facility]  DATE: 2/13/2025    INDICATION: somluent, vomiting  COMPARISON: None.  TECHNIQUE: Routine CT Head without IV contrast. Multiplanar reformats. Dose reduction techniques were used.    FINDINGS:  INTRACRANIAL CONTENTS: No intracranial hemorrhage or mass effect. No CT evidence of acute infarct. Preserved parenchymal attenuation for patient age. Preserved ventricles and sulci for patient age. Empty appearing sella.    VISUALIZED ORBITS/SINUSES/MASTOIDS: No intraorbital abnormality. No paranasal sinus mucosal disease. No middle ear or mastoid effusion.    BONES/SOFT TISSUES: Bilateral fairly diffuse parotid gland punctate calcifications. No scalp hematoma. No skull fracture.      Impression    IMPRESSION:   1.  Negative for acute intracranial hemorrhage, transcortical infarct, hydrocephalus, or sizable intracranial mass. No skull fracture.  2.  Bilateral fairly diffuse parotid gland calcifications. Diagnostic considerations include sequelae from Sjogren's disease.  3.  Nonspecific empty appearing sella. Diagnostic considerations include sequelae from intracranial hypertension.   XR Foot Right 2 Views    Narrative    EXAM: XR FOOT RIGHT 2 VIEWS  LOCATION: [facility]  DATE: 2/13/2025    INDICATION: right pinky toe   r o osteonecrosis  COMPARISON:  None.      Impression    IMPRESSION: No acute displaced fracture or subluxation. No discrete erosion. Small plantar calcaneal spur. Trace Achilles enthesophyte.   XR Abdomen 1 View    Narrative    EXAM: XR ABDOMEN 1 VIEW  LOCATION: [facility]  DATE: 2/13/2025    INDICATION: Abdominal pain and nausea.  COMPARISON: None.      Impression    IMPRESSION: Nonobstructive bowel gas pattern with no gross organomegaly nor urinary tract calculus. Mild stool burden.       Discharge Medications   Current Discharge Medication List        START taking these medications    Details   etanercept (ENBREL SURECLICK) 50 MG/ML autoinjector Inject 50 mg subcutaneously every 7 days.  Qty: 4 mL, Refills: 0    Associated Diagnoses: Rheumatoid arthritis involving both hands with positive rheumatoid factor (H)      ferrous gluconate (FERGON) 324 (38 Fe) MG tablet Take 1 tablet (324 mg) by mouth daily (with breakfast).  Qty: 30 tablet, Refills: 0    Associated Diagnoses: Anemia, unspecified type      folic acid (FOLVITE) 1 MG tablet Take 1 tablet (1 mg) by mouth daily.  Qty: 30 tablet, Refills: 11    Associated Diagnoses: Rheumatoid arthritis involving both hands with positive rheumatoid factor (H)      methotrexate 2.5 MG tablet Take 8 tablets (20 mg) by mouth every 7 days.  Qty: 32 tablet, Refills: 0    Associated Diagnoses: Rheumatoid arthritis involving both hands with positive rheumatoid factor (H)      senna-docusate (SENOKOT-S/PERICOLACE) 8.6-50 MG tablet Take 1 tablet by mouth 2 times daily as needed for constipation.  Qty: 30 tablet, Refills: 0    Associated Diagnoses: Constipation due to opioid therapy           CONTINUE these medications which have CHANGED    Details   acetaminophen (TYLENOL) 500 MG tablet Take 2 tablets (1,000 mg) by mouth every 8 hours as needed for fever or pain.  Qty: 30 tablet, Refills: 0    Associated Diagnoses: Rheumatoid arthritis involving both hands with  positive rheumatoid factor (H)      albuterol (PROAIR HFA/PROVENTIL HFA/VENTOLIN HFA) 108 (90 Base) MCG/ACT inhaler Inhale 2 puffs into the lungs every 4 hours as needed for shortness of breath or wheezing.  Qty: 18 g, Refills: 0    Comments: Pharmacy may dispense brand covered by insurance (Proair, or proventil or ventolin or generic albuterol inhaler)  Associated Diagnoses: Mild intermittent asthma without complication      cephALEXin (KEFLEX) 500 MG capsule Take 1 capsule (500 mg) by mouth 2 times daily for 10 days.  Qty: 20 capsule, Refills: 0    Associated Diagnoses: Paronychia of toe, right      naloxone (NARCAN) 4 MG/0.1ML nasal spray Spray 1 spray (4 mg) into one nostril alternating nostrils as needed for opioid reversal. every 2-3 minutes until assistance arrives  Qty: 0.2 mL, Refills: 11    Associated Diagnoses: Opioid use disorder, severe, dependence (H)      ondansetron (ZOFRAN ODT) 8 MG ODT tab Take 1 tablet (8 mg) by mouth every 8 hours as needed for nausea.  Qty: 20 tablet, Refills: 0    Associated Diagnoses: Opioid use disorder, severe, dependence (H)      polyethylene glycol (MIRALAX) 17 GM/Dose powder Take 17 g (1 Capful) by mouth daily.  Qty: 578 g, Refills: 0    Associated Diagnoses: Constipation due to opioid therapy      predniSONE (DELTASONE) 5 MG tablet Take 4 tablets (20 mg) by mouth daily for 2 days, THEN 3 tablets (15 mg) daily for 7 days.  Qty: 29 tablet, Refills: 0    Associated Diagnoses: Rheumatoid arthritis involving both hands with positive rheumatoid factor (H)           STOP taking these medications       bictegravir-emtricitabine-tenofovir (BIKTARVY) -25 MG per tablet Comments:   Reason for Stopping:         buprenorphine (SUBUTEX) 8 MG SUBL sublingual tablet Comments:   Reason for Stopping:         buprenorphine-naloxone (SUBOXONE) 2-0.5 MG SUBL sublingual tablet Comments:   Reason for Stopping:         buPROPion (WELLBUTRIN XL) 150 MG 24 hr tablet Comments:   Reason for  Stopping:         fluticasone (FLOVENT HFA) 110 MCG/ACT Inhaler Comments:   Reason for Stopping:         levothyroxine (SYNTHROID/LEVOTHROID) 25 MCG tablet Comments:   Reason for Stopping:         METHOTREXATE PO Comments:   Reason for Stopping:         sertraline (ZOLOFT) 25 MG tablet Comments:   Reason for Stopping:             Allergies   No Known Allergies
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
## 2025-02-17 NOTE — PROGRESS NOTES
VS: See flowsheet     O2: >90% on RA. No complaints of SOB or chest pain.   Output: Voiding adequate amounts w/o pain or difficulty.   Last BM: 2/15/25   Activity: Independent    Skin: Open area right little toe   Pain: Managed with prn medication   CMS: A/O x 4   Dressing: none   Diet: Regular, tolerating well. No complaints of nausea or vomiting.   LDA: L PIV   Equipment: Personal belongings   Plan: Continue POV   Additional Info: Continues to be drowsy but responds appropriately to questions.

## 2025-02-17 NOTE — PLAN OF CARE
Goal Outcome Evaluation:   Plan of Care Reviewed With: patient  Overall Patient Progress: no changeOverall Patient Progress: no change  Shift 0213-2454  No acute overnight. Pt is AxOx4, denies sob and cp. Denies n/v, Left toe pain rated 6/10. Wound care provided. No BM during the shift. Not oob. No new medical concern. POC ongoing. Chem Dep consult on 2/17.   Temp: 98  F (36.7  C) Temp src: Oral BP: 96/68 Pulse: 85   Resp: 16 SpO2: 96 % O2 Device: None (Room air)

## 2025-02-17 NOTE — CARE PLAN
Rheumatology will sign off. Please feel free to touch base with any questions.     Discussed with Dr Dewayne Lockhart MD

## 2025-02-17 NOTE — PROGRESS NOTES
Care Management Follow Up    Length of Stay (days): 4    Expected Discharge Date: 02/19/2025     Concerns to be Addressed: discharge planning     Patient plan of care discussed at interdisciplinary rounds: Yes    Anticipated Discharge Disposition: Inpatient Chemical Dependency    Anticipated Discharge Services: Transportation Services, Chemical Dependency Resources  Anticipated Discharge DME: None    Patient/family educated on Medicare website which has current facility and service quality ratings: no  Education Provided on the Discharge Plan: Yes  Patient/Family in Agreement with the Plan: yes    Referrals Placed by CM/SW:  (Referrals to IP CLINT Tx sent by [facility])  Private pay costs discussed: Not applicable    Discussed  Partnership in Safe Discharge Planning  document with patient/family: No     Handoff Completed: No, handoff not indicated or clinically appropriate    Additional Information:    ISSA met with patient today and completed Comprehensive Assessment. Referral was sent to [facility] for residential treatment.    SW met with patient at bedside. Patient signed PARMJIT for [facility]. SW placed in patient's hard chart.    SW spoke with Liliya Portillo with [facility]. Patient has been accepted to [facility]. Can admit tomorrow at 11am. Will need 30 days of medications. They will send an Uber to pick patient up. They are aware that patient will have to go to [facility] (or [facility] of the like) for her weekly Brixadi injections.    SW met with patient at bedside and informed her of the accepting facility. Patient appreciative. She asked for clothes, SW will see what is available.    Provider aware of discharge plan and will begin discharge orders.    Next Steps:     Ensure 30 days of medications are supplied to patient before discharge.          Jennifer uGido, ROXANEW, LSW  8 Med Surg   [facility]  Phone: [phone]  Vocera: Searchable by name or group: 8 Med Surg Vocera

## 2025-02-17 NOTE — TELEPHONE ENCOUNTER
----- Message from Devendra Buchanan sent at 2/14/2025  8:47 PM CST -----  Patient hospitalized for RA.  I request followup in 30 minute slot (MORTEZA/nurse hold) within 1 month at any site with me.  Thank you.

## 2025-02-17 NOTE — CONSULTS
2/17/2025  Pt completed her CLINT CA today. She is interested in attending IP CLINT treatment at [facility]. The PARMJIT for [facility] was emailed to the unit  today for the pt to sign. Pt's CLINT CA was sent to [facility] today.    CLEO  Service Initiation Date ID: [id number]    Recommendations:   1)  Complete an Intensive Outpatient CD Treatment Program.  2)  Abstain from all mood-altering chemicals unless prescribed by a licensed provider.   3)  Attend, at minimum, 2 weekly support group meetings, such as 12 step based (AA/NA), SMART Recovery, Health Realizations, and/or Refuge Recovery meetings.     4)  Actively work with a female mentor/sponsor on a weekly basis.   5)  Follow all the recommendations of your treatment/medical providers.    Clinical Substantiation:  Pt reports wanting to be sober. She reports the longest sobriety has been 2-3 months away from friends. She reports all of her friends use. She is interested in IP CLINT treatment at this time.    Referrals/ Alternatives:  [facility]:    RESIDENTIAL ADMISSIONS  [address] [place] [address]  Phone: [phone]  Fax: [phone]  Email: [email or web address]     CLINT consult completed by: Rhea Patel [facility].  Phone Number: [phone]  E-mail Address: [email or web address] Mental [email or web address] and [email or web address] Evaluation Department  [address] [place] [address]     *Due to regulation of Title 42 of the Code of Federal Regulations (CFR) Part 2: Confidentiality laws apply to this note and the information wherein.  Thus, this note cannot be copy and pasted into any other health care staff's note nor can it be included in general medical records sent to ANY outside agency without the patient's written consent.

## 2025-02-17 NOTE — PROGRESS NOTES
Prior Authorization **INITIATED**    Medication: ENBREL SURECLICK 50 MG/ML SC SOAJ  Insurance Company: [facility] - Phone [phone] Fax [phone]  Pharmacy Filling the Rx: [place], [place] - [address]  Filling Pharmacy Phone: [phone]  Filling Pharmacy Fax: [phone]  Start Date: 2/17/2025  Reference #: CoverMyMeds Key: [id number] - [place] Case ID #: [id number]  Comments:  -      Tiesha Arreola CPhT  Discharge Pharmacy Liaison  [facility]  Pronouns: She/Her/Hers    Securely message with [employer], [employer] Secure Chat, or [email or web address]  Phone: [phone]  Fax: [phone]  [email or web address]

## 2025-02-18 VITALS
TEMPERATURE: 97.2 F | DIASTOLIC BLOOD PRESSURE: 71 MMHG | RESPIRATION RATE: 20 BRPM | BODY MASS INDEX: 21.46 KG/M2 | OXYGEN SATURATION: 99 % | HEART RATE: 67 BPM | SYSTOLIC BLOOD PRESSURE: 97 MMHG | WEIGHT: 145.3 LBS

## 2025-02-18 PROCEDURE — 250N000013 HC RX MED GY IP 250 OP 250 PS 637: Performed by: INTERNAL MEDICINE

## 2025-02-18 PROCEDURE — 250N000013 HC RX MED GY IP 250 OP 250 PS 637

## 2025-02-18 PROCEDURE — 250N000012 HC RX MED GY IP 250 OP 636 PS 637: Performed by: INTERNAL MEDICINE

## 2025-02-18 PROCEDURE — 99232 SBSQ HOSP IP/OBS MODERATE 35: CPT | Performed by: INTERNAL MEDICINE

## 2025-02-18 RX ORDER — PANTOPRAZOLE SODIUM 40 MG/1
40 TABLET, DELAYED RELEASE ORAL DAILY
Qty: 14 TABLET | Refills: 0 | Status: SHIPPED | OUTPATIENT
Start: 2025-02-18

## 2025-02-18 RX ORDER — NICOTINE 21 MG/24HR
1 PATCH, TRANSDERMAL 24 HOURS TRANSDERMAL EVERY 24 HOURS
Qty: 14 PATCH | Refills: 0 | Status: SHIPPED | OUTPATIENT
Start: 2025-02-18

## 2025-02-18 RX ADMIN — FERROUS GLUCONATE 324 MG: 324 TABLET ORAL at 08:30

## 2025-02-18 RX ADMIN — PREDNISONE 20 MG: 20 TABLET ORAL at 08:30

## 2025-02-18 RX ADMIN — CEPHALEXIN 500 MG: 500 CAPSULE ORAL at 08:30

## 2025-02-18 ASSESSMENT — ACTIVITIES OF DAILY LIVING (ADL)
ADLS_ACUITY_SCORE: 32

## 2025-02-18 NOTE — PROGRESS NOTES
[facility]    Medicine Progress Note - Hospitalist Service, GOLD TEAM 21    Date of Admission:  2/13/2025    Assessment & Plan   Juana Madrigal is a 34 year old female admitted on 2/13/2025. She has a past medical history of asthma, rheumatoid arthritis, GERD, allergic rhinitis, depression. ADHD, alcohol use disorder, and opioid use disorder. She presented to the ED with nausea and altered mental status; overall picture concerning for drug induced encephalopathy with likely opioid withdrawal. Was admitted to internal medicine for further assessment and monitoring.      Per chart review, patient was seen 2/12/25 at [facility] ED for multiple physical complaints including nausea, chronic pain, and bilateral foot pain.  She reported 4 days since last fentanyl use and was treated with Subutex, Brixadi, and Keflex for early soft tissue infection of toe.     Plan for today  - Discharge orders and summary done by Dr. Pruett yesterday for today   - Seen as subsequent visit today       # Altered mental status, Concern for drug induced encephalopathy   # Concern for opioid withdrawal   # Polysubstance use disorder (meth, opioid, hx alcohol use)  # Nausea, vomiting   Presented to the ED complaining of abdominal pain and nausea. On exam for ED provider, was found to be slow to answer and drowsy. Abdomen nontender and soft. With history of meth and opioid use, last use of fentanyl 2/08/25 and denies use since- with hx of recent opioid overdoses 1/09/25, 2/05/25 with subsequent ED visits.  Patient stated she was given beans and rice by a man and that she feels it could have been laced with meth but denies ingestion of meth on purpose. UDS with fentanyl and amphetamines. Also presented to ED 2/12/25 with nausea and vomiting; was given brixadi (weekly) and sublingual buprenorphine.  Head CT negative for acute findings, once again shows nonspecific empty sella.  UA  bland, pregnancy test negative.  CBC, LFTs, BMP, procalcitonin, lipase normal on admission.  Influenza A/B, RSV, COVID-19 PCR testing negative.  HIV, hepatitis B antibodies negative.  Afebrile, hemodynamically stable.  Nausea and vomiting have subsequently largely resolved and tolerating oral intake well since 2/14.  Hypersomnolence largely resolved now, fully oriented and appropriate and ambulating.  Neurologically intact. COWS scores 5-3.  Seen by psychiatry.  Chem Dep evaluated.     -She is very much interested in residential CD treatment s  -Psychiatry provided recovery clinic information and recommended that patient go there at the end of next week when her Brixadi injection wears off  -Per psychiatry, if patient develops cravings could consider adding additional 4-8 mg buprenorphine sublingually daily    #Nonspecific empty sella:   Noted on CT head, previous CT head at [facility] 10/20/24 also noted partially empty chan with prominence of optic nerve that may suggest idiopathic intracranial hypertension.  Patient denies any issues with headaches or altered vision.  Neurologically intact on exam aside from being somewhat hypersomnolent.  Seen by ophthalmology 2/14/2025, normal eye exam, no optic nerve head edema, no evidence of increased intracranial pressure on ophthalmic exam.  Morning cortisol normal.  Has minimally elevated TSH with normal free T4.      # Infection of right 5th toe (paronychia)  Presented to ED 2/12/25 and noted painful right fifth toe, with 1 cm ulceration over the dorsum of the toe with scant amount of purulent drainage and some swelling.  Started 500 mg BID for 7 days on ED visit 2/12 but she did not continue taking. WBC, procalcitonin normal.  CRP elevated at 17.19 at this admission.  Afebrile.  X-ray 2/13 showed no acute displaced fracture or subluxation, no discrete erosion.  On current exam, small area on dorsum of right fifth toe, no pain with range of motion of the proximal toe or  "palpation of the foot, focal tenderness improving.  No induration or crepitus.  No active drainage.  - Continue keflex x 7 days   - Bacitracin ointment     # Rheumatoid arthritis   # Bilateral foot pain   # Peripheral neuropathy   On arrival, endorsed \"pins-and-needles\" sensation in hands and feet which has been going on for over a month. Known hx of RA and has not been taking her home medications (methotrexate and prednisone). 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 Previously on humira but this was discontinued given COVID concerns in 2022. Last plan was to continue methotrexate 10 mg, enbrel injection weekly, and prednisone.  Patient was last seen 2/26/24 by Dr. Christina.  B12, A1c normal.  Inpatient rheumatology consult obtained and greatly appreciated.  Pretreatment ID screening initiated, HIV, hepatitis B serologies, hepatitis C antibody negative.  Started prednisone 20 mg daily for 7 days per rheumatology recommendations on 2/14.  -Continue prednisone 20 mg daily for 7 days followed by 15 mg daily for 7 days, started 2/14  -Await Quant TB Gold results  -Per rheumatology recommendations, delaying restart of previously successful combination therapy with methotrexate 8 tablets (20 mg) once weekly, daily folic acid, and etanercept 50 mg subcutaneous once weekly for now with recommendations to initiate these at hospital discharge.  -Will need to limit alcohol intake to 2 drinks weekly, which further drives the importance of CD treatment  -AST/ALT, albumin, CBC with platelets every 3 months  -Follow-up in rheumatology clinic with Dr. Buchanan in 3-6 weeks.  -Place [facility] rheumatology referral at discharge and rheumatology RN care coordinators will contact patient to arrange follow-up visit to an outpatient clinic at [facility] or other [place] rheumatology clinic site.    #Leukopenia  #Anemia, borderline " microcytic:  WBC has historically been consistently normal, including at the time of admission 2/13/2025.  Mild intermittent anemia with borderline microcytosis.  Mild leukopenia, anemia 2/14, likely dilutional component following IV fluids.  No bleeding clinically.  Afebrile.  Hemoglobin improved 11.2, WBC 4.5 on 2/15.  Ferritin 39, decreased iron stores but decreased TIBC c/w ACD +/- ISAIAH.   -Started iron gluconate daily     # Asthma   Denies cough or dyspnea.  Afebrile, oxygenating well on room air.  Lungs CTA.  - PRN albuterol ordered      # Allergic rhinitis   - No longer taking flonase      # Depression  # ADHD   - No longer taking wellbutrin or sertraline      # GERD   - Ordered Protonix daily per pt request     # Minimally elevated TSH  Minimally elevated TSH, normal free T4 on 2/12/25.     # Experiencing housing insecurity  - SW consulted         Diet: Regular Diet Adult  Diet    DVT Prophylaxis: Pneumatic Compression Devices  Ruvalcaba Catheter: Not present  Lines: None     Cardiac Monitoring: None  Code Status: Full Code                 Disposition Plan     Medically Ready for Discharge: Ready Now             Bandar Sandoval MD  Hospitalist Service, GOLD TEAM 21  [facility]  Securely message with [employer] (more info)  Text page via [employer] Paging/Directory   See signed in provider for up to date coverage information  ______________________________________________________________________    Interval History   No acute events, awaiting ride to  treatment center     Physical Exam   Vital Signs: Temp: 97.2  F (36.2  C) Temp src: Oral BP: 97/71 Pulse: 67   Resp: 20 SpO2: 99 % O2 Device: None (Room air)    Weight: 145 lbs 4.8 oz  Constitutional: Awake, cooperative, no apparent distress  Respiratory: Clear to auscultation bilaterally  Cardiovascular: Regular rate and rhythm  GI: Normal bowel sounds, soft, non-distended, non-tender      Data   Imaging results reviewed over  the past 24 hrs:   No results found for this or any previous visit (from the past 24 hours).    Recent Labs   Lab 02/15/25  0822 02/14/25  0635 02/13/25  0948 02/12/25  2338   WBC 4.5 3.3* 5.1 5.4   HGB 11.2* 10.2* 12.2 11.7   MCV 81 81 82 79    266 290 292    142 143 138   POTASSIUM 3.6 3.5 3.6 3.6   CHLORIDE 103 106 106 99   CO2 29 28 28 27   BUN 4.5* 4.8* 9.4 12.1   CR 0.49* 0.55 0.55 0.54   ANIONGAP 10 8 9 12   CLARITA 9.0 8.4* 8.8 9.1   GLC 90 79 110* 109*   ALBUMIN  --  3.3* 3.9 3.9   PROTTOTAL  --  5.9* 7.1 7.2   BILITOTAL  --  0.4 0.3 0.2   ALKPHOS  --  62 78 96   ALT  --  9 11 11   AST  --  15 14 18   LIPASE  --   --  8* 18

## 2025-02-18 NOTE — PLAN OF CARE
Pt remains A/O x4 but continues to be lethargic. Pt is vitally stable. Pt continues to deny SOB, chest pain. Pt currently denies N/V. Pt denies pain this shift. IV dressing replaced this shift. R foot wound care done this shift. Pt uses call light appropriately and able to make needs known. No acute changes overnight.     Goal Outcome Evaluation:    Plan of Care Reviewed With: patient    Overall Patient Progress: no change

## 2025-02-18 NOTE — PROGRESS NOTES
Prior Authorization **APPROVED**    Authorization Effective Date: 2/18/2025  Authorization Expiration Date: 2/18/2026  Medication: ENBREL SURECLICK 50 MG/ML SC SOAJ  Approved Dose/Quantity: -  Reference #: CoverMyMeds Key: [id number] - [place] Case ID #: [id number]   Insurance Company: [employer] - Phone [phone] Fax [phone]  Expected CoPay: $ 4.64   CoPay Card Available: No    Foundation Assistance Needed: -  Which Pharmacy is filling the prescription (Not needed for infusion/clinic administered): [facility] [place], [place] - [address]  Pharmacy Notified: yes  Patient Notified: yes  Comments:  -          Tiesha Arreola [facility]  Discharge Pharmacy Liaison  [facility] Discharge Pharmacy  Pronouns: She/Her/Hers    Securely message with [employer], [employer] Secure Chat, or [employer]  Phone: [phone]  Fax: [phone]  [email or web address]

## 2025-02-18 NOTE — CONSULTS
Care Management Discharge Note    Discharge Date: 02/18/2025       Discharge Disposition: Inpatient Chemical Dependency    [facility]    [facility]IAL ADMISSIONS  [address] [place] [address]  Phone: [phone]  Fax: [phone]  Email: [email or web address]    Discharge Services: Transportation Services, Chemical Dependency Resources    Discharge DME: None    Discharge Transportation: [facility] is sending an [facility]    Private pay costs discussed: Not applicable    Does the patient's insurance plan have a 3 day qualifying hospital stay waiver?  Yes     Which insurance plan 3 day waiver is available? Alternative insurance waiver    Will the waiver be used for post-acute placement? No    PAS Confirmation Code:  N/A  Patient/family educated on Medicare website which has current facility and service quality ratings: no    Education Provided on the Discharge Plan: Yes  Persons Notified of Discharge Plans: bedside nurse, charge nurse, patient, provider, facility  Patient/Family in Agreement with the Plan: yes    Handoff Referral Completed: No, handoff not indicated or clinically appropriate    Additional Information:    Auto consult placed for Elevated Unplanned Readmission Risk Score. Care management already involved with patient.    Provided patient with pair of pants, sweater, and shoes.     Patient and bedside nurse aware that patient should be in lobby a bit before 11am so that she can catch her ride.    Confirmed with bedside RN that patient has gotten in the Uber to head to treatment facility.        Jennifer Guido, ROXANEW, LSW  8 Med Surg   [facility]  Phone: [phone]  Vocera: Searchable by name or group: 8 Med Surg Vocera

## 2025-03-10 ENCOUNTER — TRANSFERRED RECORDS (OUTPATIENT)
Dept: HEALTH INFORMATION MANAGEMENT | Facility: CLINIC | Age: 35
End: 2025-03-10

## 2025-03-20 ENCOUNTER — DOCUMENTATION ONLY (OUTPATIENT)
Dept: RHEUMATOLOGY | Facility: CLINIC | Age: 35
End: 2025-03-20

## 2025-04-11 ENCOUNTER — TRANSFERRED RECORDS (OUTPATIENT)
Dept: HEALTH INFORMATION MANAGEMENT | Facility: CLINIC | Age: 35
End: 2025-04-11

## 2025-04-25 ENCOUNTER — OFFICE VISIT (OUTPATIENT)
Dept: BEHAVIORAL HEALTH | Facility: CLINIC | Age: 35
End: 2025-04-25
Payer: COMMERCIAL

## 2025-04-25 ENCOUNTER — TELEPHONE (OUTPATIENT)
Dept: BEHAVIORAL HEALTH | Facility: CLINIC | Age: 35
End: 2025-04-25

## 2025-04-25 DIAGNOSIS — F11.20 OPIOID USE DISORDER, SEVERE, DEPENDENCE (H): Primary | ICD-10-CM

## 2025-04-25 PROCEDURE — 99207 PR NO CHARGE LOS: CPT

## 2025-04-25 NOTE — TELEPHONE ENCOUNTER
Please contact pt week of 4/28/25 to assist as needed in scheduling Sublocade and follow-up in  on same date.     She received first 300mg Sublocade @ [facility] on 4/10/25.  She should get her next injection before 5/10/25.          - I have reviewed recommendations for comprehensive treatment plan with the patient  - I have reviewed the patient's medications comprehensively  and provided education to the patient on risks associated with concurrent use of benzodiazepines, alcohol, other sedatives with opioids  - I have recommended concomitant psychosocial support  - I have complied with all aspects of REMS program for Sublocade. [facility] where Sublocade will be administered is in compliance with all aspects of REMS program.   - Patient meets DSM-5 criteria for moderate or severe opioid use disorder  - Patient has been prescribed buprenorphine 8-24mg/day for at least one 1 week, demonstrating control of cravings and withdrawal symptoms as well as tolerance of buprenorphine.   - Patient will discontinue sublingual buprenorphine upon initiation of Sublocade  - Patient does not have evidence of tampering or attempts to remove the depot at the injection site.   - Patient does not have severe hepatic impairment.   - Patient does not have adrenal insufficiency.   - Patient does not have a history of long QT syndrome  - Patient does not take any antiarrhythmic medications or other medications known to significantly prolong QT interval.  She is prescribed fluoxetine which has low rate of affecting QT interval.  Risk mitigation strategies include periodic ECG.    - Urine Drug Screen on 4/25/25 was positive for buprenorphine  - Patient will not be receiving methadone while on Sublocade  - Patient will not be receiving any other long acting buprenorphine products or long acting naltrexone while on Sublocade

## 2025-04-28 NOTE — TELEPHONE ENCOUNTER
"Attempted to call patient at mobile # listed. Name on voicemail was \"Zaira YANEZ\" and voicemail full. No Mychart.    Alba Seth RN on 4/28/2025 at 11:17 AM    "

## 2025-04-28 NOTE — PROGRESS NOTES
[facility] Individual Session Summary     Session Start Time: 4:01 pm     Session End Time: 4:18 pm     Duration: 17 minutes      DATA: Peer  met with Juana Madrigal in the Recovery Clinic to introduce and to provide patient further support and resources for their recovery.  Engaged in a discussion regarding where pt is at in terms of their recovery. Pt stated their DOC is fentanyl.  Pt states they are maintaining sobriety and need help with housing.  DIANA Graham retrieved Housing Resources and went over them with Pt to provide some direction.      Intervention: Facilitated an individual session, utilized active listening, provided validation, utilized motivational interviewing techniques, provided shared experience.    Assessment: Patient appeared calm and communicative     Plan: Peer  will continue to provide support as needed. Provided patient with business card to pt encouraging pt to reach out to peer  if needed additional support and resources.   Patient Identified Goals  To continue to utilize their suboxone as prescribed., To continue to take my other medications as prescribed., and To establish stable housing.    Support Needs:   Ongoing care, support and resources for opioid substance use disorder as well as other substances.       Key Risk Factors to Recovery:   PRC encourages being aware of risk factors that can lead to re-use which include avoiding isolation, avoiding triggers and managing cravings in a healthy manner. being open and willing to acceptance and change on a daily basis.  PRC encourages pt to establish a sober network calling tree to reach out to when needed.  Continue to practice honesty with ourselves and trusted support person(s).   PRC encourages regular attendance at recovery based meetings as well as finding a sponsor for mentoring and accountability.   PRC encourages consideration of other services such as  counseling for mental health issues which can correlate with our substance use.      Alin Graham  April 28, 2025  4:32 PM    Attestation: Guillermina Crenshaw, LPCC, LADC Provides oversight and supervision of care.

## 2025-04-28 NOTE — TELEPHONE ENCOUNTER
Patient is scheduled with [facility] for Sublocade #2 on 05/09/2025 @3:30 pm and follow-up in the Recovery Clinic prior to Sublocade @3:00 pm on 05/09/2025.     Lisa Chavarria RN on 4/28/2025 at 2:58 PM

## 2025-05-07 ENCOUNTER — OFFICE VISIT (OUTPATIENT)
Dept: FAMILY MEDICINE | Facility: CLINIC | Age: 35
End: 2025-05-07
Payer: COMMERCIAL

## 2025-05-07 VITALS
WEIGHT: 159.2 LBS | TEMPERATURE: 97.2 F | HEART RATE: 70 BPM | BODY MASS INDEX: 22.29 KG/M2 | OXYGEN SATURATION: 100 % | SYSTOLIC BLOOD PRESSURE: 100 MMHG | RESPIRATION RATE: 16 BRPM | HEIGHT: 71 IN | DIASTOLIC BLOOD PRESSURE: 68 MMHG

## 2025-05-07 DIAGNOSIS — R87.610 ATYPICAL SQUAMOUS CELLS OF UNDETERMINED SIGNIFICANCE ON CYTOLOGIC SMEAR OF CERVIX (ASC-US): ICD-10-CM

## 2025-05-07 DIAGNOSIS — F19.90 POLYSUBSTANCE USE DISORDER: ICD-10-CM

## 2025-05-07 DIAGNOSIS — Z76.89 ESTABLISHING CARE WITH NEW DOCTOR, ENCOUNTER FOR: Primary | ICD-10-CM

## 2025-05-07 DIAGNOSIS — Z13.9 ENCOUNTER FOR SCREENING INVOLVING SOCIAL DETERMINANTS OF HEALTH (SDOH): ICD-10-CM

## 2025-05-07 DIAGNOSIS — F43.10 PTSD (POST-TRAUMATIC STRESS DISORDER): ICD-10-CM

## 2025-05-07 DIAGNOSIS — F41.1 GAD (GENERALIZED ANXIETY DISORDER): ICD-10-CM

## 2025-05-07 DIAGNOSIS — Z11.3 SCREENING EXAMINATION FOR STI: ICD-10-CM

## 2025-05-07 DIAGNOSIS — E61.1 IRON DEFICIENCY: ICD-10-CM

## 2025-05-07 DIAGNOSIS — F17.200 NICOTINE DEPENDENCE, UNCOMPLICATED, UNSPECIFIED NICOTINE PRODUCT TYPE: ICD-10-CM

## 2025-05-07 PROBLEM — F32.A DEPRESSION: Status: ACTIVE | Noted: 2025-05-07

## 2025-05-07 PROCEDURE — 3074F SYST BP LT 130 MM HG: CPT

## 2025-05-07 PROCEDURE — 87491 CHLMYD TRACH DNA AMP PROBE: CPT

## 2025-05-07 PROCEDURE — 99214 OFFICE O/P EST MOD 30 MIN: CPT | Mod: GC

## 2025-05-07 PROCEDURE — 86780 TREPONEMA PALLIDUM: CPT

## 2025-05-07 PROCEDURE — 3078F DIAST BP <80 MM HG: CPT

## 2025-05-07 PROCEDURE — 36415 COLL VENOUS BLD VENIPUNCTURE: CPT

## 2025-05-07 PROCEDURE — 87591 N.GONORRHOEAE DNA AMP PROB: CPT

## 2025-05-07 RX ORDER — FERROUS SULFATE 325(65) MG
325 TABLET ORAL EVERY OTHER DAY
Qty: 90 TABLET | Refills: 3 | Status: SHIPPED | OUTPATIENT
Start: 2025-05-07

## 2025-05-07 ASSESSMENT — PATIENT HEALTH QUESTIONNAIRE - PHQ9
SUM OF ALL RESPONSES TO PHQ QUESTIONS 1-9: 15
10. IF YOU CHECKED OFF ANY PROBLEMS, HOW DIFFICULT HAVE THESE PROBLEMS MADE IT FOR YOU TO DO YOUR WORK, TAKE CARE OF THINGS AT HOME, OR GET ALONG WITH OTHER PEOPLE: VERY DIFFICULT
SUM OF ALL RESPONSES TO PHQ QUESTIONS 1-9: 15

## 2025-05-07 ASSESSMENT — ANXIETY QUESTIONNAIRES
4. TROUBLE RELAXING: NEARLY EVERY DAY
IF YOU CHECKED OFF ANY PROBLEMS ON THIS QUESTIONNAIRE, HOW DIFFICULT HAVE THESE PROBLEMS MADE IT FOR YOU TO DO YOUR WORK, TAKE CARE OF THINGS AT HOME, OR GET ALONG WITH OTHER PEOPLE: VERY DIFFICULT
7. FEELING AFRAID AS IF SOMETHING AWFUL MIGHT HAPPEN: SEVERAL DAYS
1. FEELING NERVOUS, ANXIOUS, OR ON EDGE: SEVERAL DAYS
6. BECOMING EASILY ANNOYED OR IRRITABLE: NEARLY EVERY DAY
2. NOT BEING ABLE TO STOP OR CONTROL WORRYING: SEVERAL DAYS
3. WORRYING TOO MUCH ABOUT DIFFERENT THINGS: NEARLY EVERY DAY
GAD7 TOTAL SCORE: 15
GAD7 TOTAL SCORE: 15
5. BEING SO RESTLESS THAT IT IS HARD TO SIT STILL: NEARLY EVERY DAY

## 2025-05-07 ASSESSMENT — ASTHMA QUESTIONNAIRES
QUESTION_3 LAST FOUR WEEKS HOW OFTEN DID YOUR ASTHMA SYMPTOMS (WHEEZING, COUGHING, SHORTNESS OF BREATH, CHEST TIGHTNESS OR PAIN) WAKE YOU UP AT NIGHT OR EARLIER THAN USUAL IN THE MORNING: ONCE A WEEK
QUESTION_5 LAST FOUR WEEKS HOW WOULD YOU RATE YOUR ASTHMA CONTROL: SOMEWHAT CONTROLLED
QUESTION_1 LAST FOUR WEEKS HOW MUCH OF THE TIME DID YOUR ASTHMA KEEP YOU FROM GETTING AS MUCH DONE AT WORK, SCHOOL OR AT HOME: SOME OF THE TIME
ACT_TOTALSCORE: 13
QUESTION_4 LAST FOUR WEEKS HOW OFTEN HAVE YOU USED YOUR RESCUE INHALER OR NEBULIZER MEDICATION (SUCH AS ALBUTEROL): ONE OR TWO TIMES PER DAY
QUESTION_2 LAST FOUR WEEKS HOW OFTEN HAVE YOU HAD SHORTNESS OF BREATH: ONCE A DAY

## 2025-05-07 NOTE — PATIENT INSTRUCTIONS
CRISIS NUMBERS:                 SmartList   National Suicide Prevention Lifeline: 9-207-636-TALK (807-586-2727)  Qinec/resources for a list of additional resources (SOS)            [facility] - [phone]   Urgent Care Adult Mental Pzmpls-[phone] mobile unit/ 24/7 crisis line  [facility] -[phone]   COPE 24/7 [place] Mobile Team -[phone] (adults)/ [phone] (child)  Poison Control Center - [phone]    OR  go to nearest ER  Crisis Text Line for any crisis 24/7 send this-   To: [phone]   [facility]  [phone]    National Suicide Prevention Lifeline: 9-104-193-TALK (160-038-5136)  Qinec/resources for a list of additional resources (SOS)            [facility] - [phone]   [facility] Mental Health-[phone] (adult) [phone] (child)     - mobile unit/ 24/7 crisis line  [facility] , Acute Psychiatric Service (APS)-[phone]   COPE 24/7 [place] Mobile Team -[phone] (adults)/ [phone] (child)  Poison Control Center - [phone]    OR  go to nearest ER  Crisis Text Line for any crisis 24/7 send this-   To: [phone]   [facility]  [phone]    Trans Lifeline a hotline for transgender people 1-402.480.7809  The Seferino Project a hotline for LGBT youth 1-831.459.9062  CHILD: [facility] has a needs assessment team [phone]  [facility]     [phone]  Crisis Residence [facility]   [phone]  Walk-in counseling [facility]       [phone]  Walk-in counseling [facility]            [phone]  Crisis Residence [facility] - [phone]  Walk-In Counseling Center  [facility]     [phone]  [facility] Pts: Clinic [phone],  [phone] (after hours)   See additional, specific crisis numbers page 4                  Links to mental health resources in [place] are provided below.     Mental Health Clinics.org.  Mental Health Facilities in [place]  https://www.mentalhealthclinics.org/clinics/[place]/?utm_source=helen&utm_medium=[facility]&utm_campaign=Mental%20Health%20Clinics&utm_term=mental%20health%20services%20in%20[place]&utm_content=Mental%20Clinics%20-%20[place]     Crisis Stabilization   https://www.[facility].org/services/crisis-services/crisis-stabilization/#:~:text=Emergency%2FCrisis%20Contacts%3A%201%201.152.907.8073%3A%20Toll-Free%20Call%20connecting%20you,Care%20in%20any%20area%20in%20the%20[place]%20[place]                                            National Suicide Hotlines  National Suicide Prevention Lifeline: 3-565-816-TALK (215-875-4309)  - 2-314-ZADSQGK (038-851-0580 may go to 1-800-TALK).    BugBuster/resources for a list of additional resources (SOS)  Trans Degreedline a hotline for transgender people 1-658.503.9531  The Seferino Project a hotline for LGBT youth 3-544-287-4525  Crisis Text Line: For any crisis 24/7   To: 914323  see www.crisistextline.org  - IF MAKING A CALL FEELS TOO HARD, send a text!    CRISIS #s- the List  National Suicide Prevention Lifeline: 9-050-214-TALK (346-178-0569)  BugBuster/resources for a list of additional resources (SOS)            [facility] - [phone]   Urgent Care Adult Mental Zmnzam-[phone] mobile unit/ 24/7 crisis line  [facility] -[phone]   COPE 24/7 [place] Mobile Team -[phone] (adults)/ [phone] (child)  Poison Control Center - [phone]    OR  go to nearest ER  Crisis Text Line for any crisis 24/7 send this-   To: 337624   [facility] ER  [phone]  -- crisis number drop down    [facility] After Hours  [phone]     Poison Control Center - [phone]    CRISIS INFORMATION- SUICIDE (and other crisis situations)  If a person says they are  considering suicide...  - Take the person seriously  - Stay with them  - Help them remove lethal means  - Escort them to mental health services or an emergency room    CRISIS INTERVENTION TEAM (CIT) -   This is a POLICE UNIT, specially trained.   This website has information for all of [place]'s CITs. Lays out which areas have this team     [email or web address]  OR  just call 911 and ask for this special team.   If police need to be called, DO ask for this team.  ALSO [facility] has a team called 'ride along' which places a COPE worker with a police unit.    CRISIS MOBILE TEAMS  [and see end of this phrase*]   24/7  [facility] -  -COPE: [phone]  (Adults > 17yo)  [phone]      (Child   < 18yo)  -FRONT DOOR (during the day could call)   [phone]    [facility] -[phone] (Adult) -[phone] (Child)     [facility] -[phone] (Adult/ Child)     [facility] -[phone] ([facility] mobile [facility]; Adult/ Child)     [facility] -[phone] (Adult/ Child)     [place], [place], [place], [facility]   ([place] Crisis Response Team)    [phone] or [phone]    CRISIS HOUSING  [facility]   [address]              [phone]  [facility]        [address]                       [phone]  [facility]       [address]     [phone]   [facility] Crisis Residence                          [phone]    [facility]  Crisis Connection:                                [phone]  [facility]:       [phone]  National Suicide/ Crisis Intervention:   763.906.5078 or 207-852-0167   COPE: see Mobile Team above     WALK-IN COUNSELING:  Walk-In Counseling Center       [phone]       [address] [place] [address]  BRYCE  - EVER, W, F:  1:00-3:00PM    M-Th:   "6:30-8:30PM    TRANS and LGBT  Trans Lifeline-  [phone],  staffed by trans people .    Seferino Smartjogline-  5-481-9791, LGBT youth <24 contemplating suicide    POISON CONTROL CENTER  [phone]  OR  911  OR  nearest ER    CHILD  \"[facility] has a needs assessment team who will do an intake evaluation. Based on the results of the intake they will recommended inpatient admission, partial hospitalization, intensive outpatient or outpatient care. The number is [phone]. \"    [facility] EMERGENCY NUMBERS  Crisis Connection:                                [phone]  [facility]:              [phone]  Crisis Intervention:                                [phone] or [phone]   COPE: Mobile Team 24hrs/7days:    [phone]  (Adults > 17yo)                                                                            [phone]  (Child   < 18yo)  Urgent Care for Adult Mental Health        [phone]  CALL 24 hours a day.  [address] [place] [address]  DROP IN:  M-F: 8:00 am - 7:00 pm  Sat: 11:00 am - 3:00 pm   Sun: Closed    WALK-IN COUNSELIN)  [facility]                                  [phone]        [address] [place]:                  EVER, W:      5:00-7:00PM       2)  [facility]                            [phone]        [place], [address] [facility]                T, Th:      6:00-8:00PM    TRANS and LGBT  Call [facility] [employer] at [phone]. This service is staffed by trans people .   LGBT youth <24 contemplating suicide, call the [employer] [phone].    POISON CONTROL CENTER  [phone]    OR  go to nearest ER    CHILD  \"[facility] has a needs assessment team who will do an intake evaluation. Based on the results of the intake they will recommended inpatient admission, partial hospitalization, intensive outpatient or outpatient care. The number is [phone] or [phone]. \"    CRISIS TEXT " LINE  Http://www.crisistextline.org  FREE SUPPORT AT YOUR FINGERTIPS, 24/7  Crisis Text Line serves anyone, in any type of crisis, providing access to free, 24/7 support and information via the medium people already use and trust: text. Here s how it works:  1)  Text [phone] from anywhere in the [place], anytime, about any type of crisis.  2)  A live, trained Crisis Counselor receives the text and responds quickly.      The volunteer Crisis Counselor will help you move from a 'hot moment to a cool moment'            * A Community Paramedic (CP) is an advanced paramedic that works to increase access to primary and preventive care and decrease use of emergency departments, which in turn decreases health care costs. Among other things, CPs may play a key role in providing follow-up services after a hospital discharge to prevent hospital readmission. CPs can provide health assessments, chronic disease monitoring and education, medication management, immunizations and vaccinations, laboratory specimen collection, hospital discharge follow-up care and minor medical procedures. CPs work under the direction of an Ambulance Medical Director.       [address] [address]      24 Hr Phone: [phone]      Site: www.[email or web address]: online travellers club     [address] [address]      24 Hour Phone: [phone]      Fax: [phone]      Web: [email or web address]     15 days from last visit for filing 72hr hold  Should bring in involuntary Patient Education   Here is the plan from today's visit    1. Encounter for screening involving social determinants of health (SDoH) (Primary)    2. PTSD (post-traumatic stress disorder)  Stop taking prozac, start zoloft.  - sertraline (ZOLOFT) 50 MG tablet; Take 1 tablet (50 mg) by mouth daily.  Dispense: 90 tablet; Refill:  3    3. RA (generalized anxiety disorder)  - sertraline (ZOLOFT) 50 MG tablet; Take 1 tablet (50 mg) by mouth daily.  Dispense: 90 tablet; Refill: 3    4. Nicotine dependence, uncomplicated, unspecified nicotine product type  - [place] Quit Partner Ref[place]; Future  - sertraline (ZOLOFT) 50 MG tablet; Take 1 tablet (50 mg) by mouth daily.  Dispense: 90 tablet; Refill: 3    5. Iron deficiency  - ferrous sulfate (FEROSUL) 325 (65 Fe) MG tablet; Take 1 tablet (325 mg) by mouth every other day.  Dispense: 90 tablet; Refill: 3    6. Screening examination for STI  - Treponema Abs w Reflex to RPR and Titer; Future  - NEISSERIA GONORRHOEA PCR  - CHLAMYDIA TRACHOMATIS PCR    7. Atypical squamous cells of undetermined significance on cytologic smear of cervix (ASC-US)  - Colposcopy/Gynecology Clinic - [facility] Internal Referral; Future      QUITPLAN  Services. No judgments. Just help.  Our clinic recommends Quit Plan Services -it Free and Effective    QUITPLAN Services exists for one simple reason. To do everything we can to help you conquer your addiction and become 100 percent tobacco-free. Not with lectures, but with genuine support. Check out all the FREE tools and services we offer, then pick the ones that are right for you. And even if you re not ready right now, we ll be here when you are.    The program combines counseling, medication and community to help any [place] tobacco user quit.     Getting started is easy -   [place] can simply visit [employer] or call 5-352-513-PLAN (6252) (available 24/7)  Para ban [place]: 2-964-KXOJPF-LU ([phone])    TTY: [phone]    QUITPLAN  Helpline  A complete program. You will receive one-on-one phone coaching sessions with trained tobacco counselors and additional tools to help you quit.    Starter Kit  Patches, Gum or Lozenges  Receive two weeks of free patches, gum or lozenges to help you get started.    Email Program  Receive a series of emails full of  tips, advice and encouragement to help you quit.    Quit Guide  A practical and resourceful printable Quit Guide to help you build your plan to quit.     Text Messaging  Receive tips, games and reminders. The Snha2Vsay? program is full of practical advice and encouragement that can help you quit.       Please call or return to clinic if your symptoms don't go away.    Follow up plan  Return in about 2 months (around 7/7/2025).    Thank you for coming to [facility] today.  Lab Testing:  **If you had lab testing today and your results are reassuring or normal they will be mailed to you or sent through [employer] within 7 days.   **If the lab tests need quick action we will call you with the results.  **If you are having labs done on a different day, please call [phone] to schedule at [facility] or [phone] for other [facility] Outpatient Lab locations. Labs do not offer walk-in appointments.  The phone number we will call with results is # [phone] (home) . If this is not the best number please call our clinic and change the number.  Medication Refills:  If you need any refills please call your pharmacy and they will contact us.   If you need to  your refill at a new pharmacy, please contact the new pharmacy directly. The new pharmacy will help you get your medications transferred faster.   Scheduling:  If you have any concerns about today's visit or wish to schedule another appointment please call our office during normal business hours [phone] (8-5:00 M-F). If you can no longer make a scheduled visit, please cancel via [employer] or call us to cancel.   If a referral was made to an [facility] specialty provider and you do not get a call from central scheduling, please refer to directions on your visit summary or call our office during normal business hours for assistance.   If a Mammogram was ordered for you at the Breast Center call [phone] to schedule or change your  appointment.  If you had an XRay/CT/Ultrasound/MRI ordered the number is [phone] to schedule or change your radiology appointment.   [facility] has limited ultrasound appointments available on Wednesdays, if you would like your ultrasound at [facility], please call [phone] to schedule.   Medical Concerns:  If you have urgent medical concerns please call [phone] at any time of the day.    Kiera Mixon MD
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
## 2025-05-08 LAB — T PALLIDUM AB SER QL: NONREACTIVE

## 2025-05-09 LAB
C TRACH DNA SPEC QL NAA+PROBE: NEGATIVE
N GONORRHOEA DNA SPEC QL NAA+PROBE: NEGATIVE
SPECIMEN TYPE: NORMAL
SPECIMEN TYPE: NORMAL

## 2025-05-15 ENCOUNTER — RESULTS FOLLOW-UP (OUTPATIENT)
Dept: FAMILY MEDICINE | Facility: CLINIC | Age: 35
End: 2025-05-15
Payer: COMMERCIAL

## 2025-05-19 ENCOUNTER — OFFICE VISIT (OUTPATIENT)
Dept: FAMILY MEDICINE | Facility: CLINIC | Age: 35
End: 2025-05-19
Payer: COMMERCIAL

## 2025-05-19 VITALS
SYSTOLIC BLOOD PRESSURE: 99 MMHG | HEART RATE: 76 BPM | DIASTOLIC BLOOD PRESSURE: 67 MMHG | WEIGHT: 157 LBS | BODY MASS INDEX: 21.98 KG/M2 | OXYGEN SATURATION: 97 % | HEIGHT: 71 IN | TEMPERATURE: 98.3 F | RESPIRATION RATE: 16 BRPM

## 2025-05-19 DIAGNOSIS — R35.0 URINARY FREQUENCY: ICD-10-CM

## 2025-05-19 DIAGNOSIS — E01.0 THYROMEGALY: ICD-10-CM

## 2025-05-19 DIAGNOSIS — Z71.85 VACCINE COUNSELING: ICD-10-CM

## 2025-05-19 DIAGNOSIS — F11.11 OPIOID USE DISORDER, MILD, IN EARLY REMISSION (H): Primary | ICD-10-CM

## 2025-05-19 DIAGNOSIS — R94.6 THYROID FUNCTION TEST ABNORMAL: ICD-10-CM

## 2025-05-19 LAB
RBC #/AREA URNS AUTO: NORMAL /HPF
WBC #/AREA URNS AUTO: NORMAL /HPF

## 2025-05-19 PROCEDURE — 81015 MICROSCOPIC EXAM OF URINE: CPT

## 2025-05-19 PROCEDURE — 84443 ASSAY THYROID STIM HORMONE: CPT

## 2025-05-19 PROCEDURE — 3074F SYST BP LT 130 MM HG: CPT

## 2025-05-19 PROCEDURE — 99214 OFFICE O/P EST MOD 30 MIN: CPT | Mod: GC

## 2025-05-19 PROCEDURE — 36415 COLL VENOUS BLD VENIPUNCTURE: CPT

## 2025-05-19 PROCEDURE — 3078F DIAST BP <80 MM HG: CPT

## 2025-05-19 PROCEDURE — 84439 ASSAY OF FREE THYROXINE: CPT

## 2025-05-19 NOTE — PATIENT INSTRUCTIONS
Patient Education   Here is the plan from today's visit    1. Thyroid function test abnormal (Primary)  - TSH with free T4 reflex; Future    2. Urinary frequency  - Urine Microscopic Exam; Future      Please call or return to clinic if your symptoms don't go away.    Follow up plan  Return in about 4 weeks (around 6/16/2025).    Thank you [facility] to [facility].  Lab Testing:  **If you had lab testing today and your results are reassuring or normal they will be mailed to you or sent through [employer] within 7 days.   **If the lab tests need quick action we will call you with the results.  **If you are having labs done on a different day, please call [phone] to schedule at [facility] or [phone] for other [facility] Outpatient Lab locations. Labs do not offer walk-in appointments.  The phone number we will call with results is # [phone] (home) . If this is not the best number please call our clinic and change the number.  Medication Refills:  If you need any refills please call your pharmacy and they will contact us.   If you need to  your refill at a new pharmacy, please contact the new pharmacy directly. The new pharmacy will help you get your medications transferred faster.   Scheduling:  If you have any concerns about today's visit or wish to schedule another appointment please call our office during normal business hours [phone] (8-5:00 M-F). If you can no longer make a scheduled visit, please cancel via [employer] or call us to cancel.   If a referral was made to an [facility] specialty provider and you do not get a call from central scheduling, please refer to directions on your visit summary or call our office during normal business hours for assistance.   If a Mammogram was ordered for you at the Breast Center call [phone] to schedule or change your appointment.  If you had an XRay/CT/Ultrasound/MRI ordered the number is [phone] to schedule or change  your radiology appointment.   [facility] has limited ultrasound appointments available on Wednesdays, if you would like your ultrasound at [facility], please call [phone] to schedule.   Medical Concerns:  If you have urgent medical concerns please call [phone] at any time of the day.    Kiera Mixon MD

## 2025-05-19 NOTE — PROGRESS NOTES
"  {PROVIDER CHARTING PREFERENCE:725689}    Subjective   Juana is a 35 year old, presenting for the following health issues:  OTHER (Follow up)        5/19/2025     2:58 PM   Additional Questions   Roomed by Pa   Accompanied by Self         5/19/2025     2:58 PM   Patient Reported Additional Medications   Patient reports taking the following new medications n/a         5/19/2025    Information    services provided? No     HPI      Zoloft  - not able to get until June due to insurance coverage     Smoking cessation  - would like medication to help with quitting     Iron supplement  - will  in June or if she gets cash sooner     Feels like urinating frequency, no dysuria, maybe some constipation.    Very tired. Not sure why. Not using any substances other than the sublocade. Does not drink.    Does not want to be on any opioids, including sublocade. Maybe would do suboxone for a bit.        Objective    BP 99/67   Pulse 76   Temp 98.3  F (36.8  C) (Oral)   Resp 16   Ht 1.797 m (5' 10.75\")   Wt 71.2 kg (157 lb)   LMP 03/28/2025 (Approximate)   SpO2 97%   BMI 22.05 kg/m    Body mass index is 22.05 kg/m .  Physical Exam   {Exam List (Optional):125183}    {Diagnostic Test Results (Optional):840287}        Signed Electronically by: Kiera Mixon MD  {Email feedback regarding this note to [email or web address]   :343137}  " "any substances other than the sublocade. Does not drink.    Does not want to be on any opioids, including sublocade. Maybe would do suboxone for a bit. Has not been using any opioids.        Objective    BP 99/67   Pulse 76   Temp 98.3  F (36.8  C) (Oral)   Resp 16   Ht 1.797 m (5' 10.75\")   Wt 71.2 kg (157 lb)   LMP 03/28/2025 (Approximate)   SpO2 97%   BMI 22.05 kg/m    Body mass index is 22.05 kg/m .  Physical Exam   Constitutional: somnulent, falling asleep during conversation, slowed speech  Eyes: Lids and lashes normal, pupils equal, round and reactive to light, extra ocular muscles intact, sclera clear, conjunctiva normal  ENT: normocepalic, without obvious abnormality. Diffuse thyroidomegaly without nodules palpated  Hematologic / Lymphatic: no cervical lymphadenopathy and no supraclavicular lymphadenopathy  Respiratory: No increased work of breathing, good air exchange, clear to auscultation bilaterally, no crackles or wheezing  Cardiovascular: Normal apical impulse, regular rate and rhythm, normal S1 and S2, no S3 or S4, and no murmur noted  GI: soft, non-distended and non-tender  Musculoskeletal: no lower extremity pitting edema present  full range of motion noted  tone is normal          Signed Electronically by: Kiera Mixon MD    "

## 2025-05-20 LAB
T4 FREE SERPL-MCNC: 0.27 NG/DL (ref 0.9–1.7)
TSH SERPL DL<=0.005 MIU/L-ACNC: 43.3 UIU/ML (ref 0.3–4.2)

## 2025-05-20 RX ORDER — LEVOTHYROXINE SODIUM 112 UG/1
112 TABLET ORAL
Qty: 90 TABLET | Refills: 0 | Status: SHIPPED | OUTPATIENT
Start: 2025-05-20 | End: 2025-05-21

## 2025-05-21 ENCOUNTER — TELEPHONE (OUTPATIENT)
Dept: FAMILY MEDICINE | Facility: CLINIC | Age: 35
End: 2025-05-21
Payer: COMMERCIAL

## 2025-05-21 DIAGNOSIS — F41.1 GAD (GENERALIZED ANXIETY DISORDER): ICD-10-CM

## 2025-05-21 DIAGNOSIS — K59.03 CONSTIPATION DUE TO OPIOID THERAPY: ICD-10-CM

## 2025-05-21 DIAGNOSIS — F11.20 OPIOID USE DISORDER, SEVERE, DEPENDENCE (H): ICD-10-CM

## 2025-05-21 DIAGNOSIS — T40.2X5A CONSTIPATION DUE TO OPIOID THERAPY: ICD-10-CM

## 2025-05-21 DIAGNOSIS — R94.6 THYROID FUNCTION TEST ABNORMAL: ICD-10-CM

## 2025-05-21 DIAGNOSIS — F17.200 NICOTINE DEPENDENCE, UNCOMPLICATED, UNSPECIFIED NICOTINE PRODUCT TYPE: ICD-10-CM

## 2025-05-21 DIAGNOSIS — F43.10 PTSD (POST-TRAUMATIC STRESS DISORDER): ICD-10-CM

## 2025-05-21 RX ORDER — BUPRENORPHINE HYDROCHLORIDE AND NALOXONE HYDROCHLORIDE DIHYDRATE 8; 2 MG/1; MG/1
1 TABLET SUBLINGUAL 2 TIMES DAILY
Qty: 28 TABLET | Refills: 0 | Status: SHIPPED | OUTPATIENT
Start: 2025-05-21 | End: 2025-06-04

## 2025-05-21 RX ORDER — LEVOTHYROXINE SODIUM 112 UG/1
112 TABLET ORAL
Qty: 90 TABLET | Refills: 0 | Status: SHIPPED | OUTPATIENT
Start: 2025-05-21

## 2025-05-21 RX ORDER — POLYETHYLENE GLYCOL 3350 17 G/17G
1 POWDER, FOR SOLUTION ORAL DAILY
Qty: 578 G | Refills: 0 | Status: SHIPPED | OUTPATIENT
Start: 2025-05-21

## 2025-05-21 RX ORDER — AMOXICILLIN 250 MG
1 CAPSULE ORAL 2 TIMES DAILY PRN
Qty: 60 TABLET | Refills: 2 | Status: SHIPPED | OUTPATIENT
Start: 2025-05-21

## 2025-05-21 NOTE — TELEPHONE ENCOUNTER
Telephone Message     5/21/2025  5:57 PM    Call Initiated by Kiera Mixon MD    Patient: Juana Madrigal   Phone number-  [phone] (home)       -Discussed thyroid results, start synthroid, orders in for thyroid US and for ab testing for hashimoto's  -Missed sublocaded infusion x2, having w/d symptoms. 2 week course of suboxone sent until next sublocade infusion can be done    Kiera Mixon MD

## 2025-05-22 ENCOUNTER — TELEPHONE (OUTPATIENT)
Dept: BEHAVIORAL HEALTH | Facility: CLINIC | Age: 35
End: 2025-05-22
Payer: COMMERCIAL

## 2025-05-22 NOTE — TELEPHONE ENCOUNTER
"RN reviewed the following staff message:    [facility] Rn Pool; Deidre Núñez MD  Maged Estradaa \"No Showed\" for their sublocade appointment on 5/20 and 5/09.  Pt has been sent a no show letter for the appointment missed on 5/20  She has been rescheduled for 6/6.    Thank you,  [facility]  [facility] [place]  [phone]    Per chart review, patient received 2 week supply of Suboxone from [place]'s provider.     Kiera Mixon MD JK    5/21/25  6:19 PM  Note   Telephone Message      5/21/2025  5:57 PM     Call Initiated by Kiera Mixon MD     Patient: Juana Madrigal   Phone number-  [phone] (home)        -Discussed thyroid results, start synthroid, orders in for thyroid US and for ab testing for hashimoto's  -Missed sublocaded infusion x2, having w/d symptoms. 2 week course of suboxone sent until next sublocade infusion can be done     Kiera Mixon MD           Routing to provider for review.     Ana Cristina Bonilla RN on 5/22/2025 at 10:40 AM'  "

## 2025-05-28 ENCOUNTER — ANCILLARY PROCEDURE (OUTPATIENT)
Dept: ULTRASOUND IMAGING | Facility: CLINIC | Age: 35
End: 2025-05-28
Payer: COMMERCIAL

## 2025-05-28 ENCOUNTER — TELEPHONE (OUTPATIENT)
Dept: FAMILY MEDICINE | Facility: CLINIC | Age: 35
End: 2025-05-28

## 2025-05-28 ENCOUNTER — DOCUMENTATION ONLY (OUTPATIENT)
Dept: FAMILY MEDICINE | Facility: CLINIC | Age: 35
End: 2025-05-28

## 2025-05-28 ENCOUNTER — OFFICE VISIT (OUTPATIENT)
Dept: FAMILY MEDICINE | Facility: CLINIC | Age: 35
End: 2025-05-28
Payer: COMMERCIAL

## 2025-05-28 ENCOUNTER — LAB (OUTPATIENT)
Dept: LAB | Facility: CLINIC | Age: 35
End: 2025-05-28
Payer: COMMERCIAL

## 2025-05-28 VITALS
SYSTOLIC BLOOD PRESSURE: 106 MMHG | RESPIRATION RATE: 16 BRPM | HEIGHT: 71 IN | HEART RATE: 65 BPM | DIASTOLIC BLOOD PRESSURE: 71 MMHG | OXYGEN SATURATION: 98 % | BODY MASS INDEX: 21.74 KG/M2 | WEIGHT: 155.3 LBS | TEMPERATURE: 97.8 F

## 2025-05-28 DIAGNOSIS — F11.11 OPIOID USE DISORDER, MILD, IN EARLY REMISSION (H): ICD-10-CM

## 2025-05-28 DIAGNOSIS — M06.9: ICD-10-CM

## 2025-05-28 DIAGNOSIS — F43.10 PTSD (POST-TRAUMATIC STRESS DISORDER): ICD-10-CM

## 2025-05-28 DIAGNOSIS — R94.6 THYROID FUNCTION TEST ABNORMAL: ICD-10-CM

## 2025-05-28 DIAGNOSIS — F41.1 GAD (GENERALIZED ANXIETY DISORDER): ICD-10-CM

## 2025-05-28 DIAGNOSIS — Z02.89 ENCOUNTER FOR COMPLETION OF FORM WITH PATIENT: Primary | ICD-10-CM

## 2025-05-28 DIAGNOSIS — F11.20 OPIOID USE DISORDER, SEVERE, DEPENDENCE (H): ICD-10-CM

## 2025-05-28 DIAGNOSIS — F32.A DEPRESSION, UNSPECIFIED DEPRESSION TYPE: ICD-10-CM

## 2025-05-28 PROCEDURE — 36415 COLL VENOUS BLD VENIPUNCTURE: CPT

## 2025-05-28 NOTE — PATIENT INSTRUCTIONS
Patient Education   Here is the plan from today's visit    1. Encounter for completion of form with patient (Primary)  2. RA (generalized anxiety disorder)  3. Opioid use disorder, mild, in early remission (H)  4. Opioid use disorder, severe, dependence (H)  5. PTSD (post-traumatic stress disorder)  6. Rheumatoid arthritis involving left hip, unspecified whether rheumatoid factor present (H)  Please follow up with rheumatology and your treatment specialist for mental health and sobriety. Bring us the papers to show you are doing the work. I recommend coming back 8/20/25 for another form.       Please call or return to clinic if your symptoms don't go away.    Follow up plan  No follow-ups on file.    Thank you for coming to [facility] today.  Lab Testing:  **If you had lab testing today and your results are reassuring or normal they will be mailed to you or sent through indico within 7 days.   **If the lab tests need quick action we will call you with the results.  **If you are having labs done on a different day, please call [phone] to schedule at [facility] or [phone] for other [facility] Outpatient Lab locations. Labs do not offer walk-in appointments.  The phone number we will call with results is # [phone] (home) . If this is not the best number please call our clinic and change the number.  Medication Refills:  If you need any refills please call your pharmacy and they will contact us.   If you need to  your refill at a new pharmacy, please contact the new pharmacy directly. The new pharmacy will help you get your medications transferred faster.   Scheduling:  If you have any concerns about today's visit or wish to schedule another appointment please call our office during normal business hours [phone] (8-5:00 M-F). If you can no longer make a scheduled visit, please cancel via indico or call us to cancel.   If a referral was made to an [facility] specialty  provider and you do not get a call from central scheduling, please refer to directions on your visit summary or call our office during normal business hours for assistance.   If a Mammogram was ordered for you at the Breast Center call [phone] to schedule or change your appointment.  If you had an XRay/CT/Ultrasound/MRI ordered the number is [phone] to schedule or change your radiology appointment.   [facility] has limited ultrasound appointments available on Wednesdays, if you would like your ultrasound at [facility], please call [phone] to schedule.   Medical Concerns:  If you have urgent medical concerns please call [phone] at any time of the day.    Ced Umana MD

## 2025-05-28 NOTE — PROGRESS NOTES
Preceptor Attestation:   Patient seen, evaluated and discussed with the resident. I have verified the content of the note, which accurately reflects my assessment of the patient and the plan of care.   Supervising Physician:  Gilmer Hadley MD

## 2025-05-28 NOTE — PROGRESS NOTES
"  {PROVIDER CHARTING PREFERENCE:118795}    Subjective   Juana is a 35 year old, presenting for the following health issues:  Forms (prozac) and Recheck Medication      5/28/2025    10:50 AM   Additional Questions   Roomed by Ohn   Accompanied by Self         5/28/2025   Forms   Any forms needing to be completed Yes         5/28/2025    Information    services provided? No     HPI      Juana has a hsitory of OUD on suboxone (waiting on next sublocade), PTSD, depression and RA.   She is currently in treatment with [facility] which started on April 14th  No definite date, maybe 6 months. It's for sobriety treatment. She is also following up with mental health provider for depression. Request a form for medical opinion that she is unable to work  For depression, takes Sertraline 50 mg daily. No passive SI. No thoghts of hurting self or others or hallucinations. No current substance use.   OUD started 01/2025. After that, depression, ptsd and anxiety seemed to get worse. Drug of coice was fentanyl.     Review of Systems  Constitutional, HEENT, cardiovascular, pulmonary, gi and gu systems are negative, except as otherwise noted.      Objective    /71   Pulse 65   Temp 97.8  F (36.6  C) (Temporal)   Resp 16   Ht 1.797 m (5' 10.75\")   Wt 70.4 kg (155 lb 4.8 oz)   LMP 04/22/2025 (Within Weeks)   SpO2 98%   BMI 21.81 kg/m    Body mass index is 21.81 kg/m .  Physical Exam   {Exam List (Optional):086710}    {Diagnostic Test Results (Optional):666042}        Signed Electronically by: Ced Umana MD  {Email feedback regarding this note to [email or web address]   :302458}  " "or others or hallucinations. No current substance use.   OUD started 01/2025. After that, depression, ptsd and anxiety seemed to get worse. Drug of coice was fentanyl.     Review of Systems  Constitutional, HEENT, cardiovascular, pulmonary, gi and gu systems are negative, except as otherwise noted.      Objective    /71   Pulse 65   Temp 97.8  F (36.6  C) (Temporal)   Resp 16   Ht 1.797 m (5' 10.75\")   Wt 70.4 kg (155 lb 4.8 oz)   LMP 04/22/2025 (Within Weeks)   SpO2 98%   BMI 21.81 kg/m    Body mass index is 21.81 kg/m .  Physical Exam   GENERAL: alert, well appearing and mild distress  EYES: Eyes grossly normal to inspection, conjunctivae and sclerae normal  HENT: NCAT, MMM  RESP: normal effort   CV: warm well perfused extremities  MS: no gross musculoskeletal defects noted  SKIN: no obvious suspicious lesions or rashes  NEURO: grossly normal strength and tone, mentation intact and speech normal  PSYCH: good eye contact, well dressed, cooperative, pleasant mentation, speech normal, affect normal to anxious, normal thought content and motor output, intact judgement and insight, not tracking to internal stimuli      Results for orders placed or performed in visit on 05/28/25   Thyroid peroxidase antibody     Status: Abnormal   Result Value Ref Range    Thyroid Peroxidase Antibody 2,333 (H) <35 IU/mL   Results for orders placed or performed in visit on 05/28/25   US Thyroid     Status: None    Narrative    US THYROID 5/28/2025 10:30 AM    COMPARISON: None    HISTORY: abnormal TSH/T4, thyroidomegaly on exam; Thyroid function  test abnormal    FINDINGS:   Thyroid parenchyma: heterogenous  The right lobe of the thyroid measures: 5.7 x 2.0 x 1.8 cm  The left lobe of the thyroid measures: 4.6 x 2.1 x 2.0 cm  The thyroid isthmus measures: 0.4 cm    Right Lobe:  Nodule 1:  Location: Mid  Size: 1.0 x 0.9 x 0.6 cm  Composition: Solid or almost completely solid (2 points)  Echogenicity: Hypoechoic (2 " points)  Shape: Wider than tall (0 points)  Margin: Smooth (0 points)  Echogenic Foci: None or large comet tail artifact (0 points)  Stability: Not previously imaged  TIRADS: TR4 (4-6 points)     Left Lobe:    Nodule 2:  Location: Mid  Size: 0.6 x 0.6 x 0.2 cm  Composition: Solid or almost completely solid (2 points)  Echogenicity: Hypoechoic (2 points)  Shape: Wider than tall (0 points)  Margin: Smooth (0 points)  Echogenic Foci: None or large comet tail artifact (0 points)  Stability: Not previously imaged  TIRADS: TR4 (4-6 points)       Impression    Impression:  Enlarged heterogeneous hypervascular thyroid as can be seen with  thyroiditis. Small TR 4 nodules as above. Consider follow-up according  to recommendations below.    ACR TI-RADS recommendations  TR2 (2 points) & TR1 (0 points) -No FNA or follow-up  TR3 (3 points) - FNA if ? 2.5cm, follow-up if 1.5 -2.4 cm in 1, 3 and  5 years  TR4 (4-6 points) - FNA if ? 1.5cm, follow-up if 1 -1.4 cm in 1, 2, 3  and 5 years  TR5 (?7 points) - FNA if ? 1cm, follow-up if 0.5 -0.9 cm every year  for 5 years     DARIUSZ BHATIA DO         SYSTEM ID:  [id number]           Signed Electronically by: Ced Umana MD

## 2025-05-28 NOTE — PROGRESS NOTES
Form has been completed by provider.     Form sent out via: Fax to [facility] at Fax Number: [phone]  Patient informed: Yes  Output date: May 28, 2025    Lucio Yañez MA      **Please close the encounter**

## 2025-05-29 LAB — THYROPEROXIDASE AB SERPL-ACNC: 2333 IU/ML

## 2025-05-29 NOTE — TELEPHONE ENCOUNTER
Please contact pt to let her know she should be seen in [facility] before her scheduled injection on 6/6/25.  I placed a note on her infusion appointment regarding this.

## 2025-05-29 NOTE — TELEPHONE ENCOUNTER
RN called patient and she agreed to present to the [facility] clinic at 0900 as a walk-in and then go up to get her Sublocade injection afterward.    Ana Cristina Bonilla RN on 5/29/2025 at 4:39 PM

## 2025-06-02 PROBLEM — E06.3 HASHIMOTO'S THYROIDITIS: Status: ACTIVE | Noted: 2025-06-02

## 2025-06-16 ENCOUNTER — OFFICE VISIT (OUTPATIENT)
Dept: FAMILY MEDICINE | Facility: CLINIC | Age: 35
End: 2025-06-16
Payer: COMMERCIAL

## 2025-06-16 VITALS
HEIGHT: 71 IN | BODY MASS INDEX: 21.91 KG/M2 | WEIGHT: 156.5 LBS | DIASTOLIC BLOOD PRESSURE: 58 MMHG | HEART RATE: 66 BPM | OXYGEN SATURATION: 98 % | RESPIRATION RATE: 16 BRPM | TEMPERATURE: 97.8 F | SYSTOLIC BLOOD PRESSURE: 97 MMHG

## 2025-06-16 DIAGNOSIS — F43.10 PTSD (POST-TRAUMATIC STRESS DISORDER): Primary | ICD-10-CM

## 2025-06-16 DIAGNOSIS — R87.610 ATYPICAL SQUAMOUS CELLS OF UNDETERMINED SIGNIFICANCE ON CYTOLOGIC SMEAR OF CERVIX (ASC-US): ICD-10-CM

## 2025-06-16 DIAGNOSIS — Z87.898 HISTORY OF SEXUAL VIOLENCE: ICD-10-CM

## 2025-06-16 DIAGNOSIS — E06.3 HASHIMOTO'S THYROIDITIS: ICD-10-CM

## 2025-06-16 PROCEDURE — 3078F DIAST BP <80 MM HG: CPT

## 2025-06-16 PROCEDURE — 99214 OFFICE O/P EST MOD 30 MIN: CPT | Mod: GC

## 2025-06-16 PROCEDURE — 3074F SYST BP LT 130 MM HG: CPT

## 2025-06-16 ASSESSMENT — ASTHMA QUESTIONNAIRES
QUESTION_5 LAST FOUR WEEKS HOW WOULD YOU RATE YOUR ASTHMA CONTROL: SOMEWHAT CONTROLLED
QUESTION_2 LAST FOUR WEEKS HOW OFTEN HAVE YOU HAD SHORTNESS OF BREATH: THREE TO SIX TIMES A WEEK
ACT_TOTALSCORE: 14
ACT_TOTALSCORE: 14
QUESTION_3 LAST FOUR WEEKS HOW OFTEN DID YOUR ASTHMA SYMPTOMS (WHEEZING, COUGHING, SHORTNESS OF BREATH, CHEST TIGHTNESS OR PAIN) WAKE YOU UP AT NIGHT OR EARLIER THAN USUAL IN THE MORNING: TWO OR THREE NIGHTS A WEEK
QUESTION_4 LAST FOUR WEEKS HOW OFTEN HAVE YOU USED YOUR RESCUE INHALER OR NEBULIZER MEDICATION (SUCH AS ALBUTEROL): TWO OR THREE TIMES PER WEEK
QUESTION_1 LAST FOUR WEEKS HOW MUCH OF THE TIME DID YOUR ASTHMA KEEP YOU FROM GETTING AS MUCH DONE AT WORK, SCHOOL OR AT HOME: SOME OF THE TIME

## 2025-06-16 NOTE — PATIENT INSTRUCTIONS
Patient Education   Here is the plan from today's visit    1. Atypical squamous cells of undetermined significance on cytologic smear of cervix (ASC-US)  You were seen in clinic today to discuss the colposcopy procedure and review your past cervical cancer screening history. You were also seen by our behavioral health team and GYN coordinator who provided you with additional information/resources.   - Colposcopy/Gynecology Clinic - [facility] Internal Referral        Please call or return to clinic if your symptoms don't go away.    Follow up plan  Return in about 1 week (around 6/23/2025).    Thank you for coming to [facility] today.  Lab Testing:  **If you had lab testing today and your results are reassuring or normal they will be mailed to you or sent through TouchPal within 7 days.   **If the lab tests need quick action we will call you with the results.  **If you are having labs done on a different day, please call [phone] to schedule at [facility] or [phone] for other [facility] Outpatient Lab locations. Labs do not offer walk-in appointments.  The phone number we will call with results is # [phone] (home) . If this is not the best number please call our clinic and change the number.  Medication Refills:  If you need any refills please call your pharmacy and they will contact us.   If you need to  your refill at a new pharmacy, please contact the new pharmacy directly. The new pharmacy will help you get your medications transferred faster.   Scheduling:  If you have any concerns about today's visit or wish to schedule another appointment please call our office during normal business hours [phone] (8-5:00 M-F). If you can no longer make a scheduled visit, please cancel via TouchPal or call us to cancel.   If a referral was made to an [facility] specialty provider and you do not get a call from central scheduling, please refer to directions on your visit summary or call  our office during normal business hours for assistance.   If a Mammogram was ordered for you at the Breast Center call [phone] to schedule or change your appointment.  If you had an XRay/CT/Ultrasound/MRI ordered the number is [phone] to schedule or change your radiology appointment.   [facility] has limited ultrasound appointments available on Wednesdays, if you would like your ultrasound at [facility], please call [phone] to schedule.   Medical Concerns:  If you have urgent medical concerns please call [phone] at any time of the day.    Amanda Madrid MD    Sexual Violence Resources  [facility] [address] [facility] [address] [phone] Call for specific information, meeting times and locations. To attend any of these groups, we require an initial intake interview.   [facility] [address] [place] [address] [email or web address] 24 hour crisis line: [phone] Offers support groups- both closed and drop in groups.   [facility] Sexual Violence Services [place], [place], call for location [email or web address].[email or web address]./[facility]-medical/clinics-services/sos-sexual-violence-services Crisis line: [phone]- offers short term counseling.   [facility] [address] [place] [address] [phone] [email or web address]   [address] [place] [address], [address], [place] [address] https://[address] [place] [address].org/prevention/sexual-assault-services/ Phone: [phone] Can fill out support group interest form online   [facility] [address] [facility] [facility]: [place] [address] [email or web address]   Sexual Violence Support Group Thurs., 5-6 PM To register, please call: [phone]

## 2025-06-16 NOTE — PROGRESS NOTES
Preceptor Attestation:   I discussed the patient with the resident. I have verified the content of the note, which accurately reflects my assessment of the patient and the plan of care.   Supervising Physician:  Maria Elena Lugo MD.
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
## 2025-08-06 ENCOUNTER — TELEPHONE (OUTPATIENT)
Dept: FAMILY MEDICINE | Facility: CLINIC | Age: 35
End: 2025-08-06
Payer: COMMERCIAL

## 2025-08-19 ENCOUNTER — TELEPHONE (OUTPATIENT)
Dept: FAMILY MEDICINE | Facility: CLINIC | Age: 35
End: 2025-08-19
Payer: COMMERCIAL

## 2025-08-25 ENCOUNTER — OFFICE VISIT (OUTPATIENT)
Dept: FAMILY MEDICINE | Facility: CLINIC | Age: 35
End: 2025-08-25
Payer: MEDICAID

## 2025-08-25 VITALS
SYSTOLIC BLOOD PRESSURE: 98 MMHG | TEMPERATURE: 98 F | RESPIRATION RATE: 18 BRPM | OXYGEN SATURATION: 99 % | DIASTOLIC BLOOD PRESSURE: 65 MMHG | HEART RATE: 72 BPM

## 2025-08-25 DIAGNOSIS — Z71.6 ENCOUNTER FOR SMOKING CESSATION COUNSELING: ICD-10-CM

## 2025-08-25 DIAGNOSIS — M06.9: Primary | ICD-10-CM

## 2025-08-25 DIAGNOSIS — R35.0 URINARY FREQUENCY: ICD-10-CM

## 2025-08-25 DIAGNOSIS — F32.A DEPRESSION, UNSPECIFIED DEPRESSION TYPE: ICD-10-CM

## 2025-08-25 DIAGNOSIS — R94.6 THYROID FUNCTION TEST ABNORMAL: ICD-10-CM

## 2025-08-25 RX ORDER — SERTRALINE HYDROCHLORIDE 25 MG/1
25 TABLET, FILM COATED ORAL DAILY
Qty: 14 TABLET | Refills: 0 | Status: SHIPPED | OUTPATIENT
Start: 2025-08-25

## 2025-08-25 RX ORDER — LEVOTHYROXINE SODIUM 112 UG/1
112 TABLET ORAL
Qty: 90 TABLET | Refills: 0 | Status: SHIPPED | OUTPATIENT
Start: 2025-08-25

## 2025-08-25 RX ORDER — PREDNISONE 10 MG/1
15 TABLET ORAL DAILY
Qty: 45 TABLET | Refills: 1 | Status: SHIPPED | OUTPATIENT
Start: 2025-08-25

## 2025-08-25 RX ORDER — FOLIC ACID 1 MG/1
1 TABLET ORAL DAILY
Qty: 90 TABLET | Refills: 3 | Status: SHIPPED | OUTPATIENT
Start: 2025-08-25

## 2025-08-25 RX ORDER — POLYETHYLENE GLYCOL 3350 17 G/17G
1 POWDER, FOR SOLUTION ORAL DAILY
Qty: 578 G | Refills: 0 | Status: CANCELLED | OUTPATIENT
Start: 2025-08-25

## 2025-08-25 RX ORDER — BUPROPION HYDROCHLORIDE 150 MG/1
150 TABLET ORAL EVERY MORNING
Qty: 30 TABLET | Refills: 3 | Status: SHIPPED | OUTPATIENT
Start: 2025-08-25

## 2025-08-25 ASSESSMENT — ASTHMA QUESTIONNAIRES
QUESTION_4 LAST FOUR WEEKS HOW OFTEN HAVE YOU USED YOUR RESCUE INHALER OR NEBULIZER MEDICATION (SUCH AS ALBUTEROL): TWO OR THREE TIMES PER WEEK
QUESTION_1 LAST FOUR WEEKS HOW MUCH OF THE TIME DID YOUR ASTHMA KEEP YOU FROM GETTING AS MUCH DONE AT WORK, SCHOOL OR AT HOME: SOME OF THE TIME
QUESTION_5 LAST FOUR WEEKS HOW WOULD YOU RATE YOUR ASTHMA CONTROL: SOMEWHAT CONTROLLED
QUESTION_3 LAST FOUR WEEKS HOW OFTEN DID YOUR ASTHMA SYMPTOMS (WHEEZING, COUGHING, SHORTNESS OF BREATH, CHEST TIGHTNESS OR PAIN) WAKE YOU UP AT NIGHT OR EARLIER THAN USUAL IN THE MORNING: ONCE OR TWICE
EMERGENCY_ROOM_LAST_YEAR_TOTAL: TWO
QUESTION_2 LAST FOUR WEEKS HOW OFTEN HAVE YOU HAD SHORTNESS OF BREATH: ONCE OR TWICE A WEEK
ACT_TOTALSCORE: 17

## 2025-08-25 ASSESSMENT — PATIENT HEALTH QUESTIONNAIRE - PHQ9
10. IF YOU CHECKED OFF ANY PROBLEMS, HOW DIFFICULT HAVE THESE PROBLEMS MADE IT FOR YOU TO DO YOUR WORK, TAKE CARE OF THINGS AT HOME, OR GET ALONG WITH OTHER PEOPLE: EXTREMELY DIFFICULT
SUM OF ALL RESPONSES TO PHQ QUESTIONS 1-9: 22
SUM OF ALL RESPONSES TO PHQ QUESTIONS 1-9: 22

## 2025-08-26 ENCOUNTER — PATIENT OUTREACH (OUTPATIENT)
Dept: CARE COORDINATION | Facility: CLINIC | Age: 35
End: 2025-08-26
Payer: MEDICAID

## 2025-08-26 LAB
ALBUMIN UR-MCNC: NEGATIVE MG/DL
AMORPH CRY #/AREA URNS HPF: ABNORMAL /HPF
APPEARANCE UR: ABNORMAL
BACTERIA #/AREA URNS HPF: ABNORMAL /HPF
BILIRUB UR QL STRIP: NEGATIVE
COLOR UR AUTO: YELLOW
GLUCOSE UR STRIP-MCNC: NEGATIVE MG/DL
HGB UR QL STRIP: NEGATIVE
KETONES UR STRIP-MCNC: NEGATIVE MG/DL
LEUKOCYTE ESTERASE UR QL STRIP: NEGATIVE
NITRATE UR QL: NEGATIVE
PH UR STRIP: 6 [PH] (ref 5–8)
RBC #/AREA URNS AUTO: ABNORMAL /HPF
SP GR UR STRIP: 1.02 (ref 1–1.03)
SQUAMOUS #/AREA URNS AUTO: ABNORMAL /LPF
UROBILINOGEN UR STRIP-ACNC: 0.2 E.U./DL
WBC #/AREA URNS AUTO: ABNORMAL /HPF

## 2025-08-28 ENCOUNTER — PATIENT OUTREACH (OUTPATIENT)
Dept: CARE COORDINATION | Facility: CLINIC | Age: 35
End: 2025-08-28
Payer: MEDICAID